# Patient Record
Sex: FEMALE | Race: WHITE | HISPANIC OR LATINO | Employment: FULL TIME | URBAN - METROPOLITAN AREA
[De-identification: names, ages, dates, MRNs, and addresses within clinical notes are randomized per-mention and may not be internally consistent; named-entity substitution may affect disease eponyms.]

---

## 2017-01-23 ENCOUNTER — ALLSCRIPTS OFFICE VISIT (OUTPATIENT)
Dept: OTHER | Facility: OTHER | Age: 37
End: 2017-01-23

## 2017-02-13 ENCOUNTER — GENERIC CONVERSION - ENCOUNTER (OUTPATIENT)
Dept: OTHER | Facility: OTHER | Age: 37
End: 2017-02-13

## 2017-02-20 ENCOUNTER — ALLSCRIPTS OFFICE VISIT (OUTPATIENT)
Dept: OTHER | Facility: OTHER | Age: 37
End: 2017-02-20

## 2017-02-20 DIAGNOSIS — H46.9 OPTIC NEURITIS: ICD-10-CM

## 2017-02-20 DIAGNOSIS — G25.81 RESTLESS LEGS SYNDROME: ICD-10-CM

## 2017-02-21 ENCOUNTER — TRANSCRIBE ORDERS (OUTPATIENT)
Dept: MRI IMAGING | Facility: CLINIC | Age: 37
End: 2017-02-21

## 2017-02-21 ENCOUNTER — APPOINTMENT (OUTPATIENT)
Dept: LAB | Facility: CLINIC | Age: 37
End: 2017-02-21
Payer: COMMERCIAL

## 2017-02-21 ENCOUNTER — TRANSCRIBE ORDERS (OUTPATIENT)
Dept: ADMINISTRATIVE | Facility: HOSPITAL | Age: 37
End: 2017-02-21

## 2017-02-21 DIAGNOSIS — H46.9 OPTIC NEURITIS: ICD-10-CM

## 2017-02-21 DIAGNOSIS — G25.81 RESTLESS LEGS SYNDROME: ICD-10-CM

## 2017-02-21 DIAGNOSIS — H46.9 OPTIC NEURITIS, UNSPECIFIED: Primary | ICD-10-CM

## 2017-02-21 LAB
BUN SERPL-MCNC: 15 MG/DL (ref 5–25)
CREAT SERPL-MCNC: 0.64 MG/DL (ref 0.6–1.3)
ERYTHROCYTE [SEDIMENTATION RATE] IN BLOOD: 7 MM/HOUR (ref 0–20)
FERRITIN SERPL-MCNC: 88 NG/ML (ref 8–388)
GFR SERPL CREATININE-BSD FRML MDRD: >60 ML/MIN/1.73SQ M

## 2017-02-21 PROCEDURE — 82565 ASSAY OF CREATININE: CPT

## 2017-02-21 PROCEDURE — 86617 LYME DISEASE ANTIBODY: CPT

## 2017-02-21 PROCEDURE — 84520 ASSAY OF UREA NITROGEN: CPT

## 2017-02-21 PROCEDURE — 36415 COLL VENOUS BLD VENIPUNCTURE: CPT

## 2017-02-21 PROCEDURE — 82728 ASSAY OF FERRITIN: CPT

## 2017-02-21 PROCEDURE — 85652 RBC SED RATE AUTOMATED: CPT

## 2017-02-22 LAB

## 2017-03-13 ENCOUNTER — HOSPITAL ENCOUNTER (OUTPATIENT)
Dept: MRI IMAGING | Facility: CLINIC | Age: 37
Discharge: HOME | End: 2017-03-13
Payer: COMMERCIAL

## 2017-03-13 DIAGNOSIS — H46.9 OPTIC NEURITIS: ICD-10-CM

## 2017-03-13 PROCEDURE — 70543 MRI ORBT/FAC/NCK W/O &W/DYE: CPT

## 2017-03-13 PROCEDURE — A9585 GADOBUTROL INJECTION: HCPCS | Performed by: PSYCHIATRY & NEUROLOGY

## 2017-03-13 PROCEDURE — 70553 MRI BRAIN STEM W/O & W/DYE: CPT

## 2017-03-13 RX ADMIN — GADOBUTROL 6 ML: 604.72 INJECTION INTRAVENOUS at 08:32

## 2017-03-29 ENCOUNTER — TRANSCRIBE ORDERS (OUTPATIENT)
Dept: ADMINISTRATIVE | Facility: HOSPITAL | Age: 37
End: 2017-03-29

## 2017-03-29 DIAGNOSIS — H46.9 OPTIC NEURITIS, UNSPECIFIED: Primary | ICD-10-CM

## 2017-07-27 ENCOUNTER — TRANSCRIBE ORDERS (OUTPATIENT)
Dept: RADIOLOGY | Facility: CLINIC | Age: 37
End: 2017-07-27

## 2017-07-27 ENCOUNTER — APPOINTMENT (OUTPATIENT)
Dept: LAB | Facility: CLINIC | Age: 37
End: 2017-07-27
Payer: COMMERCIAL

## 2017-07-27 DIAGNOSIS — Z00.8 HEALTH EXAMINATION IN POPULATION SURVEY: Primary | ICD-10-CM

## 2017-07-27 DIAGNOSIS — Z00.8 HEALTH EXAMINATION IN POPULATION SURVEY: ICD-10-CM

## 2017-07-27 LAB
CHOLEST SERPL-MCNC: 187 MG/DL (ref 50–200)
EST. AVERAGE GLUCOSE BLD GHB EST-MCNC: 111 MG/DL
HBA1C MFR BLD: 5.5 % (ref 4.2–6.3)
HDLC SERPL-MCNC: 38 MG/DL (ref 40–60)
LDLC SERPL CALC-MCNC: 95 MG/DL (ref 0–100)
TRIGL SERPL-MCNC: 271 MG/DL

## 2017-07-27 PROCEDURE — 83036 HEMOGLOBIN GLYCOSYLATED A1C: CPT

## 2017-07-27 PROCEDURE — 80061 LIPID PANEL: CPT

## 2017-07-27 PROCEDURE — 36415 COLL VENOUS BLD VENIPUNCTURE: CPT

## 2017-11-14 ENCOUNTER — ALLSCRIPTS OFFICE VISIT (OUTPATIENT)
Dept: OTHER | Facility: OTHER | Age: 37
End: 2017-11-14

## 2017-11-14 ENCOUNTER — LAB REQUISITION (OUTPATIENT)
Dept: LAB | Facility: HOSPITAL | Age: 37
End: 2017-11-14
Payer: COMMERCIAL

## 2017-11-14 ENCOUNTER — GENERIC CONVERSION - ENCOUNTER (OUTPATIENT)
Dept: OTHER | Facility: OTHER | Age: 37
End: 2017-11-14

## 2017-11-14 DIAGNOSIS — Z11.51 ENCOUNTER FOR SCREENING FOR HUMAN PAPILLOMAVIRUS (HPV): ICD-10-CM

## 2017-11-14 LAB — S PYO AG THROAT QL: NEGATIVE

## 2017-11-14 PROCEDURE — 87624 HPV HI-RISK TYP POOLED RSLT: CPT | Performed by: PHYSICIAN ASSISTANT

## 2017-11-14 PROCEDURE — G0145 SCR C/V CYTO,THINLAYER,RESCR: HCPCS | Performed by: PHYSICIAN ASSISTANT

## 2017-11-15 NOTE — PROGRESS NOTES
Assessment    1  Pharyngitis (462) (J02 9)  2  Viral infection (079 99) (B34 9)    Discussion/Summary    Viral illness-Rest  Drink plenty of fluids  Tylenol or Motrin or Advil for fever  Avoid dairy for a few days  testing is negative  The patient was counseled regarding  Possible side effects of new medications were reviewed with the patient/guardian today  The treatment plan was reviewed with the patient/guardian  The patient/guardian understands and agrees with the treatment plan     Self Referrals: No      Chief Complaint  Patient is here today with complaints of body aches, fever, and abdominal pains      History of Present Illness  Pt developed sinus congestion last night  She has had sore throat for the past few days  She woke up today with nausea, chills and body achiness  She took tylenol  She currently has a fever  She has a pain across the top of her abdomen  her daughter recent had a stomach virus      Review of Systems   Constitutional: fever,-- feeling poorly-- and-- chills, but-- as noted in HPI  Eyes: No complaints of eye pain, no red eyes, no eyesight problems, no discharge, no dry eyes, no itching of eyes  ENT: sore throat-- and-- sinus drainage and recent sore throat, but-- as noted in HPI  Cardiovascular: No complaints of slow heart rate, no fast heart rate, no chest pain, no palpitations, no leg claudication, no lower extremity edema  Respiratory: No complaints of shortness of breath, no wheezing, no cough, no SOB on exertion, no orthopnea, no PND  Gastrointestinal: nausea-- and-- vomiting, but-- as noted in HPI  Musculoskeletal: myalgias, but-- as noted in HPI  Active Problems  1  Anxiety disorder (300 00) (F41 9)  2  Benign essential hypertension (401 1) (I10)  3  Depressive disorder (311) (F32 9)  4  Encounter for gynecological examination without abnormal finding (V72 31) (Z01 419)  5  Hypertriglyceridemia (272 1) (E78 1)  6  Insomnia (780 52) (G47 00)  7   Ocular migraine (346 80) (G43 109)  8  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)  9  Optic neuritis (377 30) (H46 9)  10  Panic attack (300 01) (F41 0)  11  Positive depression screening (796 4) (Z13 89)  12  Restless leg syndrome (333 94) (G25 81)  13  Retinal hole (362 54) (H33 329)  14  Screening for human papillomavirus (HPV) (V73 81) (Z11 51)  15  Seasonal allergies (477 9) (J30 2)  16  Sinusitis (473 9) (J32 9)  17  Tension headache (307 81) (G44 209)  18  Vitamin D deficiency (268 9) (E55 9)    Past Medical History  1  Acute upper respiratory infection (465 9) (J06 9)    The active problems and past medical history were reviewed and updated today  Surgical History  1  History of Closed Laryngeal Fracture Repair With Manipulative Reduction  2  History of Eye Surgery  3  Denied: History of Recent Surgery    The surgical history was reviewed and updated today  Family History  Mother   1  Family history of hypertension (V17 49) (Z82 49)  2  Family history of thyroid disease (V18 19) (Z83 49)  Father   3  Family history of diabetes mellitus (V18 0) (Z83 3)  4  Family history of hypertension (V17 49) (Z82 49)  Maternal Grandmother   5  Family history of diabetes mellitus (V18 0) (Z83 3)  Family History   6  Family history of diabetes mellitus (V18 0) (Z83 3)  7  Family history of hypertension (V17 49) (Z82 49)    The family history was reviewed and updated today  Social History     · Employed   · No illicit drug use   · Sexually active   · Single   · Social alcohol use (Z78 9)  The social history was reviewed and updated today  The social history was reviewed and is unchanged  Current Meds  1  ALPRAZolam 0 5 MG Oral Tablet; TAKE 1 TABLET DAILY AS NEEDED; Therapy: [id number] to (Evaluate:02Nov2017); Last Rx:[id number] Ordered  2  [facility] Vitamin E CAPS; take 1 capsule daily; Therapy: (Recorded:01Jun2017) to Recorded  3   HydroCHLOROthiazide 25 MG Oral Tablet; TAKE 1 TABLET EVERY MORNING  Requested for: 20XNZ9480; Last Rx: [id number] Ordered  4  Loratadine 10 MG Oral Tablet; TAKE 1 TABLET DAILY; Therapy: (Recorded:01Jun2017) to Recorded  5  Tri-Linyah 0 18/0 215/0 25 MG-35 MCG Oral Tablet; Take 1 tablet daily as directed; Therapy: [id number] to (Evaluate:16Oct2018)  Requested for: 26WNA0371; Last Rx:14Nov2017 Ordered  6  Vitamin D 1000 UNIT CAPS; TAKE 2 CAPSULE Daily; Therapy: (Recorded:01Jun2017) to Recorded  7  Zolpidem Tartrate ER 6 25 MG Oral Tablet Extended Release; TAKE 1 TABLET AT BEDTIME AS NEEDED FOR SLEEP; Therapy: [id number] to (Evaluate:20Apr2017); Last Rx:21Mar2017 Ordered    The medication list was reviewed and updated today  Allergies  1  Amoxicillin CAPS    Vitals  Vital Signs    Recorded: 57QRT8186 02:02PM   Temperature 100 8 F   Heart Rate 98   Systolic 069   Diastolic 84   Height 5 ft    Weight 149 lb 8 0 oz   BMI Calculated 29 2   BSA Calculated 1 64   O2 Saturation 96       Physical Exam   Constitutional  General appearance: Abnormal   uncomfortable-- and-- appears tired  Eyes  Conjunctiva and lids: No swelling, erythema or discharge  Pupils and irises: Equal, round and reactive to light  Ears, Nose, Mouth, and Throat  External inspection of ears and nose: Normal    Otoscopic examination: Tympanic membranes translucent with normal light reflex  Canals patent without erythema  Nasal mucosa, septum, and turbinates: Normal without edema or erythema  Oropharynx: Abnormal  -- left tonsils, + mild exudate  Pulmonary  Respiratory effort: No increased work of breathing or signs of respiratory distress  Auscultation of lungs: Clear to auscultation  Cardiovascular  Auscultation of heart: Normal rate and rhythm, normal S1 and S2, without murmurs  Abdomen  Abdomen: Non-tender, no masses  Lymphatic  Palpation of lymph nodes in neck: No lymphadenopathy  Musculoskeletal  Gait and station: Normal    Skin  Skin and subcutaneous tissue: Normal without rashes or lesions     Psychiatric  Orientation to person, place, and time: Normal    Mood and affect: Normal          Results/Data  PHQ-9 Adult Depression Screening [id number] 02:18PM User, Joes     Test Name Result Flag Reference   PHQ-9 Adult Depression Score 4       Over the last two weeks, how often have you been bothered by any of the following problems? Little interest or pleasure in doing things: Not at all - 0 Feeling down, depressed, or hopeless: Not at all - 0 Trouble falling or staying asleep, or sleeping too much: Several days - 1 Feeling tired or having little energy: Several days - 1 Poor appetite or over eating: Not at all - 0 Feeling bad about yourself - or that you are a failure or have let yourself or your family down: Several days - 1 Trouble concentrating on things, such as reading the newspaper or watching television: Several days - 1 Moving or speaking so slowly that other people could have noticed  Or the opposite -  being so fidgety or restless that you have been moving around a lot more than usual: Not at all - 0 Thoughts that you would be better off dead, or of hurting yourself in some way: Not at all - 0   PHQ-9 Adult Depression Screening Negative     PHQ-9 Difficulty Level Not difficult at all     PHQ-9 Severity Minimal Depression           Message  Kari Ly is under my professional care  She was seen in my office on 11-14-17   She is able to return to work on  11-              1 Amended By: Brooke Mccurdy;  Nov 15 2017 1:48 PM EST    Signatures   Electronically signed by : Arturo Albarado NP; Nov 14 2017  2:28PM EST                       (Author)    Electronically signed by : Guy Rashid MD; Nov 14 2017  2:32PM EST                       (Co-author)    Electronically signed by : Guy Rashid MD; Nov 15 2017  4:12PM EST                       (Co-author)

## 2017-11-16 LAB — HPV RRNA GENITAL QL NAA+PROBE: NORMAL

## 2017-11-20 LAB
LAB AP GYN PRIMARY INTERPRETATION: NORMAL
LAB AP LMP: NORMAL
Lab: NORMAL

## 2018-01-10 NOTE — MISCELLANEOUS
Message  Return to work or school:   Mirian Flor is under my professional care   She was seen in my office on 11-14-17   She is able to return to work on  11-17-17            Signatures  Electronically signed by : Justin Meyers NP; Nov 14 2017  2:19PM EST                       (Author)  Electronically signed by : Justin Meyers NP; Nov 14 2017  2:28PM EST                       (Author)  Electronically signed by : Erik Gruber MD; Nov 14 2017  2:32PM EST

## 2018-01-10 NOTE — RESULT NOTES
Verified Results  (1) COMPREHENSIVE METABOLIC PANEL [id number] 69:25JM [facility] Kidney Disease Education Program recommendations are as follows:  GFR calculation is accurate only with a steady state creatinine  Chronic Kidney disease less than 60 ml/min/1 73 sq  meters  Kidney failure less than 15 ml/min/1 73 sq  meters  Test Name Result Flag Reference   GLUCOSE,RANDM 84 mg/dL     If the patient is fasting, the ADA then defines impaired fasting glucose as > 100 mg/dL and diabetes as > or equal to 123 mg/dL  SODIUM 138 mmol/L  136-145   POTASSIUM 3 7 mmol/L  3 5-5 3   CHLORIDE 103 mmol/L  100-108   CARBON DIOXIDE 26 mmol/L  21-32   ANION GAP (CALC) 9 mmol/L  4-13   BLOOD UREA NITROGEN 17 mg/dL  5-25   CREATININE 0 62 mg/dL  0 60-1 30   Standardized to IDMS reference method   CALCIUM 9 0 mg/dL  8 3-10 1   BILI, TOTAL 0 30 mg/dL  0 20-1 00   ALK PHOSPHATAS 56 U/L     ALT (SGPT) 17 U/L  12-78   AST(SGOT) 14 U/L  5-45   ALBUMIN 3 7 g/dL  3 5-5 0   TOTAL PROTEIN 8 2 g/dL  6 4-8 2   eGFR Non-African American      >60 0 ml/min/1 73sq m     (1) LIPID PANEL FASTING W DIRECT LDL REFLEX [id number] 09:25AM Wendi Mercedes   Triglyceride:         Normal              <150 mg/dl       Borderline High    150-199 mg/dl       High               200-499 mg/dl       Very High          >499 mg/dl  Cholesterol:         Desirable        <200 mg/dl      Borderline High  200-239 mg/dl      High             >239 mg/dl  HDL Cholesterol:        High    >59 mg/dL      Low     <41 mg/dL  LDL Cholesterol:        Optimal          <100 mg/dl         Near Optimal     100-129 mg/dl        Above Optimal          Borderline High   130-159 mg/dl          High              160-189 mg/dl          Very High        >189 mg/dl  LDL CALCULATED:    This screening LDL is a calculated result  It does not have the accuracy of the Direct Measured LDL in the monitoring of patients with hyperlipidemia and/or statin therapy     Direct Measure LDL ([id number]) must be ordered separately in these patients  Test Name Result Flag Reference   CHOLESTEROL 160 mg/dL     LDL CHOLESTEROL CALCULATED 73 mg/dL  0-100   TRIGLYCERIDES 243 mg/dL H <=150   HDL,DIRECT 38 mg/dL L 40-60     (1) TSH WITH FT4 REFLEX [id number] 09:25AM [facility]   Patients undergoing fluorescein dye angiography may retain small amounts of fluorescein in the body for 48-72 hours post procedure  Samples containing fluorescein can produce falsely depressed TSH values  If the patient had this procedure,a specimen should be resubmitted post fluorescein clearance  The recommended reference ranges for TSH during pregnancy are as follows:  First trimester 0 1 to 2 5 uIU/mL  Second trimester  0 2 to 3 0 uIU/mL  Third trimester 0 3 to 3 0 uIU/m     Test Name Result Flag Reference   TSH 1 800 uIU/mL  0 358-3 740     (1) VITAMIN D 25-HYDROXY [id number] 09:25AM [place], [address]     Test Name Result Flag Reference   VIT D 25-HYDROX 17 6 ng/mL L 30 0-100 0     (1) CBC/PLT/DIFF [id number] 09:25AM [facility]     Test Name Result Flag Reference   WBC COUNT 10 89 Thousand/uL H 4 31-10 16   RBC COUNT 4 74 Million/uL  3 81-5 12   HEMOGLOBIN 13 8 g/dL  11 5-15 4   HEMATOCRIT 40 3 %  34 8-46  1   MCV 85 fL  82-98   MCH 29 1 pg  26 8-34 3   MCHC 34 2 g/dL  31 4-37 4   RDW 12 2 %  11 6-15 1   MPV 11 8 fL  8 9-12 7   PLATELET COUNT 604 Thousands/uL  149-390   nRBC AUTOMATED 0 /100 WBCs     NEUTROPHILS RELATIVE PERCENT 71 %  43-75   LYMPHOCYTES RELATIVE PERCENT 22 %  14-44   MONOCYTES RELATIVE PERCENT 5 %  4-12   EOSINOPHILS RELATIVE PERCENT 2 %  0-6   BASOPHILS RELATIVE PERCENT 0 %  0-1   NEUTROPHILS ABSOLUTE COUNT 7 74 Thousands/µL H 1 85-7 62   LYMPHOCYTES ABSOLUTE COUNT 2 37 Thousands/µL  0 60-4 47   MONOCYTES ABSOLUTE COUNT 0 52 Thousand/µL  0 17-1 22   EOSINOPHILS ABSOLUTE COUNT 0 20 Thousand/µL  0 00-0 61   BASOPHILS ABSOLUTE COUNT 0 02 Thousands/µL  0 00-0 10

## 2018-01-12 NOTE — MISCELLANEOUS
Message   Recorded as Task   Date: 03/08/2016 04:01 PM, Created By: Reshma Feliciano   Task Name: Follow Up   Assigned To: Wendi Mercedes   Regarding Patient: Joseph Lofton, Status: Active   [facility] - 08 Mar 2016 4:01 PM     TASK CREATED  Caller: Self; General Medical Question; [phone] (Home)  HCTZ is working well for the BP, however with the warmer temps she is noticing a lot more sweating  Also with any alcohol consumption she gets real flushed  and cheeks are pink pretty much always if called befor 4:30 call [phone] afterwards call [phone]   Spoke to patient she is flushing and feeling " hot flashes"  Discussed not typical side effect from HCTZ  Will lower her dose to 12 5 mg  If symptoms fail to improve consider another agent   monitor BP closely and call in 2 wks      Signatures   Electronically signed by : Jazzy Hassan MD; Mar  8 2016  4:46PM EST                       (Author)

## 2018-01-13 VITALS
WEIGHT: 134 LBS | HEART RATE: 72 BPM | RESPIRATION RATE: 16 BRPM | SYSTOLIC BLOOD PRESSURE: 130 MMHG | BODY MASS INDEX: 26.31 KG/M2 | DIASTOLIC BLOOD PRESSURE: 88 MMHG | HEIGHT: 60 IN

## 2018-01-14 VITALS
OXYGEN SATURATION: 96 % | DIASTOLIC BLOOD PRESSURE: 84 MMHG | HEART RATE: 98 BPM | SYSTOLIC BLOOD PRESSURE: 128 MMHG | HEIGHT: 60 IN | BODY MASS INDEX: 29.35 KG/M2 | TEMPERATURE: 100.8 F | WEIGHT: 149.5 LBS

## 2018-01-14 VITALS
SYSTOLIC BLOOD PRESSURE: 124 MMHG | OXYGEN SATURATION: 98 % | BODY MASS INDEX: 26.55 KG/M2 | DIASTOLIC BLOOD PRESSURE: 80 MMHG | TEMPERATURE: 98.5 F | WEIGHT: 135.25 LBS | HEART RATE: 88 BPM | HEIGHT: 60 IN

## 2018-01-18 NOTE — MISCELLANEOUS
Message  dizzy since last night, feels off balance  slightly nauseated  Trazodone was recently stopped  She has not started temazepam yet b/c it was too expensive  Discussed w/ her it sounds like vertigo  would try OTC meclizine  If symptoms persist would speak w/ Neuro  ER precautions  She would like to try Ambien for sleep  Will RX  Discussed not intended for nightly use, potential for dependence        Signatures   Electronically signed by : Martina Mackenzie MD; Feb 12 2016  8:07AM EST                       (Author)

## 2018-01-22 VITALS
BODY MASS INDEX: 29.06 KG/M2 | HEIGHT: 60 IN | DIASTOLIC BLOOD PRESSURE: 70 MMHG | WEIGHT: 148 LBS | SYSTOLIC BLOOD PRESSURE: 122 MMHG

## 2018-02-28 DIAGNOSIS — F41.9 ANXIETY: Primary | ICD-10-CM

## 2018-02-28 DIAGNOSIS — I10 HYPERTENSION, UNSPECIFIED TYPE: ICD-10-CM

## 2018-02-28 RX ORDER — HYDROCHLOROTHIAZIDE 25 MG/1
1 TABLET ORAL
COMMUNITY
End: 2018-02-28 | Stop reason: SDUPTHER

## 2018-02-28 RX ORDER — HYDROCHLOROTHIAZIDE 25 MG/1
25 TABLET ORAL DAILY
Qty: 90 TABLET | Refills: 2 | Status: SHIPPED | OUTPATIENT
Start: 2018-02-28 | End: 2018-06-04 | Stop reason: SDUPTHER

## 2018-02-28 RX ORDER — ALPRAZOLAM 0.5 MG/1
1 TABLET ORAL DAILY PRN
COMMUNITY
Start: 2016-05-12 | End: 2018-02-28 | Stop reason: SDUPTHER

## 2018-02-28 RX ORDER — ALPRAZOLAM 0.5 MG/1
0.5 TABLET ORAL DAILY PRN
Qty: 90 TABLET | Refills: 0 | Status: SHIPPED | OUTPATIENT
Start: 2018-02-28 | End: 2018-06-04 | Stop reason: SDUPTHER

## 2018-06-04 DIAGNOSIS — F41.9 ANXIETY: ICD-10-CM

## 2018-06-04 DIAGNOSIS — I10 HYPERTENSION, UNSPECIFIED TYPE: ICD-10-CM

## 2018-06-04 RX ORDER — HYDROCHLOROTHIAZIDE 25 MG/1
25 TABLET ORAL DAILY
Qty: 90 TABLET | Refills: 0 | Status: SHIPPED | OUTPATIENT
Start: 2018-06-04 | End: 2018-09-26 | Stop reason: SDUPTHER

## 2018-06-04 RX ORDER — ALPRAZOLAM 0.5 MG/1
0.5 TABLET ORAL DAILY PRN
Qty: 90 TABLET | Refills: 0 | Status: SHIPPED | OUTPATIENT
Start: 2018-06-04 | End: 2018-06-04 | Stop reason: SDUPTHER

## 2018-06-04 RX ORDER — ALPRAZOLAM 0.5 MG/1
0.5 TABLET ORAL DAILY PRN
Qty: 30 TABLET | Refills: 0 | Status: SHIPPED | OUTPATIENT
Start: 2018-06-04 | End: 2019-03-20 | Stop reason: SDUPTHER

## 2018-06-25 ENCOUNTER — OFFICE VISIT (OUTPATIENT)
Dept: PODIATRY | Facility: CLINIC | Age: 38
End: 2018-06-25
Payer: COMMERCIAL

## 2018-06-25 VITALS
SYSTOLIC BLOOD PRESSURE: 127 MMHG | HEART RATE: 80 BPM | HEIGHT: 60 IN | BODY MASS INDEX: 29.45 KG/M2 | WEIGHT: 150 LBS | DIASTOLIC BLOOD PRESSURE: 86 MMHG | RESPIRATION RATE: 16 BRPM

## 2018-06-25 DIAGNOSIS — Q66.52 CONGENITAL PES PLANUS OF LEFT FOOT: ICD-10-CM

## 2018-06-25 DIAGNOSIS — M72.2 PLANTAR FASCIITIS: Primary | ICD-10-CM

## 2018-06-25 DIAGNOSIS — M79.671 PAIN IN BOTH FEET: ICD-10-CM

## 2018-06-25 DIAGNOSIS — M79.672 PAIN IN BOTH FEET: ICD-10-CM

## 2018-06-25 DIAGNOSIS — Q66.51 CONGENITAL PES PLANUS OF RIGHT FOOT: ICD-10-CM

## 2018-06-25 PROCEDURE — 73620 X-RAY EXAM OF FOOT: CPT | Performed by: PODIATRIST

## 2018-06-25 PROCEDURE — L3000 FT INSERT UCB BERKELEY SHELL: HCPCS | Performed by: PODIATRIST

## 2018-06-25 PROCEDURE — 99203 OFFICE O/P NEW LOW 30 MIN: CPT | Performed by: PODIATRIST

## 2018-06-25 RX ORDER — MULTIVIT WITH MINERALS/LUTEIN
1 TABLET ORAL DAILY
COMMUNITY
End: 2020-02-24

## 2018-06-25 RX ORDER — NORGESTIMATE AND ETHINYL ESTRADIOL 7DAYSX3 28
1 KIT ORAL DAILY
COMMUNITY
Start: 2016-11-01 | End: 2018-11-19 | Stop reason: SDUPTHER

## 2018-06-25 RX ORDER — LORATADINE 10 MG/1
1 TABLET ORAL DAILY
COMMUNITY

## 2018-06-25 RX ORDER — BIOTIN 1 MG
1 TABLET ORAL DAILY
COMMUNITY

## 2018-06-25 RX ORDER — ROPINIROLE 1 MG/1
TABLET, FILM COATED ORAL
COMMUNITY
Start: 2015-10-27 | End: 2018-12-10 | Stop reason: SDUPTHER

## 2018-06-25 NOTE — PROGRESS NOTES
Assessment/Plan:  Plantar fasciitis  Pain  Pes planus  Plan  X-rays taken  Patient will consider therapy  Blood work is being ordered to rule out Lyme disease or arthropathy  No problem-specific Assessment & Plan notes found for this encounter  There are no diagnoses linked to this encounter  Subjective:  Patient complains of pain in her feet  She has pain in the left arch  This has been ongoing for months  No history of trauma  No past medical history on file  No past surgical history on file  Allergies   Allergen Reactions    Amoxicillin Edema     Category: Allergy;          Current Outpatient Prescriptions:     norgestimate-ethinyl estradiol (TRI-LINYAH) 0 18/0 215/0 25 MG-35 MCG per tablet, Take 1 tablet by mouth daily, Disp: , Rfl:     rOPINIRole (REQUIP) 1 mg tablet, Take by mouth, Disp: , Rfl:     ALPRAZolam (XANAX) 0 5 mg tablet, Take 1 tablet (0 5 mg total) by mouth daily as needed for anxiety for up to 30 days, Disp: 30 tablet, Rfl: 0    Cholecalciferol (VITAMIN D3) 1000 units CAPS, Take 2 capsules by mouth daily, Disp: , Rfl:     hydrochlorothiazide (HYDRODIURIL) 25 mg tablet, Take 1 tablet (25 mg total) by mouth daily, Disp: 90 tablet, Rfl: 0    loratadine (CLARITIN) 10 mg tablet, Take 1 tablet by mouth daily, Disp: , Rfl:     vitamin E, tocopherol, ([facility] VITAMIN E) 1,000 units capsule, Take 1 capsule by mouth daily, Disp: , Rfl:     There is no problem list on file for this patient  Patient ID: Cleveland March is a 30 y o  female  HPI    The following portions of the patient's history were reviewed and updated as appropriate: allergies, current medications, past family history, past medical history, past social history, past surgical history and problem list     Review of Systems      Objective:      Foot Exam    General  General Appearance: appears stated age and healthy   Orientation: alert and oriented to person, place, and time   Affect: appropriate   Gait: unimpaired       Right Foot/Ankle     Inspection and Palpation  Ecchymosis: none  Tenderness: plantar fascia   Swelling: plantar fascia   Arch: pes planus  Hammertoes: fifth toe  Hallux valgus: no  Hallux limitus: yes    Neurovascular  Dorsalis pedis: 3+  Posterior tibial: 3+  Saphenous nerve sensation: normal  Tibial nerve sensation: normal  Superficial peroneal nerve sensation: normal  Deep peroneal nerve sensation: normal  Sural nerve sensation: normal  Achilles reflex: 2+  Babinski reflex: 2+    Muscle Strength  Ankle dorsiflexion: 4+    Range of Motion    Passive  Ankle dorsiflexion: 5      Tests  PT Tinel's sign: negative    Too many toes: positive       Left Foot/Ankle      Inspection and Palpation  Ecchymosis: none  Tenderness: plantar fascia   Swelling: plantar fascia   Arch: pes planus  Hammertoes: fifth toe  Hallux valgus: no  Hallux limitus: yes  Skin Exam: skin intact; Neurovascular  Dorsalis pedis: 3+  Posterior tibial: 3+  Saphenous nerve sensation: normal  Tibial nerve sensation: normal  Superficial peroneal nerve sensation: normal  Deep peroneal nerve sensation: normal  Sural nerve sensation: normal  Achilles reflex: 2+  Babinski reflex: 2+    Muscle Strength  Ankle dorsiflexion: 4+    Range of Motion    Passive  Ankle dorsiflexion: 5      Tests  PT Tinel's sign: negative  Too many toes: positive         Physical Exam   Constitutional: She appears well-developed and well-nourished  Cardiovascular: Normal rate and regular rhythm  Pulses:       Dorsalis pedis pulses are 3+ on the right side, and 3+ on the left side  Posterior tibial pulses are 3+ on the right side, and 3+ on the left side  Musculoskeletal:   Patient is maximally pronated in stance and gait  Pain with palpation midbody plantar fascia  No evidence of rupture or masses  X-ray  No occult bone pathology noted  No evidence of fracture or bony mass     Neurological:   Reflex Scores:       Achilles reflexes are 2+ on the right side and 2+ on the left side

## 2018-07-19 ENCOUNTER — OFFICE VISIT (OUTPATIENT)
Dept: PODIATRY | Facility: CLINIC | Age: 38
End: 2018-07-19
Payer: COMMERCIAL

## 2018-07-19 VITALS
DIASTOLIC BLOOD PRESSURE: 89 MMHG | HEIGHT: 60 IN | WEIGHT: 150 LBS | HEART RATE: 79 BPM | RESPIRATION RATE: 16 BRPM | SYSTOLIC BLOOD PRESSURE: 141 MMHG | BODY MASS INDEX: 29.45 KG/M2

## 2018-07-19 DIAGNOSIS — M72.2 PLANTAR FASCIITIS: Primary | ICD-10-CM

## 2018-07-19 DIAGNOSIS — Q66.50 CONGENITAL PES PLANUS, UNSPECIFIED LATERALITY: ICD-10-CM

## 2018-07-19 DIAGNOSIS — M79.672 PAIN IN BOTH FEET: ICD-10-CM

## 2018-07-19 DIAGNOSIS — M79.671 PAIN IN BOTH FEET: ICD-10-CM

## 2018-07-19 DIAGNOSIS — M21.961 ACQUIRED DEFORMITY OF RIGHT FOOT: ICD-10-CM

## 2018-07-19 PROCEDURE — 20550 NJX 1 TENDON SHEATH/LIGAMENT: CPT | Performed by: PODIATRIST

## 2018-07-19 PROCEDURE — 99212 OFFICE O/P EST SF 10 MIN: CPT | Performed by: PODIATRIST

## 2018-07-19 NOTE — PROGRESS NOTES
Assessment/Plan:  Pain  Plantar fasciitis right foot  Rule out Lyme disease  Pes planus  Plan  Right foot trigger point injection done  1 25 cc of Kenalog 10 was injected without pain or complication  This was done into the right foot  Patient will use orthotics  Patient will obtain blood work as directed  There are no diagnoses linked to this encounter  Subjective:  Patient continues to have right foot pain  She suffers from post static dyskinesia  She has pain in the right heel  No history of trauma   Patient ID: Aaron Weiss is a 30 y o  female  No past medical history on file  No past surgical history on file  Allergies   Allergen Reactions    Amoxicillin Edema     Category:  Allergy;          Current Outpatient Prescriptions:     ALPRAZolam (XANAX) 0 5 mg tablet, Take 1 tablet (0 5 mg total) by mouth daily as needed for anxiety for up to 30 days, Disp: 30 tablet, Rfl: 0    Cholecalciferol (VITAMIN D3) 1000 units CAPS, Take 2 capsules by mouth daily, Disp: , Rfl:     hydrochlorothiazide (HYDRODIURIL) 25 mg tablet, Take 1 tablet (25 mg total) by mouth daily, Disp: 90 tablet, Rfl: 0    loratadine (CLARITIN) 10 mg tablet, Take 1 tablet by mouth daily, Disp: , Rfl:     norgestimate-ethinyl estradiol (TRI-LINYAH) 0 18/0 215/0 25 MG-35 MCG per tablet, Take 1 tablet by mouth daily, Disp: , Rfl:     rOPINIRole (REQUIP) 1 mg tablet, Take by mouth, Disp: , Rfl:     vitamin E, tocopherol, ([employer]S VITAMIN E) 1,000 units capsule, Take 1 capsule by mouth daily, Disp: , Rfl:     Patient Active Problem List   Diagnosis    Plantar fasciitis    Pain in both feet    Congenital pes planus of right foot    Congenital pes planus of left foot       HPI    The following portions of the patient's history were reviewed and updated as appropriate: allergies, current medications, past family history, past medical history, past social history, past surgical history and problem list     Review of Systems      Historical Information   No past medical history on file  No past surgical history on file  Social History   History   Alcohol use Not on file     History   Drug use: Unknown     Family History: No family history on file  Meds/Allergies   Allergies   Allergen Reactions    Amoxicillin Edema     Category: Allergy;        Current Outpatient Prescriptions on File Prior to Visit   Medication Sig Dispense Refill    ALPRAZolam (XANAX) 0 5 mg tablet Take 1 tablet (0 5 mg total) by mouth daily as needed for anxiety for up to 30 days 30 tablet 0    Cholecalciferol (VITAMIN D3) 1000 units CAPS Take 2 capsules by mouth daily      hydrochlorothiazide (HYDRODIURIL) 25 mg tablet Take 1 tablet (25 mg total) by mouth daily 90 tablet 0    loratadine (CLARITIN) 10 mg tablet Take 1 tablet by mouth daily      norgestimate-ethinyl estradiol (TRI-LINYAH) 0 18/0 215/0 25 MG-35 MCG per tablet Take 1 tablet by mouth daily      rOPINIRole (REQUIP) 1 mg tablet Take by mouth      vitamin E, tocopherol, ([facility] VITAMIN E) 1,000 units capsule Take 1 capsule by mouth daily       No current facility-administered medications on file prior to visit  Objective:  Patient's shoes and socks removed     Foot ExamPhysical Exam       Foot Exam     General  General Appearance: appears stated age and healthy   Orientation: alert and oriented to person, place, and time   Affect: appropriate   Gait: unimpaired         Right Foot/Ankle      Inspection and Palpation  Ecchymosis: none  Tenderness: plantar fascia   Swelling: plantar fascia   Arch: pes planus  Hammertoes: fifth toe  Hallux valgus: no  Hallux limitus: yes     Neurovascular  Dorsalis pedis: 3+  Posterior tibial: 3+  Saphenous nerve sensation: normal  Tibial nerve sensation: normal  Superficial peroneal nerve sensation: normal  Deep peroneal nerve sensation: normal  Sural nerve sensation: normal  Achilles reflex: 2+  Babinski reflex: 2+     Muscle Strength  Ankle dorsiflexion: 4+     Range of Motion     Passive  Ankle dorsiflexion: 5        Tests  PT Tinel's sign: negative    Too many toes: positive         Left Foot/Ankle       Inspection and Palpation  Ecchymosis: none  Tenderness: plantar fascia   Swelling: plantar fascia   Arch: pes planus  Hammertoes: fifth toe  Hallux valgus: no  Hallux limitus: yes  Skin Exam: skin intact;      Neurovascular  Dorsalis pedis: 3+  Posterior tibial: 3+  Saphenous nerve sensation: normal  Tibial nerve sensation: normal  Superficial peroneal nerve sensation: normal  Deep peroneal nerve sensation: normal  Sural nerve sensation: normal  Achilles reflex: 2+  Babinski reflex: 2+     Muscle Strength  Ankle dorsiflexion: 4+     Range of Motion     Passive  Ankle dorsiflexion: 5        Tests  PT Tinel's sign: negative  Too many toes: positive         Physical Exam   Constitutional: She appears well-developed and well-nourished  Cardiovascular: Normal rate and regular rhythm  Pulses:       Dorsalis pedis pulses are 3+ on the right side, and 3+ on the left side  Posterior tibial pulses are 3+ on the right side, and 3+ on the left side  Musculoskeletal:   Patient is maximally pronated in stance and gait  Pain with palpation midbody plantar fascia  No evidence of rupture or masses  X-ray  No occult bone pathology noted  No evidence of fracture or bony mass  Neurological:   Reflex Scores:       Achilles reflexes are 2+ on the right side and 2+ on the left side

## 2018-07-20 ENCOUNTER — TRANSCRIBE ORDERS (OUTPATIENT)
Dept: ADMINISTRATIVE | Facility: HOSPITAL | Age: 38
End: 2018-07-20

## 2018-07-20 ENCOUNTER — APPOINTMENT (OUTPATIENT)
Dept: LAB | Facility: HOSPITAL | Age: 38
End: 2018-07-20
Payer: COMMERCIAL

## 2018-07-20 ENCOUNTER — APPOINTMENT (OUTPATIENT)
Dept: LAB | Facility: HOSPITAL | Age: 38
End: 2018-07-20
Attending: PODIATRIST
Payer: COMMERCIAL

## 2018-07-20 DIAGNOSIS — Z00.8 HEALTH EXAMINATION IN POPULATION SURVEY: Primary | ICD-10-CM

## 2018-07-20 DIAGNOSIS — Z00.8 HEALTH EXAMINATION IN POPULATION SURVEY: ICD-10-CM

## 2018-07-20 DIAGNOSIS — A69.20 LYME DISEASE: ICD-10-CM

## 2018-07-20 DIAGNOSIS — A69.20 LYME DISEASE: Primary | ICD-10-CM

## 2018-07-20 LAB
CHOLEST SERPL-MCNC: 207 MG/DL (ref 50–200)
ERYTHROCYTE [SEDIMENTATION RATE] IN BLOOD: 12 MM/HOUR (ref 2–25)
EST. AVERAGE GLUCOSE BLD GHB EST-MCNC: 100 MG/DL
HBA1C MFR BLD: 5.1 % (ref 4.2–6.3)
HDLC SERPL-MCNC: 34 MG/DL (ref 40–60)
LDLC SERPL CALC-MCNC: 119 MG/DL (ref 0–100)
NONHDLC SERPL-MCNC: 173 MG/DL
TRIGL SERPL-MCNC: 268 MG/DL

## 2018-07-20 PROCEDURE — 36415 COLL VENOUS BLD VENIPUNCTURE: CPT

## 2018-07-20 PROCEDURE — 86038 ANTINUCLEAR ANTIBODIES: CPT

## 2018-07-20 PROCEDURE — 86618 LYME DISEASE ANTIBODY: CPT

## 2018-07-20 PROCEDURE — 83036 HEMOGLOBIN GLYCOSYLATED A1C: CPT

## 2018-07-20 PROCEDURE — 80061 LIPID PANEL: CPT

## 2018-07-20 PROCEDURE — 86430 RHEUMATOID FACTOR TEST QUAL: CPT

## 2018-07-20 PROCEDURE — 85652 RBC SED RATE AUTOMATED: CPT

## 2018-07-22 LAB
B BURGDOR IGG SER IA-ACNC: 0.1
B BURGDOR IGM SER IA-ACNC: 0.24

## 2018-07-23 LAB
RHEUMATOID FACT SER QL LA: NEGATIVE
RYE IGE QN: NEGATIVE

## 2018-07-25 ENCOUNTER — TELEPHONE (OUTPATIENT)
Dept: FAMILY MEDICINE CLINIC | Facility: CLINIC | Age: 38
End: 2018-07-25

## 2018-08-04 ENCOUNTER — OFFICE VISIT (OUTPATIENT)
Dept: URGENT CARE | Facility: MEDICAL CENTER | Age: 38
End: 2018-08-04
Payer: COMMERCIAL

## 2018-08-04 VITALS
RESPIRATION RATE: 12 BRPM | TEMPERATURE: 97.6 F | BODY MASS INDEX: 30.78 KG/M2 | DIASTOLIC BLOOD PRESSURE: 90 MMHG | HEART RATE: 92 BPM | HEIGHT: 60 IN | SYSTOLIC BLOOD PRESSURE: 156 MMHG | WEIGHT: 156.8 LBS | OXYGEN SATURATION: 100 %

## 2018-08-04 DIAGNOSIS — R39.9 UTI SYMPTOMS: Primary | ICD-10-CM

## 2018-08-04 PROCEDURE — 99203 OFFICE O/P NEW LOW 30 MIN: CPT | Performed by: PHYSICIAN ASSISTANT

## 2018-08-04 PROCEDURE — 87086 URINE CULTURE/COLONY COUNT: CPT

## 2018-08-04 PROCEDURE — S9088 SERVICES PROVIDED IN URGENT: HCPCS | Performed by: PHYSICIAN ASSISTANT

## 2018-08-04 RX ORDER — NITROFURANTOIN 25; 75 MG/1; MG/1
100 CAPSULE ORAL 2 TIMES DAILY
Qty: 14 CAPSULE | Refills: 0 | Status: SHIPPED | OUTPATIENT
Start: 2018-08-04 | End: 2018-08-11

## 2018-08-04 NOTE — PROGRESS NOTES
[facility]        NAME: Sukumar Gvoea is a 30 y o  female  : 1980    MRN: [id number]      Assessment and Plan   UTI symptoms [R39 9]  1  UTI symptoms  nitrofurantoin (MACROBID) 100 mg capsule         Patient Instructions     Patient Instructions   Take antibiotic as directed  Eat yogurt to avoid GI upset  Increase fluid intake  Avoid holding bladder for prolonged periods  Urinate after intercourse  Always wipe front to back  Go to the ER for worsening symptoms: flank pain, fever/chills, nausea/vomiting, lower back pain or any other concerning symptoms  Follow up with PCP in 3-5 days  Proceed to  ER if symptoms worsen  Chief Complaint     Chief Complaint   Patient presents with    Possible UTI     x 5 days c/o urgency and pain         History of Present Illness       Urinary Tract Infection    This is a new problem  The current episode started yesterday  The problem occurs every urination  The problem has been unchanged  The quality of the pain is described as aching and burning  The pain is at a severity of 5/10  The pain is mild  There has been no fever  She is sexually active  There is no history of pyelonephritis  Associated symptoms include frequency, hematuria and urgency  Pertinent negatives include no chills, discharge, flank pain, hesitancy, nausea, possible pregnancy, sweats or vomiting  She has tried nothing for the symptoms  Review of Systems   Review of Systems   Constitutional: Negative for chills, fatigue and fever  HENT: Negative for congestion, ear pain, hearing loss, postnasal drip, sinus pain, sinus pressure and sore throat  Eyes: Negative for pain and discharge  Respiratory: Negative for chest tightness and shortness of breath  Cardiovascular: Negative for chest pain  Gastrointestinal: Negative for abdominal pain, constipation, nausea and vomiting  Genitourinary: Positive for frequency, hematuria and urgency   Negative for difficulty urinating, flank pain and hesitancy  Musculoskeletal: Negative for arthralgias and myalgias  Skin: Negative for rash  Neurological: Negative for dizziness and headaches  Psychiatric/Behavioral: Negative for behavioral problems  Current Medications       Current Outpatient Prescriptions:     Cholecalciferol (VITAMIN D3) 1000 units CAPS, Take 2 capsules by mouth daily, Disp: , Rfl:     hydrochlorothiazide (HYDRODIURIL) 25 mg tablet, Take 1 tablet (25 mg total) by mouth daily, Disp: 90 tablet, Rfl: 0    loratadine (CLARITIN) 10 mg tablet, Take 1 tablet by mouth daily, Disp: , Rfl:     norgestimate-ethinyl estradiol (TRI-LINYAH) 0 18/0 215/0 25 MG-35 MCG per tablet, Take 1 tablet by mouth daily, Disp: , Rfl:     rOPINIRole (REQUIP) 1 mg tablet, Take by mouth, Disp: , Rfl:     vitamin E, tocopherol, (CVS VITAMIN E) 1,000 units capsule, Take 1 capsule by mouth daily, Disp: , Rfl:     ALPRAZolam (XANAX) 0 5 mg tablet, Take 1 tablet (0 5 mg total) by mouth daily as needed for anxiety for up to 30 days, Disp: 30 tablet, Rfl: 0    nitrofurantoin (MACROBID) 100 mg capsule, Take 1 capsule (100 mg total) by mouth 2 (two) times a day for 7 days, Disp: 14 capsule, Rfl: 0    Current Allergies     Allergies as of 08/04/2018 - Reviewed 08/04/2018   Allergen Reaction Noted    Amoxicillin Edema 08/13/2015            The following portions of the patient's history were reviewed and updated as appropriate: allergies, current medications, past family history, past medical history, past social history, past surgical history and problem list      Past Medical History:   Diagnosis Date    Hypertension        History reviewed  No pertinent surgical history  Family History   Problem Relation Age of Onset    Hypertension Mother     Diabetes Mother     Thyroid disease Mother     Hypertension Father     Diabetes Father          Medications have been verified          Objective   /90   Pulse 92   Temp 97 6 °F (36 4 °C) (Temporal)   Resp 12   Ht 5' (1 524 m)   Wt 71 1 kg (156 lb 12 8 oz)   LMP 07/15/2018   SpO2 100%   BMI 30 62 kg/m²        Physical Exam     Physical Exam   Constitutional: She is oriented to person, place, and time  She appears well-developed and well-nourished  Cardiovascular: Normal rate, regular rhythm and normal heart sounds  No murmur heard  Pulmonary/Chest: Effort normal and breath sounds normal  No respiratory distress  Abdominal: Soft  Normal appearance and bowel sounds are normal  She exhibits no distension  There is tenderness in the suprapubic area  There is no rigidity, no rebound, no guarding, no CVA tenderness, no tenderness at McBurney's point and negative Saenz's sign  Neurological: She is alert and oriented to person, place, and time  Psychiatric: She has a normal mood and affect  Nursing note and vitals reviewed

## 2018-08-05 LAB — BACTERIA UR CULT: NORMAL

## 2018-09-26 DIAGNOSIS — I10 HYPERTENSION, UNSPECIFIED TYPE: ICD-10-CM

## 2018-09-26 RX ORDER — HYDROCHLOROTHIAZIDE 25 MG/1
25 TABLET ORAL DAILY
Qty: 90 TABLET | Refills: 3 | Status: SHIPPED | OUTPATIENT
Start: 2018-09-26 | End: 2019-09-20 | Stop reason: SDUPTHER

## 2018-10-08 DIAGNOSIS — G43.009 MIGRAINE WITHOUT AURA AND WITHOUT STATUS MIGRAINOSUS, NOT INTRACTABLE: Primary | ICD-10-CM

## 2018-10-08 RX ORDER — RIZATRIPTAN BENZOATE 10 MG/1
10 TABLET ORAL ONCE AS NEEDED
Qty: 9 TABLET | Refills: 1 | Status: SHIPPED | OUTPATIENT
Start: 2018-10-08 | End: 2019-10-13

## 2018-10-24 ENCOUNTER — TELEPHONE (OUTPATIENT)
Dept: FAMILY MEDICINE CLINIC | Facility: CLINIC | Age: 38
End: 2018-10-24

## 2018-10-24 NOTE — TELEPHONE ENCOUNTER
Letter has been printed  Remind patient that she must keep all medications in their original bottles

## 2018-10-24 NOTE — TELEPHONE ENCOUNTER
Lee Ann going on a trip and would like you to write a letter stating the medications she is on for her to take along     requip  Xanax   BP Med     Please fax to [phone]

## 2018-11-09 ENCOUNTER — TELEPHONE (OUTPATIENT)
Dept: OBGYN CLINIC | Facility: CLINIC | Age: 38
End: 2018-11-09

## 2018-11-19 ENCOUNTER — ANNUAL EXAM (OUTPATIENT)
Dept: OBGYN CLINIC | Facility: MEDICAL CENTER | Age: 38
End: 2018-11-19
Payer: COMMERCIAL

## 2018-11-19 VITALS — DIASTOLIC BLOOD PRESSURE: 78 MMHG | BODY MASS INDEX: 29.96 KG/M2 | WEIGHT: 153.4 LBS | SYSTOLIC BLOOD PRESSURE: 122 MMHG

## 2018-11-19 DIAGNOSIS — Z01.419 ENCOUNTER FOR GYNECOLOGICAL EXAMINATION (GENERAL) (ROUTINE) WITHOUT ABNORMAL FINDINGS: Primary | ICD-10-CM

## 2018-11-19 DIAGNOSIS — Z30.41 ORAL CONTRACEPTIVE PILL SURVEILLANCE: ICD-10-CM

## 2018-11-19 PROCEDURE — 99395 PREV VISIT EST AGE 18-39: CPT | Performed by: PHYSICIAN ASSISTANT

## 2018-11-19 RX ORDER — NORGESTIMATE AND ETHINYL ESTRADIOL 7DAYSX3 28
1 KIT ORAL DAILY
Qty: 84 TABLET | Refills: 3 | Status: SHIPPED | OUTPATIENT
Start: 2018-11-19 | End: 2019-06-03 | Stop reason: ALTCHOICE

## 2018-11-19 NOTE — PROGRESS NOTES
Assessment/Plan  Problem List Items Addressed This Visit     Encounter for gynecological examination (general) (routine) without abnormal findings - Primary     Annual exam performed  OCP refill sent via EMR  RTO 1 year           Other Visit Diagnoses     Oral contraceptive pill surveillance        Relevant Medications    norgestimate-ethinyl estradiol (Karishma Bibber) 0 18/0 215/0 25 MG-35 MCG per tablet        Lawanda Leyva is a 41 y o  female who presents for annual GYN exam  She denies any changes in Medical or Surgical histories  Mother had hysterectomy for menorrhagia, pathology showed early cervical or uterine cancer, had radiation  Periods are regular every 28-30 days on trilinyah, gets migraine first day of placebos  Dysmenorrhea:none  She denies intermenstrual bleeding, spotting, or discharge  She reports that she is sexually active with 1 partner and using OCP (estrogen/progesterone) for contraception  Last Pap smear: 2017  Regular self breast exam: yes    Family history of uterine or ovarian cancer: possible mother, pt will find out and update us  Family history of breast cancer: no  Family history of colon cancer: no    Menstrual History:  OB History      Para Term  AB Living    2 1            SAB TAB Ectopic Multiple Live Births                     Obstetric Comments    1          Patient's last menstrual period was 2018 (exact date)  Period Pattern: Regular  Menstrual Flow: Moderate    The following portions of the patient's history were reviewed and updated as appropriate: allergies, current medications, past family history, past medical history, past social history, past surgical history and problem list     Review of Systems  Review of Systems   Constitutional: Negative for chills and fever  Respiratory: Negative for shortness of breath  Cardiovascular: Negative for chest pain  Gastrointestinal: Negative for abdominal pain     Genitourinary: Negative for dysuria, pelvic pain, vaginal bleeding, vaginal discharge and vaginal pain  Negative for breast pain or lumps  Negative for stress urinary incontinence  Neurological: Negative for headaches  Objective   /78 (BP Location: Right arm)   Wt 69 6 kg (153 lb 6 4 oz)   LMP 11/06/2018 (Exact Date)   BMI 29 96 kg/m²     Physical Exam   Constitutional: She is oriented to person, place, and time  She appears well-developed and well-nourished  Neck: No thyromegaly present  Cardiovascular: Normal rate and regular rhythm  Pulmonary/Chest: Effort normal and breath sounds normal  Right breast exhibits no mass, no nipple discharge, no skin change and no tenderness  Left breast exhibits no mass, no nipple discharge, no skin change and no tenderness  Abdominal: Soft  There is no tenderness  There is no rebound and no guarding  Genitourinary: There is no rash or lesion on the right labia  There is no rash or lesion on the left labia  Uterus is not enlarged and not tender  Cervix exhibits no motion tenderness and no discharge  Right adnexum displays no mass and no tenderness  Left adnexum displays no mass and no tenderness  No bleeding in the vagina  No vaginal discharge found  Neurological: She is alert and oriented to person, place, and time  Psychiatric: She has a normal mood and affect  Nursing note and vitals reviewed

## 2018-12-10 DIAGNOSIS — G25.81 RLS (RESTLESS LEGS SYNDROME): Primary | ICD-10-CM

## 2018-12-10 RX ORDER — ROPINIROLE 1 MG/1
1 TABLET, FILM COATED ORAL
Qty: 90 TABLET | Refills: 3 | Status: SHIPPED | OUTPATIENT
Start: 2018-12-10 | End: 2019-09-13 | Stop reason: SDUPTHER

## 2019-03-20 ENCOUNTER — OFFICE VISIT (OUTPATIENT)
Dept: FAMILY MEDICINE CLINIC | Facility: CLINIC | Age: 39
End: 2019-03-20
Payer: COMMERCIAL

## 2019-03-20 VITALS
WEIGHT: 153 LBS | OXYGEN SATURATION: 98 % | BODY MASS INDEX: 30.04 KG/M2 | DIASTOLIC BLOOD PRESSURE: 68 MMHG | HEIGHT: 60 IN | RESPIRATION RATE: 18 BRPM | HEART RATE: 85 BPM | SYSTOLIC BLOOD PRESSURE: 124 MMHG

## 2019-03-20 DIAGNOSIS — I10 ESSENTIAL HYPERTENSION: Primary | ICD-10-CM

## 2019-03-20 DIAGNOSIS — F41.9 ANXIETY: ICD-10-CM

## 2019-03-20 DIAGNOSIS — F41.1 GAD (GENERALIZED ANXIETY DISORDER): ICD-10-CM

## 2019-03-20 DIAGNOSIS — L98.9 SKIN LESION: ICD-10-CM

## 2019-03-20 DIAGNOSIS — E66.9 OBESITY (BMI 30-39.9): ICD-10-CM

## 2019-03-20 DIAGNOSIS — R23.2 HOT FLASHES: ICD-10-CM

## 2019-03-20 DIAGNOSIS — G43.009 MIGRAINE WITHOUT AURA AND WITHOUT STATUS MIGRAINOSUS, NOT INTRACTABLE: ICD-10-CM

## 2019-03-20 PROCEDURE — 3078F DIAST BP <80 MM HG: CPT | Performed by: NURSE PRACTITIONER

## 2019-03-20 PROCEDURE — 3074F SYST BP LT 130 MM HG: CPT | Performed by: NURSE PRACTITIONER

## 2019-03-20 PROCEDURE — 99214 OFFICE O/P EST MOD 30 MIN: CPT | Performed by: NURSE PRACTITIONER

## 2019-03-20 PROCEDURE — 1036F TOBACCO NON-USER: CPT | Performed by: NURSE PRACTITIONER

## 2019-03-20 PROCEDURE — 3008F BODY MASS INDEX DOCD: CPT | Performed by: NURSE PRACTITIONER

## 2019-03-20 RX ORDER — ALPRAZOLAM 0.5 MG/1
0.5 TABLET ORAL DAILY PRN
Qty: 90 TABLET | Refills: 0 | Status: SHIPPED | OUTPATIENT
Start: 2019-03-20 | End: 2019-03-20 | Stop reason: SDUPTHER

## 2019-03-20 RX ORDER — ALPRAZOLAM 0.5 MG/1
0.5 TABLET ORAL DAILY PRN
Qty: 90 TABLET | Refills: 0 | Status: SHIPPED | OUTPATIENT
Start: 2019-03-20 | End: 2019-09-21 | Stop reason: SDUPTHER

## 2019-03-20 NOTE — PATIENT INSTRUCTIONS
Hypertension- stable  Obesity- encourage daily exercise  Encourage diet high in fruits and veggies  Limit calories  Migraine headaches and hot flashes  - Check thyroid labs and hormone levels  Consider changing OCP  Anxiety-anxiety is not present daily  Continue to use xanax as needed  Skin lesion on left leg- pt will return when lesion returns and she doesn't pick it off  Check labs   Our office will call with results and we can discuss next step at that time

## 2019-03-20 NOTE — PROGRESS NOTES
Assessment/Plan:       Diagnoses and all orders for this visit:    Essential hypertension  -     CBC and differential; Future  -     Comprehensive metabolic panel; Future  -     TSH, 3rd generation; Future  -     FSH and LH; Future  -     Progesterone; Future    DEBBIE (generalized anxiety disorder)  -     CBC and differential; Future  -     Comprehensive metabolic panel; Future  -     TSH, 3rd generation; Future  -     FSH and LH; Future  -     Progesterone; Future    Migraine without aura and without status migrainosus, not intractable  -     CBC and differential; Future  -     Comprehensive metabolic panel; Future  -     TSH, 3rd generation; Future  -     FSH and LH; Future  -     Progesterone; Future    Hot flashes  -     CBC and differential; Future  -     Comprehensive metabolic panel; Future  -     TSH, 3rd generation; Future  -     FSH and LH; Future  -     Progesterone; Future    Obesity (BMI 30-39 9)  -     CBC and differential; Future  -     Comprehensive metabolic panel; Future  -     TSH, 3rd generation; Future  -     FSH and LH; Future  -     Progesterone; Future    Skin lesion    Anxiety  -     ALPRAZolam (XANAX) 0 5 mg tablet; Take 1 tablet (0 5 mg total) by mouth daily as needed for anxiety for up to 90 days        No problem-specific Assessment & Plan notes found for this encounter  Subjective:      Patient ID: Erin Pope is a 41 y o  female  Patient is here to discuss a few issues  She has a lesion on the left side of her thigh  The skin dries and then the area bleeds and then the grows back  Anxiety- exacerbated  She slipped on ice in her car and this gave her panic attacks and now her anxiety has not improved  Migraine headaches- seems to correlate with her menstruation cycle and BCP  Headaches also triggered by her anxiety  She tried Maxalt  She has also started having hot flashes     Current BCP- restarted them at the end of 2018      The following portions of the patient's history were reviewed and updated as appropriate:   She has a past medical history of Hypertension  ,  does not have any pertinent problems on file  ,   has a past surgical history that includes Larynx surgery (2002) and Eye surgery  ,  family history includes Diabetes in her father and maternal grandmother; Hypertension in her father and mother; Thyroid disease in her mother  ,   reports that she has never smoked  She has never used smokeless tobacco  She reports that she drinks alcohol  She reports that she does not use drugs  ,  is allergic to amoxicillin     Current Outpatient Medications   Medication Sig Dispense Refill    ALPRAZolam (XANAX) 0 5 mg tablet Take 1 tablet (0 5 mg total) by mouth daily as needed for anxiety for up to 30 days 30 tablet 0    Cholecalciferol (VITAMIN D3) 1000 units CAPS Take 2 capsules by mouth daily      hydrochlorothiazide (HYDRODIURIL) 25 mg tablet Take 1 tablet (25 mg total) by mouth daily 90 tablet 3    loratadine (CLARITIN) 10 mg tablet Take 1 tablet by mouth daily      norgestimate-ethinyl estradiol (TRI-LINYAH) 0 18/0 215/0 25 MG-35 MCG per tablet Take 1 tablet by mouth daily 84 tablet 3    rizatriptan (MAXALT) 10 MG tablet Take 1 tablet (10 mg total) by mouth once as needed for migraine for up to 1 dose May repeat in 2 hours if needed 9 tablet 1    rOPINIRole (REQUIP) 1 mg tablet Take 1 tablet (1 mg total) by mouth daily at bedtime 90 tablet 3    vitamin E, tocopherol, (CVS VITAMIN E) 1,000 units capsule Take 1 capsule by mouth daily       No current facility-administered medications for this visit  Review of Systems   Constitutional: Negative  Negative for fatigue and fever  HENT: Negative  Negative for congestion  Eyes: Negative  Negative for visual disturbance  Respiratory: Negative for cough, chest tightness, shortness of breath and wheezing  Cardiovascular: Negative  Gastrointestinal: Negative    Negative for abdominal pain, blood in stool, diarrhea and nausea  Endocrine: Negative for polydipsia, polyphagia and polyuria  Hot flashes   Genitourinary: Negative for difficulty urinating and flank pain  Musculoskeletal: Negative  Negative for arthralgias, back pain and myalgias  Skin: Negative  Negative for color change, pallor and rash  Allergic/Immunologic: Negative for immunocompromised state  Neurological: Positive for headaches  Negative for dizziness, weakness, light-headedness and numbness  Hematological: Negative for adenopathy  Psychiatric/Behavioral: Negative for confusion, decreased concentration and sleep disturbance  The patient is nervous/anxious  All other systems reviewed and are negative  Objective:  Vitals:    03/20/19 1756   BP: 124/68   BP Location: Left arm   Patient Position: Sitting   Pulse: 85   Resp: 18   SpO2: 98%   Weight: 69 4 kg (153 lb)   Height: 5' (1 524 m)     Body mass index is 29 88 kg/m²  Physical Exam   Constitutional: She is oriented to person, place, and time  She appears well-developed and well-nourished  No distress  HENT:   Head: Normocephalic and atraumatic  Nose: Nose normal    Mouth/Throat: No oropharyngeal exudate  Eyes: Pupils are equal, round, and reactive to light  Conjunctivae are normal  No scleral icterus  Neck: Normal range of motion  Neck supple  No JVD present  Cardiovascular: Normal rate, regular rhythm, normal heart sounds and intact distal pulses  Exam reveals no gallop and no friction rub  No murmur heard  Pulmonary/Chest: Effort normal and breath sounds normal  No respiratory distress  Abdominal: Soft  Musculoskeletal: Normal range of motion  She exhibits no edema  Lymphadenopathy:     She has no cervical adenopathy  Neurological: She is alert and oriented to person, place, and time  Coordination normal    Skin: Skin is warm and dry  No rash noted  She is not diaphoretic  Scabbed lesion of left upper thigh      Psychiatric: She has a normal mood and affect  Her behavior is normal  Judgment and thought content normal    Nursing note and vitals reviewed

## 2019-03-22 ENCOUNTER — TELEPHONE (OUTPATIENT)
Dept: FAMILY MEDICINE CLINIC | Facility: CLINIC | Age: 39
End: 2019-03-22

## 2019-03-22 DIAGNOSIS — J01.00 ACUTE NON-RECURRENT MAXILLARY SINUSITIS: Primary | ICD-10-CM

## 2019-03-22 DIAGNOSIS — J01.00 ACUTE NON-RECURRENT MAXILLARY SINUSITIS: ICD-10-CM

## 2019-03-22 RX ORDER — AZITHROMYCIN 250 MG/1
TABLET, FILM COATED ORAL
Qty: 6 TABLET | Refills: 0 | Status: SHIPPED | OUTPATIENT
Start: 2019-03-22 | End: 2019-03-22 | Stop reason: SDUPTHER

## 2019-03-22 RX ORDER — AZITHROMYCIN 250 MG/1
TABLET, FILM COATED ORAL
Qty: 6 TABLET | Refills: 0 | Status: SHIPPED | OUTPATIENT
Start: 2019-03-22 | End: 2019-03-24 | Stop reason: SDUPTHER

## 2019-03-22 NOTE — TELEPHONE ENCOUNTER
rx sent for the z-pack but is failing to send to pharmacy please find out which pharmacy she prefers

## 2019-03-22 NOTE — TELEPHONE ENCOUNTER
This was sent to katheryn but since she isnt here I sent it to you  Good morning Katheryn,   I was just in on Wednesday night with you and woke Thursday with sinus pressure and ear pain  The sx really hit me last night and with the post nasal drip I did not sleep at all  Wondering if you could call in a Z-Pack to [facility] in [place]  I took the day off due to no sleep  Thank you       Tushar Smiley

## 2019-03-24 DIAGNOSIS — J01.00 ACUTE NON-RECURRENT MAXILLARY SINUSITIS: ICD-10-CM

## 2019-03-24 RX ORDER — AZITHROMYCIN 250 MG/1
TABLET, FILM COATED ORAL
Qty: 6 TABLET | Refills: 0 | Status: SHIPPED | OUTPATIENT
Start: 2019-03-24 | End: 2019-03-24 | Stop reason: SDUPTHER

## 2019-03-24 RX ORDER — AZITHROMYCIN 250 MG/1
TABLET, FILM COATED ORAL
Qty: 6 TABLET | Refills: 0 | Status: SHIPPED | OUTPATIENT
Start: 2019-03-24 | End: 2019-03-28

## 2019-04-23 ENCOUNTER — OFFICE VISIT (OUTPATIENT)
Dept: FAMILY MEDICINE CLINIC | Facility: CLINIC | Age: 39
End: 2019-04-23
Payer: COMMERCIAL

## 2019-04-23 VITALS
HEART RATE: 73 BPM | WEIGHT: 153 LBS | SYSTOLIC BLOOD PRESSURE: 118 MMHG | RESPIRATION RATE: 18 BRPM | DIASTOLIC BLOOD PRESSURE: 72 MMHG | HEIGHT: 60 IN | BODY MASS INDEX: 30.04 KG/M2 | OXYGEN SATURATION: 98 %

## 2019-04-23 DIAGNOSIS — L98.9 SKIN LESION: Primary | ICD-10-CM

## 2019-04-23 PROCEDURE — 99214 OFFICE O/P EST MOD 30 MIN: CPT | Performed by: NURSE PRACTITIONER

## 2019-04-23 PROCEDURE — 1036F TOBACCO NON-USER: CPT | Performed by: NURSE PRACTITIONER

## 2019-04-23 PROCEDURE — 3008F BODY MASS INDEX DOCD: CPT | Performed by: NURSE PRACTITIONER

## 2019-04-24 PROCEDURE — 88305 TISSUE EXAM BY PATHOLOGIST: CPT | Performed by: PATHOLOGY

## 2019-04-27 DIAGNOSIS — L85.9 HYPERKERATOSIS: Primary | ICD-10-CM

## 2019-05-13 ENCOUNTER — APPOINTMENT (OUTPATIENT)
Dept: LAB | Facility: HOSPITAL | Age: 39
End: 2019-05-13
Payer: COMMERCIAL

## 2019-05-13 ENCOUNTER — TRANSCRIBE ORDERS (OUTPATIENT)
Dept: ADMINISTRATIVE | Facility: HOSPITAL | Age: 39
End: 2019-05-13

## 2019-05-13 DIAGNOSIS — F41.1 GAD (GENERALIZED ANXIETY DISORDER): ICD-10-CM

## 2019-05-13 DIAGNOSIS — G43.009 MIGRAINE WITHOUT AURA AND WITHOUT STATUS MIGRAINOSUS, NOT INTRACTABLE: ICD-10-CM

## 2019-05-13 DIAGNOSIS — R23.2 HOT FLASHES: ICD-10-CM

## 2019-05-13 DIAGNOSIS — E66.9 OBESITY (BMI 30-39.9): ICD-10-CM

## 2019-05-13 DIAGNOSIS — I10 ESSENTIAL HYPERTENSION: ICD-10-CM

## 2019-05-13 LAB
BASOPHILS # BLD AUTO: 0.03 THOUSANDS/ΜL (ref 0–0.1)
BASOPHILS NFR BLD AUTO: 0 % (ref 0–1)
EOSINOPHIL # BLD AUTO: 0.12 THOUSAND/ΜL (ref 0–0.61)
EOSINOPHIL NFR BLD AUTO: 1 % (ref 0–6)
ERYTHROCYTE [DISTWIDTH] IN BLOOD BY AUTOMATED COUNT: 11.9 % (ref 11.6–15.1)
FSH SERPL-ACNC: 4.8 MIU/ML
HCT VFR BLD AUTO: 42.5 % (ref 34.8–46.1)
HGB BLD-MCNC: 14.2 G/DL (ref 11.5–15.4)
IMM GRANULOCYTES # BLD AUTO: 0.04 THOUSAND/UL (ref 0–0.2)
IMM GRANULOCYTES NFR BLD AUTO: 1 % (ref 0–2)
LH SERPL-ACNC: 5.3 MIU/ML
LYMPHOCYTES # BLD AUTO: 2.2 THOUSANDS/ΜL (ref 0.6–4.47)
LYMPHOCYTES NFR BLD AUTO: 25 % (ref 14–44)
MCH RBC QN AUTO: 28.7 PG (ref 26.8–34.3)
MCHC RBC AUTO-ENTMCNC: 33.4 G/DL (ref 31.4–37.4)
MCV RBC AUTO: 86 FL (ref 82–98)
MONOCYTES # BLD AUTO: 0.61 THOUSAND/ΜL (ref 0.17–1.22)
MONOCYTES NFR BLD AUTO: 7 % (ref 4–12)
NEUTROPHILS # BLD AUTO: 5.84 THOUSANDS/ΜL (ref 1.85–7.62)
NEUTS SEG NFR BLD AUTO: 66 % (ref 43–75)
NRBC BLD AUTO-RTO: 0 /100 WBCS
PLATELET # BLD AUTO: 279 THOUSANDS/UL (ref 149–390)
PMV BLD AUTO: 11.6 FL (ref 8.9–12.7)
PROGEST SERPL-MCNC: <0.2 NG/ML
RBC # BLD AUTO: 4.95 MILLION/UL (ref 3.81–5.12)
TSH SERPL DL<=0.05 MIU/L-ACNC: 2.34 UIU/ML (ref 0.36–3.74)
WBC # BLD AUTO: 8.84 THOUSAND/UL (ref 4.31–10.16)

## 2019-05-13 PROCEDURE — 36415 COLL VENOUS BLD VENIPUNCTURE: CPT

## 2019-05-13 PROCEDURE — 85025 COMPLETE CBC W/AUTO DIFF WBC: CPT

## 2019-05-13 PROCEDURE — 83002 ASSAY OF GONADOTROPIN (LH): CPT

## 2019-05-13 PROCEDURE — 83001 ASSAY OF GONADOTROPIN (FSH): CPT

## 2019-05-13 PROCEDURE — 84144 ASSAY OF PROGESTERONE: CPT

## 2019-05-13 PROCEDURE — 84443 ASSAY THYROID STIM HORMONE: CPT

## 2019-06-03 DIAGNOSIS — G43.009 MIGRAINE WITHOUT AURA AND WITHOUT STATUS MIGRAINOSUS, NOT INTRACTABLE: ICD-10-CM

## 2019-06-03 DIAGNOSIS — N94.3 PMS (PREMENSTRUAL SYNDROME): Primary | ICD-10-CM

## 2019-06-03 RX ORDER — NORETHINDRONE ACETATE AND ETHINYL ESTRADIOL 1; .02 MG/1; MG/1
1 TABLET ORAL DAILY
Qty: 90 TABLET | Refills: 3 | Status: SHIPPED | OUTPATIENT
Start: 2019-06-03 | End: 2019-06-04 | Stop reason: SDUPTHER

## 2019-06-04 DIAGNOSIS — N94.3 PMS (PREMENSTRUAL SYNDROME): ICD-10-CM

## 2019-06-04 DIAGNOSIS — G43.009 MIGRAINE WITHOUT AURA AND WITHOUT STATUS MIGRAINOSUS, NOT INTRACTABLE: ICD-10-CM

## 2019-06-04 RX ORDER — NORETHINDRONE ACETATE AND ETHINYL ESTRADIOL 1; .02 MG/1; MG/1
1 TABLET ORAL DAILY
Qty: 90 TABLET | Refills: 3 | Status: SHIPPED | OUTPATIENT
Start: 2019-06-04 | End: 2019-10-13

## 2019-09-13 DIAGNOSIS — G25.81 RLS (RESTLESS LEGS SYNDROME): ICD-10-CM

## 2019-09-13 RX ORDER — ROPINIROLE 1 MG/1
1 TABLET, FILM COATED ORAL
Qty: 30 TABLET | Refills: 0 | Status: SHIPPED | OUTPATIENT
Start: 2019-09-13 | End: 2019-10-09 | Stop reason: SDUPTHER

## 2019-09-20 DIAGNOSIS — F41.9 ANXIETY: ICD-10-CM

## 2019-09-20 DIAGNOSIS — I10 HYPERTENSION, UNSPECIFIED TYPE: ICD-10-CM

## 2019-09-20 RX ORDER — ALPRAZOLAM 0.5 MG/1
0.5 TABLET ORAL DAILY PRN
Qty: 90 TABLET | Refills: 0 | Status: CANCELLED | OUTPATIENT
Start: 2019-09-20 | End: 2019-12-19

## 2019-09-20 RX ORDER — HYDROCHLOROTHIAZIDE 25 MG/1
25 TABLET ORAL DAILY
Qty: 90 TABLET | Refills: 0 | Status: SHIPPED | OUTPATIENT
Start: 2019-09-20 | End: 2020-01-06 | Stop reason: SDUPTHER

## 2019-09-21 DIAGNOSIS — F41.9 ANXIETY: ICD-10-CM

## 2019-09-21 RX ORDER — ALPRAZOLAM 0.5 MG/1
0.5 TABLET ORAL
Qty: 30 TABLET | Refills: 0 | Status: SHIPPED | OUTPATIENT
Start: 2019-09-21 | End: 2019-09-21 | Stop reason: SDUPTHER

## 2019-09-21 RX ORDER — ALPRAZOLAM 0.5 MG/1
0.5 TABLET ORAL
Qty: 30 TABLET | Refills: 0 | Status: SHIPPED | OUTPATIENT
Start: 2019-09-21 | End: 2019-10-13

## 2019-09-27 NOTE — TELEPHONE ENCOUNTER
Patient called back an notified of needing an appt for her next refill and to get an agreement signed

## 2019-10-09 DIAGNOSIS — G25.81 RLS (RESTLESS LEGS SYNDROME): ICD-10-CM

## 2019-10-09 RX ORDER — ROPINIROLE 1 MG/1
1 TABLET, FILM COATED ORAL
Qty: 30 TABLET | Refills: 3 | Status: SHIPPED | OUTPATIENT
Start: 2019-10-09 | End: 2019-10-09 | Stop reason: SDUPTHER

## 2019-10-09 RX ORDER — ROPINIROLE 1 MG/1
1 TABLET, FILM COATED ORAL
Qty: 90 TABLET | Refills: 0 | Status: SHIPPED | OUTPATIENT
Start: 2019-10-09 | End: 2020-01-06 | Stop reason: SDUPTHER

## 2019-10-13 ENCOUNTER — HOSPITAL ENCOUNTER (EMERGENCY)
Facility: HOSPITAL | Age: 39
Discharge: HOME | End: 2019-10-13
Attending: FAMILY MEDICINE
Payer: COMMERCIAL

## 2019-10-13 ENCOUNTER — APPOINTMENT (EMERGENCY)
Dept: CT IMAGING | Facility: HOSPITAL | Age: 39
End: 2019-10-13
Payer: COMMERCIAL

## 2019-10-13 VITALS
DIASTOLIC BLOOD PRESSURE: 83 MMHG | OXYGEN SATURATION: 100 % | WEIGHT: 152 LBS | HEART RATE: 80 BPM | SYSTOLIC BLOOD PRESSURE: 135 MMHG | RESPIRATION RATE: 16 BRPM | BODY MASS INDEX: 29.84 KG/M2 | TEMPERATURE: 96.8 F | HEIGHT: 60 IN

## 2019-10-13 DIAGNOSIS — T14.8XXA MUSCLE STRAIN: Primary | ICD-10-CM

## 2019-10-13 DIAGNOSIS — T07.XXXA ABRASIONS OF MULTIPLE SITES: ICD-10-CM

## 2019-10-13 DIAGNOSIS — S09.90XA HEAD INJURY DUE TO TRAUMA: ICD-10-CM

## 2019-10-13 PROCEDURE — 99284 EMERGENCY DEPT VISIT MOD MDM: CPT

## 2019-10-13 PROCEDURE — 72125 CT NECK SPINE W/O DYE: CPT

## 2019-10-13 PROCEDURE — 70450 CT HEAD/BRAIN W/O DYE: CPT

## 2019-10-13 RX ORDER — METHOCARBAMOL 750 MG/1
750 TABLET, FILM COATED ORAL 4 TIMES DAILY
Qty: 20 TABLET | Refills: 0 | Status: SHIPPED | OUTPATIENT
Start: 2019-10-13 | End: 2019-10-18 | Stop reason: ALTCHOICE

## 2019-10-13 RX ORDER — IBUPROFEN 800 MG/1
800 TABLET ORAL 3 TIMES DAILY
Qty: 21 TABLET | Refills: 0 | Status: SHIPPED | OUTPATIENT
Start: 2019-10-13 | End: 2019-10-18 | Stop reason: SDUPTHER

## 2019-10-13 RX ORDER — IBUPROFEN 800 MG/1
800 TABLET ORAL 3 TIMES DAILY
Qty: 21 TABLET | Refills: 0 | Status: SHIPPED | OUTPATIENT
Start: 2019-10-13 | End: 2019-10-13 | Stop reason: SDUPTHER

## 2019-10-13 RX ORDER — METHOCARBAMOL 750 MG/1
750 TABLET, FILM COATED ORAL 4 TIMES DAILY
Qty: 20 TABLET | Refills: 0 | Status: SHIPPED | OUTPATIENT
Start: 2019-10-13 | End: 2019-10-13 | Stop reason: SDUPTHER
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
## 2019-10-17 DIAGNOSIS — M62.838 MUSCLE SPASM: Primary | ICD-10-CM

## 2019-10-17 DIAGNOSIS — M62.838 MUSCLE SPASM: ICD-10-CM

## 2019-10-17 RX ORDER — DIAZEPAM 5 MG/1
5 TABLET ORAL EVERY 12 HOURS PRN
Qty: 3 TABLET | Refills: 0 | Status: SHIPPED | OUTPATIENT
Start: 2019-10-17 | End: 2019-10-18 | Stop reason: ALTCHOICE

## 2019-10-17 RX ORDER — DIAZEPAM 5 MG/1
5 TABLET ORAL EVERY 12 HOURS PRN
Qty: 3 TABLET | Refills: 0 | Status: SHIPPED | OUTPATIENT
Start: 2019-10-17 | End: 2019-10-17 | Stop reason: SDUPTHER

## 2019-10-18 ENCOUNTER — OFFICE VISIT (OUTPATIENT)
Dept: FAMILY MEDICINE CLINIC | Facility: CLINIC | Age: 39
End: 2019-10-18
Payer: COMMERCIAL

## 2019-10-18 VITALS
WEIGHT: 148.6 LBS | DIASTOLIC BLOOD PRESSURE: 84 MMHG | SYSTOLIC BLOOD PRESSURE: 122 MMHG | OXYGEN SATURATION: 96 % | TEMPERATURE: 98.2 F | BODY MASS INDEX: 29.17 KG/M2 | HEART RATE: 91 BPM | HEIGHT: 60 IN
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
## 2019-10-18 DIAGNOSIS — T14.8XXA MUSCLE STRAIN: ICD-10-CM

## 2019-10-18 DIAGNOSIS — M54.2 CERVICAL MUSCLE PAIN: Primary | ICD-10-CM

## 2019-10-18 DIAGNOSIS — S09.90XD TRAUMATIC INJURY OF HEAD, SUBSEQUENT ENCOUNTER: ICD-10-CM

## 2019-10-18 DIAGNOSIS — M25.511 ACUTE PAIN OF RIGHT SHOULDER: ICD-10-CM
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
## 2019-10-18 PROCEDURE — 3008F BODY MASS INDEX DOCD: CPT | Performed by: NURSE PRACTITIONER

## 2019-10-18 PROCEDURE — 99213 OFFICE O/P EST LOW 20 MIN: CPT | Performed by: NURSE PRACTITIONER

## 2019-10-18 RX ORDER — BACLOFEN 10 MG/1
10 TABLET ORAL 3 TIMES DAILY
Qty: 30 TABLET | Refills: 0 | Status: SHIPPED | OUTPATIENT
Start: 2019-10-18 | End: 2020-02-24

## 2019-10-18 RX ORDER — IBUPROFEN 800 MG/1
800 TABLET ORAL 3 TIMES DAILY
Qty: 45 TABLET | Refills: 0 | Status: SHIPPED | OUTPATIENT
Start: 2019-10-18 | End: 2020-02-24
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
## 2019-10-22 ENCOUNTER — EVALUATION (OUTPATIENT)
Dept: PHYSICAL THERAPY | Facility: CLINIC | Age: 39
End: 2019-10-22
Payer: COMMERCIAL

## 2019-10-22 DIAGNOSIS — M25.511 ACUTE PAIN OF RIGHT SHOULDER: ICD-10-CM

## 2019-10-22 DIAGNOSIS — S09.90XD TRAUMATIC INJURY OF HEAD, SUBSEQUENT ENCOUNTER: ICD-10-CM

## 2019-10-22 DIAGNOSIS — M54.2 CERVICAL MUSCLE PAIN: Primary | ICD-10-CM

## 2019-10-22 PROCEDURE — 97140 MANUAL THERAPY 1/> REGIONS: CPT | Performed by: PHYSICAL THERAPIST

## 2019-10-22 PROCEDURE — 97110 THERAPEUTIC EXERCISES: CPT | Performed by: PHYSICAL THERAPIST

## 2019-10-22 PROCEDURE — 97162 PT EVAL MOD COMPLEX 30 MIN: CPT | Performed by: PHYSICAL THERAPIST
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
## 2019-10-25 ENCOUNTER — OFFICE VISIT (OUTPATIENT)
Dept: PHYSICAL THERAPY | Facility: CLINIC | Age: 39
End: 2019-10-25
Payer: COMMERCIAL

## 2019-10-25 DIAGNOSIS — S09.90XD TRAUMATIC INJURY OF HEAD, SUBSEQUENT ENCOUNTER: Primary | ICD-10-CM

## 2019-10-25 DIAGNOSIS — M54.2 CERVICAL MUSCLE PAIN: ICD-10-CM

## 2019-10-25 PROCEDURE — 97110 THERAPEUTIC EXERCISES: CPT | Performed by: PHYSICAL THERAPIST

## 2019-10-25 PROCEDURE — 97140 MANUAL THERAPY 1/> REGIONS: CPT | Performed by: PHYSICAL THERAPIST

## 2019-10-25 NOTE — PROGRESS NOTES
Daily Note     Today's date: 10/25/2019  Patient name: Elvira Stephenson  : 1980  MRN: [id number]  Referring provider: RUMA Combs  Dx:   Encounter Diagnosis     ICD-10-CM    1  Traumatic injury of head, subsequent encounter S09  90XD    2  Cervical muscle pain M54 2                   Subjective: reports being sore the day after and than fine following day  Felt great rest of the evening and slept better  Pain today 4/10 in neck with limited motion       Objective: See treatment diary below    Hot pack 5 minutes:    Manuals: 30minutes   TRP UT, LV, paraspinal and suboccipital region BL with SCM  Muscle energy 1st rib BL 2 times     C spine stretches 15 min  UT: 30 sec hold 3 reps BL   LV: 30 sec hold 3 reps BL  Rotation: 30 sec hold 3 reps BL   standing door way pec stretch 30 sec hold 2 reps   Chin tucks 3 sec hold 10 reps       Assessment: Tolerated treatment well with reduction in pain to 0-1/10 advised to use cold pack for 15 minutes if sore the evening and following day  Advised to increased hydration Patient would benefit from continued PT      Plan: Continue per plan of care  Precautions:  has a past medical history of Hypertension

## 2019-10-28 ENCOUNTER — OFFICE VISIT (OUTPATIENT)
Dept: PHYSICAL THERAPY | Facility: CLINIC | Age: 39
End: 2019-10-28
Payer: COMMERCIAL

## 2019-10-28 DIAGNOSIS — S09.90XD TRAUMATIC INJURY OF HEAD, SUBSEQUENT ENCOUNTER: Primary | ICD-10-CM

## 2019-10-28 DIAGNOSIS — M54.2 CERVICAL MUSCLE PAIN: ICD-10-CM

## 2019-10-28 DIAGNOSIS — M25.511 ACUTE PAIN OF RIGHT SHOULDER: ICD-10-CM

## 2019-10-28 PROCEDURE — 97140 MANUAL THERAPY 1/> REGIONS: CPT

## 2019-10-28 PROCEDURE — 97110 THERAPEUTIC EXERCISES: CPT

## 2019-10-28 PROCEDURE — 97112 NEUROMUSCULAR REEDUCATION: CPT

## 2019-10-28 NOTE — PROGRESS NOTES
Daily Note     Today's date: 10/28/2019  Patient name: Khoi Crocker  : 1980  MRN: [id number]  Referring provider: RUMA Mejia  Dx:   Encounter Diagnosis     ICD-10-CM    1  Traumatic injury of head, subsequent encounter S09  90XD    2  Cervical muscle pain M54 2    3  Acute pain of right shoulder M25 511                   Subjective: Patient reports she has been feeling "okay" and felt "pretty good with the tape on " She notes when she gets the pain in her neck it is a 3-4/10  She reports 0-1/10 cervical pain and 3-4/10 R shoulder pain upon arrival to PT today  Objective: See treatment diary below    Manuals: 45 minutes   TRP UT, LV, paraspinal and suboccipital region BL with SCM  Muscle energy 1st rib BL 2 times   [place] Tape for posture correction    Mobilization with Movement:   R Glenohumeral inferior glide with active abduction    Patient education: 10 minutes      C spine stretches 15 min  UT: 30 sec hold 3 reps BL   LV: 30 sec hold 3 reps BL  Rotation: 30 sec hold 3 reps BL   standing door way pec stretch 30 sec hold 2 reps   Chin tucks 3 sec hold 10 reps       Assessment: Patient was able to tolerate treatment session well with decrease in pain to 0/10  Her shoulder pain decreased to 0/10 throughout movement with R inferior joint mobilization and [place] tape for posture correction  She will continue to benefit from skilled outpatient PT in order to maximize her function and reduce her pain  Plan: Continue per plan of care  Add shoulder stabilization exercises next session with [place] tape for posture correction  Precautions:  has a past medical history of Hypertension

## 2019-10-30 ENCOUNTER — APPOINTMENT (OUTPATIENT)
Dept: PHYSICAL THERAPY | Facility: CLINIC | Age: 39
End: 2019-10-30
Payer: COMMERCIAL

## 2019-11-04 ENCOUNTER — OFFICE VISIT (OUTPATIENT)
Dept: PHYSICAL THERAPY | Facility: CLINIC | Age: 39
End: 2019-11-04
Payer: COMMERCIAL

## 2019-11-04 DIAGNOSIS — M25.511 ACUTE PAIN OF RIGHT SHOULDER: ICD-10-CM

## 2019-11-04 DIAGNOSIS — S09.90XD TRAUMATIC INJURY OF HEAD, SUBSEQUENT ENCOUNTER: Primary | ICD-10-CM

## 2019-11-04 DIAGNOSIS — M54.2 CERVICAL MUSCLE PAIN: ICD-10-CM

## 2019-11-04 PROCEDURE — 97140 MANUAL THERAPY 1/> REGIONS: CPT

## 2019-11-04 PROCEDURE — 97110 THERAPEUTIC EXERCISES: CPT

## 2019-11-04 NOTE — PROGRESS NOTES
Daily Note     Today's date: 2019  Patient name: Aleksey Rocha  : 1980  MRN: [id number]  Referring provider: RUMA Chavez  Dx:   Encounter Diagnosis     ICD-10-CM    1  Traumatic injury of head, subsequent encounter S09  90XD    2  Cervical muscle pain M54 2    3  Acute pain of right shoulder M25 511                   Subjective: Patient reports she felt "really good" over the weekend and notes no cervical pain upon arrival to PT today  Objective: See treatment diary below    Manuals: 20 minutes   TRP UT, LV, paraspinal and suboccipital region BL with SCM    Mobilization with Movement:   R Glenohumeral inferior glide with active abduction      - TB Rows (Green TB) with scap retractions: 15 reps  - Serratus Star (Yellow TB): 5 cycles each  - Bilateral Shoulder ER with towel roll for tactile cue (Yellow TB): 15 reps      C spine stretches 15 min  UT: 30 sec hold 3 reps BL   LV: 30 sec hold 3 reps BL  Rotation: 30 sec hold 3 reps BL   standing door way pec stretch 30 sec hold 2 reps   Chin tucks 3 sec hold 10 reps       Assessment: Patient able to tolerate treatment session well today with decreased hypertonicity in UT, LV, and paraspinals  She demonstrated improved R shoulder AROM and decreased pain following PROM with inferior glide of GH joint  She was able to tolerate initiation of postural exercises with moderate verbal/tactile cueing and fair carryover  She will continue to benefit from skilled outpatient PT in order to maximize her function and decrease her symptoms  Plan: Continue per plan of care  Continue postural stabilization exercises  Precautions:  has a past medical history of Hypertension

## 2019-11-08 ENCOUNTER — OFFICE VISIT (OUTPATIENT)
Dept: PHYSICAL THERAPY | Facility: CLINIC | Age: 39
End: 2019-11-08
Payer: COMMERCIAL

## 2019-11-08 DIAGNOSIS — S09.90XD TRAUMATIC INJURY OF HEAD, SUBSEQUENT ENCOUNTER: Primary | ICD-10-CM

## 2019-11-08 DIAGNOSIS — M54.2 CERVICAL MUSCLE PAIN: ICD-10-CM

## 2019-11-08 DIAGNOSIS — M25.511 ACUTE PAIN OF RIGHT SHOULDER: ICD-10-CM

## 2019-11-08 PROCEDURE — 97140 MANUAL THERAPY 1/> REGIONS: CPT | Performed by: PHYSICAL THERAPIST

## 2019-11-08 PROCEDURE — 97110 THERAPEUTIC EXERCISES: CPT | Performed by: PHYSICAL THERAPIST

## 2019-11-08 PROCEDURE — 97112 NEUROMUSCULAR REEDUCATION: CPT | Performed by: PHYSICAL THERAPIST

## 2019-11-08 NOTE — PROGRESS NOTES
Daily Note     Today's date: 2019  Patient name: Blase Felty  : 1980  MRN: [id number]  Referring provider: RUMA Garcia  Dx:   Encounter Diagnosis     ICD-10-CM    1  Traumatic injury of head, subsequent encounter S09  90XD    2  Cervical muscle pain M54 2    3  Acute pain of right shoulder M25 511                   Subjective: Patient reports she felt "really good" over the weekend and notes no cervical pain  Objective: See treatment diary below    Manuals: 20 minutes   TRP UT, LV, paraspinal and suboccipital region BL with SCM    Mobilization with Movement:   R Glenohumeral inferior glide with active abduction      - TB Rows (Green TB) with scap retractions: 15 reps  - Serratus Star (Yellow TB): 5 cycles each  - Bilateral Shoulder ER with towel roll for tactile cue (Yellow TB): 15 reps      C spine stretches 15 min  UT: 30 sec hold 3 reps BL   LV: 30 sec hold 3 reps BL  Rotation: 30 sec hold 3 reps BL   standing door way pec stretch 30 sec hold 2 reps   Chin tucks 3 sec hold 10 reps       Assessment:   Continued VC and TCs for proper form and technique with exercises this date  No pain with over head shoulder movement    She will continue to benefit from skilled outpatient PT in order to maximize her function and decrease her symptoms  Plan: Continue per plan of care  Continue postural stabilization exercises and transfer to Ortho for shoulder      Precautions:  has a past medical history of Hypertension

## 2019-11-15 ENCOUNTER — OFFICE VISIT (OUTPATIENT)
Dept: PHYSICAL THERAPY | Facility: CLINIC | Age: 39
End: 2019-11-15
Payer: COMMERCIAL

## 2019-11-15 DIAGNOSIS — M25.511 ACUTE PAIN OF RIGHT SHOULDER: ICD-10-CM

## 2019-11-15 DIAGNOSIS — M54.2 CERVICAL MUSCLE PAIN: ICD-10-CM

## 2019-11-15 DIAGNOSIS — S09.90XD TRAUMATIC INJURY OF HEAD, SUBSEQUENT ENCOUNTER: Primary | ICD-10-CM

## 2019-11-15 PROCEDURE — 97110 THERAPEUTIC EXERCISES: CPT

## 2019-11-15 PROCEDURE — 97140 MANUAL THERAPY 1/> REGIONS: CPT

## 2019-11-15 NOTE — PROGRESS NOTES
Daily Note     Today's date: 11/15/2019  Patient name: Jemal Bruno  : 1980  MRN: [id number]  Referring provider: RUMA Vasquez  Dx:   Encounter Diagnosis     ICD-10-CM    1  Traumatic injury of head, subsequent encounter S09  90XD    2  Cervical muscle pain M54 2    3  Acute pain of right shoulder M25 511                   Subjective: Patient reports she felt "really good" over the weekend and notes no cervical pain  Objective: See treatment diary below    Manuals: 20 minutes   TRP UT, LV, paraspinal, suboccipital, SCM, and subscapular region BL    Mobilization with Movement:   R Glenohumeral inferior glide with active abduction      - TB Rows (Green TB) with scap retractions: 20 reps  - Serratus Star (Yellow TB): 5 cycles each  - Bilateral Shoulder ER with towel roll for tactile cue (Green TB): 20 reps  - I's and Y's (Red TB) with towel roll for tactile cue: 20 reps      C spine stretches  UT: 30 sec hold 3 reps BL   LV: 30 sec hold 3 reps BL  Rotation: 30 sec hold 3 reps BL   standing door way pec stretch 30 sec hold 2 reps       Assessment: Patient able to tolerate treatment session well with continued progression of postural exercises  She continues to require verbal and tactile cues for postural awareness and to promote proper mechanics throughout exercises  Patient demonstrated improved AROM with diminished pain during shoulder abduction at the end of the session  She will continue to benefit from skilled outpatient PT to maximize her function and decrease her symptoms  Plan: Continue per plan of care  Continue postural stabilization exercises and transfer to Ortho for shoulder  Precautions:  has a past medical history of Hypertension

## 2019-11-19 ENCOUNTER — OFFICE VISIT (OUTPATIENT)
Dept: PHYSICAL THERAPY | Facility: CLINIC | Age: 39
End: 2019-11-19
Payer: COMMERCIAL

## 2019-11-19 DIAGNOSIS — S09.90XD TRAUMATIC INJURY OF HEAD, SUBSEQUENT ENCOUNTER: Primary | ICD-10-CM

## 2019-11-19 DIAGNOSIS — M25.511 ACUTE PAIN OF RIGHT SHOULDER: ICD-10-CM

## 2019-11-19 DIAGNOSIS — M54.2 CERVICAL MUSCLE PAIN: ICD-10-CM

## 2019-11-19 PROCEDURE — 97112 NEUROMUSCULAR REEDUCATION: CPT

## 2019-11-19 PROCEDURE — 97140 MANUAL THERAPY 1/> REGIONS: CPT

## 2019-11-19 PROCEDURE — 97110 THERAPEUTIC EXERCISES: CPT

## 2019-11-19 NOTE — PROGRESS NOTES
Daily Note     Today's date: 2019  Patient name: Micah Graham  : 1980  MRN: [id number]  Referring provider: RUMA Peterson  Dx:   Encounter Diagnosis     ICD-10-CM    1  Traumatic injury of head, subsequent encounter S09  90XD    2  Cervical muscle pain M54 2    3  Acute pain of right shoulder M25 511                   Subjective: Patient reports she continues to feel "pretty good, but I definitely had some pain in my shoulder when I was raking this weekend "    Objective: See treatment diary below    Manuals:   TRP UT, LV, paraspinal, suboccipital, SCM, and subscapular region BL, Latissimus Dorsi    Mobilization with Movement:   R Glenohumeral inferior glide with active abduction      - TB Rows (Green TB) with scap retractions: 20 reps  - TB Shoulder Extensions with scap retractions (Green TB): 20 reps  - Serratus Star (Yellow TB): 5 cycles each  - Bilateral Shoulder ER with towel roll for tactile cue (Green TB): 20 reps  - I's and Y's (Yellow TB) with towel roll for tactile cue: 20 reps  - D2 Flexion (Yellow TB): 10 reps  - Isometric Internal rotation TB Walkouts (Green TB): 20 reps  - Standing Doorway Pec Stretch: 3 reps, 30 sec    HEP (19)  - TB Rows  - TB Shoulder Extensions  - Serratus Star  - Bilateral Shoulder ER  - Cervical Stretches: UT, LV, Deep Neck Flexor, Doorway Pec Stretch      Assessment: Patient was able to tolerate treatment session well today with improved pain and AROM following manual therapy  She continues to demonstrate appropriate body mechanics, however requires tactile cues with towel roll for posture correction  As she fatigues, she displays compensatory UT utilization with postural exercises  She will continue to benefit from skilled outpatient PT to maximize her function and decrease her symptoms  Plan: Continue per plan of care  Continue postural stabilization exercises and transfer to Ortho for shoulder       Precautions:  has a past medical history of Hypertension

## 2019-11-21 ENCOUNTER — OFFICE VISIT (OUTPATIENT)
Dept: PHYSICAL THERAPY | Facility: CLINIC | Age: 39
End: 2019-11-21
Payer: COMMERCIAL

## 2019-11-21 DIAGNOSIS — S09.90XD TRAUMATIC INJURY OF HEAD, SUBSEQUENT ENCOUNTER: Primary | ICD-10-CM

## 2019-11-21 DIAGNOSIS — M25.511 ACUTE PAIN OF RIGHT SHOULDER: ICD-10-CM

## 2019-11-21 DIAGNOSIS — M54.2 CERVICAL MUSCLE PAIN: ICD-10-CM

## 2019-11-21 PROCEDURE — 97530 THERAPEUTIC ACTIVITIES: CPT

## 2019-11-21 PROCEDURE — 97140 MANUAL THERAPY 1/> REGIONS: CPT

## 2019-11-21 NOTE — PROGRESS NOTES
Daily Note     Today's date: 2019  Patient name: Saray Feliciano  : 1980  MRN: [id number]  Referring provider: RUMA Manuel  Dx:   Encounter Diagnosis     ICD-10-CM    1  Traumatic injury of head, subsequent encounter S09  90XD    2  Cervical muscle pain M54 2    3  Acute pain of right shoulder M25 511                   Subjective: Patient continues to report minimal pain with shoulder abduction, however "there are certain movements when I get a twinge and my arm was really tired after last session and felt heavy to move "    Objective: See treatment diary below    Manuals:   TRP Latissimus Dorsi  Scapular adduction/abduction mobs    Mobilization with Movement:   R Glenohumeral inferior glide with passive abduction    Empty Can Test: (-) Bilateral  Jinx Niecy: (-) on L, (+) on R  (+) for pain with isometric IR on R  Yergason's Test: (-) bilateral  Crank Test: (+) for pain without clunk          Not Performed:   - TB Rows (Green TB) with scap retractions: 20 reps  - TB Shoulder Extensions with scap retractions (Green TB): 20 reps  - Serratus Star (Yellow TB): 5 cycles each  - Bilateral Shoulder ER with towel roll for tactile cue (Green TB): 20 reps  - I's and Y's (Yellow TB) with towel roll for tactile cue: 20 reps  - D2 Flexion (Yellow TB): 10 reps  - Isometric Internal rotation TB Walkouts (Green TB): 20 reps  - Standing Doorway Pec Stretch: 3 reps, 30 sec    HEP Updated (19)  - TB Rows  - TB Shoulder Extensions  - Bilateral Shoulder ER  - Cervical Stretches: UT, LV, Deep Neck Flexor, Doorway Pec Stretch      Assessment: Patient with continued improvements noted with shoulder abduction AROM and decrease in pain following passive mobilizations of R GH joint and scapula  Patient was (+) for pain with multiple rotator cuff special tests and PT discussed getting evaluated with an orthopedic PT and she was in good verbal understanding and agreement   She will benefit from skilled outpatient PT to maximize her function and reduce her symptoms  Plan: Continue per plan of care  Re-Evaluate next session and transfer to ortho  Precautions:  has a past medical history of Hypertension

## 2019-11-26 ENCOUNTER — OFFICE VISIT (OUTPATIENT)
Dept: PHYSICAL THERAPY | Facility: CLINIC | Age: 39
End: 2019-11-26
Payer: COMMERCIAL

## 2019-11-26 DIAGNOSIS — S09.90XD TRAUMATIC INJURY OF HEAD, SUBSEQUENT ENCOUNTER: Primary | ICD-10-CM

## 2019-11-26 DIAGNOSIS — M54.2 CERVICAL MUSCLE PAIN: ICD-10-CM

## 2019-11-26 DIAGNOSIS — M25.511 ACUTE PAIN OF RIGHT SHOULDER: ICD-10-CM

## 2019-11-26 PROCEDURE — 97164 PT RE-EVAL EST PLAN CARE: CPT

## 2019-11-26 PROCEDURE — 97110 THERAPEUTIC EXERCISES: CPT

## 2019-11-26 PROCEDURE — 97140 MANUAL THERAPY 1/> REGIONS: CPT
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
## 2019-12-02 ENCOUNTER — APPOINTMENT (OUTPATIENT)
Dept: PHYSICAL THERAPY | Facility: CLINIC | Age: 39
End: 2019-12-02
Payer: COMMERCIAL

## 2019-12-04 ENCOUNTER — OFFICE VISIT (OUTPATIENT)
Dept: PHYSICAL THERAPY | Facility: CLINIC | Age: 39
End: 2019-12-04
Payer: COMMERCIAL

## 2019-12-04 DIAGNOSIS — S09.90XD TRAUMATIC INJURY OF HEAD, SUBSEQUENT ENCOUNTER: Primary | ICD-10-CM

## 2019-12-04 DIAGNOSIS — M54.2 CERVICAL MUSCLE PAIN: ICD-10-CM

## 2019-12-04 DIAGNOSIS — M25.511 ACUTE PAIN OF RIGHT SHOULDER: ICD-10-CM

## 2019-12-04 PROCEDURE — 97530 THERAPEUTIC ACTIVITIES: CPT

## 2019-12-04 PROCEDURE — 97110 THERAPEUTIC EXERCISES: CPT

## 2019-12-04 NOTE — PROGRESS NOTES
Daily Note     Today's date: 2019  Patient name: Hilario Laureano  : 1980  MRN: [id number]  Referring provider: RUMA Huang  Dx:   Encounter Diagnosis     ICD-10-CM    1  Traumatic injury of head, subsequent encounter S09  90XD    2  Cervical muscle pain M54 2    3  Acute pain of right shoulder M25 511                   Subjective: Patient reports no pain in her neck or shoulder since the beginning of last week  She notes she went skiing "for the first time in years" over the weekend and had no shoulder pain  Objective: See treatment diary below    Active Shoulder ROM:  - Flexion: WFL Bilaterally  - Abduction: WFL Bilaterally  - Extension: WFL Bilaterally  - IR: WFL Bilaterally  - ER: Moderately limited on R as compared to L    Mobilization with Movement:   R Glenohumeral inferior glide with passive abduction      - TB Rows (Blue TB) with scap retractions: 20 reps  - TB Shoulder Extensions with scap retractions (Blue TB): 20 reps  - Serratus Star (Yellow TB): 5 cycles each  - Bilateral Shoulder ER (Blue TB): 20 reps  - I's and Y's (Green TB): 20 reps      HEP Updated (19)  - TB Rows  - TB Shoulder Extensions  - Bilateral Shoulder ER  - Cervical Stretches: UT, LV, Deep Neck Flexor, Doorway Pec Stretch      Assessment: Patient able to tolerate treatment session well today with no pain during any exercise  She demonstrated appropriate body mechanics without UT compensation with ability to self-correct and no longer required PT verbal cues  She displayed improved shoulder and cervical AROM in all directions and is no longer limited by pain  She demonstrated proficiency with HEP and has returned to PLOF  Patient to be discharged today due to reduction in neck pain to 0/10 and improvements noted in shoulder and cervical AROM  Plan: Continue per plan of care  Discharge from skilled outpatient PT  Precautions:  has a past medical history of Hypertension

## 2020-01-06 DIAGNOSIS — G25.81 RLS (RESTLESS LEGS SYNDROME): ICD-10-CM

## 2020-01-06 DIAGNOSIS — I10 HYPERTENSION, UNSPECIFIED TYPE: ICD-10-CM

## 2020-01-06 RX ORDER — ROPINIROLE 1 MG/1
1 TABLET, FILM COATED ORAL
Qty: 90 TABLET | Refills: 0 | Status: SHIPPED | OUTPATIENT
Start: 2020-01-06 | End: 2020-03-12 | Stop reason: SDUPTHER

## 2020-01-06 RX ORDER — HYDROCHLOROTHIAZIDE 25 MG/1
25 TABLET ORAL DAILY
Qty: 90 TABLET | Refills: 3 | Status: SHIPPED | OUTPATIENT
Start: 2020-01-06 | End: 2020-04-07 | Stop reason: SDUPTHER

## 2020-02-24 ENCOUNTER — OFFICE VISIT (OUTPATIENT)
Dept: NEUROLOGY | Facility: CLINIC | Age: 40
End: 2020-02-24
Payer: COMMERCIAL

## 2020-02-24 VITALS
HEART RATE: 73 BPM | SYSTOLIC BLOOD PRESSURE: 115 MMHG | WEIGHT: 143 LBS | BODY MASS INDEX: 28.07 KG/M2 | DIASTOLIC BLOOD PRESSURE: 78 MMHG | HEIGHT: 60 IN

## 2020-02-24 DIAGNOSIS — G25.81 RLS (RESTLESS LEGS SYNDROME): ICD-10-CM

## 2020-02-24 DIAGNOSIS — R90.82 WHITE MATTER ABNORMALITY ON MRI OF BRAIN: ICD-10-CM

## 2020-02-24 DIAGNOSIS — G25.81 RESTLESS LEGS SYNDROME (RLS): Primary | ICD-10-CM

## 2020-02-24 PROCEDURE — 3078F DIAST BP <80 MM HG: CPT | Performed by: PSYCHIATRY & NEUROLOGY

## 2020-02-24 PROCEDURE — 3074F SYST BP LT 130 MM HG: CPT | Performed by: PSYCHIATRY & NEUROLOGY

## 2020-02-24 PROCEDURE — 1036F TOBACCO NON-USER: CPT | Performed by: PSYCHIATRY & NEUROLOGY

## 2020-02-24 PROCEDURE — 99204 OFFICE O/P NEW MOD 45 MIN: CPT | Performed by: PSYCHIATRY & NEUROLOGY

## 2020-02-24 PROCEDURE — 3008F BODY MASS INDEX DOCD: CPT | Performed by: PSYCHIATRY & NEUROLOGY

## 2020-02-24 RX ORDER — VITAMIN E 268 MG
400 CAPSULE ORAL DAILY
COMMUNITY
End: 2021-10-05

## 2020-02-24 NOTE — PROGRESS NOTES
Outpatient Neurology History and Physical  Vinny Snyder  [id number]  42 y o   1980          Consult: Yes    RUMA Mathews      Chief Complaint   Patient presents with    Restless Leg           History Obtained from: patient     HPI:     Ms Adriana Pond is a 43 yo F that presents to establish care for RLS  Her symptoms started about 10 years ago  If she's sitting for prolonged period of time, she feels tingling in lower back  If she gets up, it can stop  She rocks in bed to lessen the symptoms  Her symptoms are more prominent during evening and night hours  They can wake her up  As soon as she stands up , she's fine  Activity can worsen her symptoms  She denies associated back pain  Her most recent cbc was wnl  There is no h/o iron def anemia  She is more active now and has lost 15 lbs in past year  She had CT cervical spine in oct of 2019 and it was normal    She had mri brain in march of 2017 which showed 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  Repeat study is pending  Patient reports decreased strength in both hands over time  She can't open jars  Denies any radiation of pain, paresthesia from neck  Denies associated wrist pain  She has been tested twice for Lyme and it's essentially normal  Her ELLIOT, RF have been normal      She takes vit E for breast tenderness and cramps       Past Medical History:   Diagnosis Date    Hot flashes     Hypertension     Restless leg syndrome                Current Outpatient Medications on File Prior to Visit   Medication Sig Dispense Refill    Cholecalciferol (VITAMIN D3) 1000 units CAPS Take 2 capsules by mouth daily      hydrochlorothiazide (HYDRODIURIL) 25 mg tablet Take 1 tablet (25 mg total) by mouth daily 90 tablet 3    loratadine (CLARITIN) 10 mg tablet Take 1 tablet by mouth daily      rOPINIRole (REQUIP) 1 mg tablet Take 1 tablet (1 mg total) by mouth daily at bedtime 90 tablet 0    vitamin E, tocopherol, 400 units capsule Take 400 Units by mouth daily      baclofen 10 mg tablet Take 1 tablet (10 mg total) by mouth 3 (three) times a day for 10 days 30 tablet 0    ibuprofen (MOTRIN) 800 mg tablet Take 1 tablet (800 mg total) by mouth 3 (three) times a day for 15 days 45 tablet 0    mupirocin (BACTROBAN) 2 % ointment Apply topically 3 (three) times a day (Patient not taking: Reported on 2/24/2020) 22 g 0    [DISCONTINUED] vitamin E, tocopherol, (CVS VITAMIN E) 1,000 units capsule Take 1 capsule by mouth daily       No current facility-administered medications on file prior to visit  Allergies   Allergen Reactions    Amoxicillin Edema     Category: Allergy;           Family History   Problem Relation Age of Onset    Hypertension Mother     Thyroid disease Mother     Uterine cancer Mother     Hypertension Father     Diabetes Maternal Grandmother     Alzheimer's disease Maternal Grandmother     Alzheimer's disease Maternal Grandfather     No Known Problems Daughter                 Past Surgical History:   Procedure Laterality Date    EYE SURGERY      WISDOM TOOTH EXTRACTION Bilateral            Social History     Socioeconomic History    Marital status: Single     Spouse name: Not on file    Number of children: Not on file    Years of education: Not on file    Highest education level: Not on file   Occupational History    Not on file   Social Needs    Financial resource strain: Not on file    Food insecurity:     Worry: Not on file     Inability: Not on file    Transportation needs:     Medical: Not on file     Non-medical: Not on file   Tobacco Use    Smoking status: Never Smoker    Smokeless tobacco: Never Used   Substance and Sexual Activity    Alcohol use: Yes     Comment: social    Drug use: No    Sexual activity: Yes     Partners: Female   Lifestyle    Physical activity:     Days per week: Not on file     Minutes per session: Not on file    Stress: Not on file   Relationships    Social connections:     Talks on phone: Not on file     Gets together: Not on file     Attends Islam service: Not on file     Active member of club or organization: Not on file     Attends meetings of clubs or organizations: Not on file     Relationship status: Not on file    Intimate partner violence:     Fear of current or ex partner: Not on file     Emotionally abused: Not on file     Physically abused: Not on file     Forced sexual activity: Not on file   Other Topics Concern    Not on file   Social History Narrative    Not on file       Review of Systems  Refer to positive review of systems in HPI  Constitutional- No fever  Eyes- No visual change  ENT- Hearing normal  CV- No chest pain  Resp- No Shortness of breath  GI- No diarrhea  - Bladder normal  MS- No Arthritis   Skin- No rash  Psych- No depression  Endo- No DM  Heme- No nodes    PHYSICAL EXAM:    Vitals:    02/24/20 1525   BP: 115/78   BP Location: Left arm   Patient Position: Sitting   Cuff Size: Standard   Pulse: 73   Weight: 64 9 kg (143 lb)   Height: 5' (1 524 m)         Appearance: No Acute Distress  Ophthalmoscopic: Disc Flat, Normal fundus  Carotid/Heart/Peripheral Vascular: No Bruits, RRR  Orientation: Awake, Alert, and Oriented x 3  Mental status:  Memory: Registation 3/3 Recall 3/3  Attention: Normal  Knowledge: Appropriate  Language: No aphasia  Speech: No dysarthria  Cranial Nerves:  2 No Visual Defect on Confrontation; Pupils round, equal, reactive to light  3,4,6 Extraocular Movements Intact; no nystagmus  5 Facial Sensation Intact  7 No facial asymmetry  8 Intact hearing  9,10 Palate symmetric, normal gag  11 Good shoulder shrug  12 Tongue Midline  Gait: Stable, No ataxia, can perform tandem walking  Coordination: No ataxia with finger to nose testing and heel to shin testing  Sensory: Intact, Symmetric to Pinprick, Light Touch, Vibration, and Joint Position  Muscle Tone: Normal  Muscle exam  Arm Right Left Leg Right Left   Deltoid 5/5 5/5 Iliopsoas 5/5 5/5   Biceps 5/5 5/5 Quads 5/5 5/5   Triceps 5/5 5/5 Hamstrings 5/5 5/5   Wrist Extension 5/5 5/5 Ankle Dorsi Flexion 5/5 5/5   Wrist Flexion 5/5 5/5 Ankle Plantar Flexion 5/5 5/5   Interossei 5/5 5/5 Ankle Eversion 5/5 5/5   APB 5/5 5/5 Ankle Inversion 5/5 5/5       Reflexes   RJ BJ TJ KJ AJ Plantars Starks's   Right 2+ 2+ 2+ 2+ 2+ Downgoing Not present   Left 2+ 2+ 2+ 2+ 2+ Downgoing Not present         Personal review of  Ct cervical spine: wnl            Assessment/Plan:     1  Restless legs syndrome (RLS)     2  White matter abnormality on MRI of brain  MRI brain MS wo and w contrast   3  RLS (restless legs syndrome)         To better manage RLS, will increase requip to 1mg in evening and 1mg at 9pm   Will repeat MRI brain as f/u from 2017 to make sure there is no new concern for MS  Her migraines are only related sleep deprivation so doesn't need therapy at present  Counseling Documentation:  The patient and/or patient's family were  counseled regarding diagnostic results  Instructions for management,risk factor reductions,prognosis of disease were discussed  Patient and family were educated regarding impressions,risks and benefits of treatment options,importance of compliance with treatment  Total time of encounter: 45 min  More than 50% of time was spent in counseling and coordination of care of patient  EMELIA Otto [facility]  [address]  [address]

## 2020-02-26 ENCOUNTER — HOSPITAL ENCOUNTER (EMERGENCY)
Facility: HOSPITAL | Age: 40
Discharge: HOME | End: 2020-02-26
Attending: EMERGENCY MEDICINE | Admitting: EMERGENCY MEDICINE
Payer: OTHER MISCELLANEOUS

## 2020-02-26 VITALS
OXYGEN SATURATION: 98 % | SYSTOLIC BLOOD PRESSURE: 128 MMHG | HEART RATE: 76 BPM | DIASTOLIC BLOOD PRESSURE: 89 MMHG | RESPIRATION RATE: 16 BRPM | BODY MASS INDEX: 27.73 KG/M2 | TEMPERATURE: 97.7 F | WEIGHT: 142 LBS

## 2020-02-26 DIAGNOSIS — S39.012A STRAIN OF LUMBAR REGION, INITIAL ENCOUNTER: Primary | ICD-10-CM

## 2020-02-26 DIAGNOSIS — M62.830 MUSCLE SPASM OF BACK: ICD-10-CM

## 2020-02-26 PROCEDURE — 96372 THER/PROPH/DIAG INJ SC/IM: CPT

## 2020-02-26 PROCEDURE — 99283 EMERGENCY DEPT VISIT LOW MDM: CPT

## 2020-02-26 PROCEDURE — 99284 EMERGENCY DEPT VISIT MOD MDM: CPT | Performed by: PHYSICIAN ASSISTANT

## 2020-02-26 RX ORDER — METHOCARBAMOL 500 MG/1
500 TABLET, FILM COATED ORAL 2 TIMES DAILY
Qty: 10 TABLET | Refills: 0 | Status: SHIPPED | OUTPATIENT
Start: 2020-02-26 | End: 2020-07-15 | Stop reason: ALTCHOICE

## 2020-02-26 RX ORDER — NAPROXEN 500 MG/1
500 TABLET ORAL 2 TIMES DAILY WITH MEALS
Qty: 14 TABLET | Refills: 0 | Status: SHIPPED | OUTPATIENT
Start: 2020-02-26 | End: 2020-07-15 | Stop reason: ALTCHOICE

## 2020-02-26 RX ORDER — KETOROLAC TROMETHAMINE 30 MG/ML
15 INJECTION, SOLUTION INTRAMUSCULAR; INTRAVENOUS ONCE
Status: COMPLETED | OUTPATIENT
Start: 2020-02-26 | End: 2020-02-26

## 2020-02-26 RX ADMIN — KETOROLAC TROMETHAMINE 15 MG: 30 INJECTION, SOLUTION INTRAMUSCULAR at 14:21

## 2020-02-26 NOTE — ED PROVIDER NOTES
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 Taking?    Cholecalciferol (VITAMIN D3) 1000 units CAPS  Self Yes No   Sig: Take 2 capsules by mouth daily   hydrochlorothiazide (HYDRODIURIL) 25 mg tablet   No No   Sig: Take 1 tablet (25 mg total) by mouth daily   loratadine (CLARITIN) 10 mg tablet  Self Yes No   Sig: Take 1 tablet by mouth daily   mupirocin (BACTROBAN) 2 % ointment   No No   Sig: Apply topically 3 (three) times a day   Patient not taking: Reported on 2/24/2020   rOPINIRole (REQUIP) 1 mg tablet   No No   Sig: Take 1 tablet (1 mg total) by mouth daily at bedtime   Patient taking differently: Take 1 mg by mouth 2 (two) times a day    vitamin E, tocopherol, 400 units capsule  Self Yes No   Sig: Take 400 Units by mouth daily      Facility-Administered Medications: None       Past Medical History:   Diagnosis Date    Hot flashes     Hypertension     Restless leg syndrome        Past Surgical History:   Procedure Laterality Date    EYE SURGERY      WISDOM TOOTH EXTRACTION Bilateral        Family History   Problem Relation Age of Onset    Hypertension Mother     Thyroid disease Mother     Uterine cancer Mother     Hypertension Father     Diabetes Maternal Grandmother     Alzheimer's disease Maternal Grandmother     Alzheimer's disease Maternal Grandfather     No Known Problems Daughter      I have reviewed and agree with the history as documented  E-Cigarette/Vaping    E-Cigarette Use Never User      E-Cigarette/Vaping Substances     Social History     Tobacco Use    Smoking status: Never Smoker    Smokeless tobacco: Never Used   Substance Use Topics    Alcohol use: Yes     Comment: social    Drug use: No       Review of Systems   Constitutional: Negative  Negative for chills, fever and unexpected weight change  Denies IV drug use     HENT: Negative  Eyes: Negative  Respiratory: Negative  Negative for cough, chest tightness, shortness of breath and wheezing  Cardiovascular: Negative  Negative for chest pain and palpitations  Gastrointestinal: Negative  Negative for abdominal pain, constipation, diarrhea, nausea and vomiting  Genitourinary: Negative  Negative for difficulty urinating, dysuria, flank pain, frequency, hematuria and urgency  Denies numbness, tingling in the groin  Musculoskeletal: Positive for back pain  Negative for arthralgias, gait problem, joint swelling, myalgias, neck pain and neck stiffness  Skin: Negative  Negative for color change  Neurological: Negative  Negative for dizziness, tremors, weakness, light-headedness, numbness and headaches  All other systems reviewed and are negative  Physical Exam  Physical Exam   Constitutional: She is oriented to person, place, and time  She appears well-developed and well-nourished  HENT:   Head: Normocephalic and atraumatic  Nose: Nose normal    Eyes: Conjunctivae are normal    Cardiovascular: Normal rate, regular rhythm, normal heart sounds and intact distal pulses  Exam reveals no gallop and no friction rub  No murmur heard  Pulmonary/Chest: Effort normal and breath sounds normal  No stridor  No respiratory distress  She has no wheezes  She has no rales  She exhibits no tenderness     S PO2 is 98% indicating adequate oxygenation   Abdominal: Soft  Bowel sounds are normal  She exhibits no distension and no mass  There is no tenderness  There is no rebound and no guarding  No hernia  Musculoskeletal:        Arms:  Neurological: She is alert and oriented to person, place, and time  Skin: Skin is warm and dry  Capillary refill takes less than 2 seconds  Nursing note and vitals reviewed  Vital Signs  ED Triage Vitals [02/26/20 1348]   Temperature Pulse Respirations Blood Pressure SpO2   97 7 °F (36 5 °C) 76 16 128/89 98 %      Temp Source Heart Rate Source Patient Position - Orthostatic VS BP Location FiO2 (%)   Tympanic Monitor Sitting Left arm --      Pain Score       5           Vitals:    02/26/20 1348   BP: 128/89   Pulse: 76   Patient Position - Orthostatic VS: Sitting         Visual Acuity      ED Medications  Medications   ketorolac (TORADOL) injection 15 mg (has no administration in time range)       Diagnostic Studies  Results Reviewed     None                 No orders to display              Procedures  Procedures         ED Course                               MDM  Number of Diagnoses or Management Options  Diagnosis management comments: Will treat for muscle strain and muscle spasm  Informed not to drive or operate heavy machinery while taking muscle relaxants  Patient would like to go back to work today  Patient was given thorough education regarding lifting precautions  Patient is informed to return to the emergency department for worsening of symptoms and was given proper education regarding their diagnosis and symptoms  Otherwise the patient is informed to follow up with their primary care doctor for re-evaluation  The patient verbalizes understanding and agrees with above assessment and plan  All questions were answered  Please Note: Fluency Direct voice recognition software may have been used in the creation of this document   Wrong words or sound a like substitutions may have occurred due to the inherent limitations of the voice software  Amount and/or Complexity of Data Reviewed  Review and summarize past medical records: yes  Independent visualization of images, tracings, or specimens: yes          Disposition  Final diagnoses:   Strain of lumbar region, initial encounter   Muscle spasm of back     Time reflects when diagnosis was documented in both MDM as applicable and the Disposition within this note     Time User Action Codes Description Comment    2/26/2020  2:12 PM Norberto, [facility] [facility] Strain of lumbar region, initial encounter     2/26/2020  2:12 PM Rolan Palacio Add [[id number]] Muscle spasm of back       ED Disposition     ED Disposition Condition Date/Time Comment    Discharge Stable Wed Feb 26, 2020  2:12 PM Harry Lagos discharge to home/self care  Follow-up Information     Follow up With Specialties Details Why Contact Info Additional Information    [address] [facility] Emergency Department Emergency Medicine Go to  If symptoms worsen such as severe pain, bladder or bowel retention or incontinence, numbness or tingling in the groin    [address] [facility], [place], [place], [address] [place] [address], [address] [place] [address] Nurse Practitioner Schedule an appointment as soon as possible for a visit  As needed, if symptoms persist  [address] [facility]  [address]  [address]  [phone]             Patient's Medications   Discharge Prescriptions    METHOCARBAMOL (ROBAXIN) 500 MG TABLET    Take 1 tablet (500 mg total) by mouth 2 (two) times a day for 5 days       Start Date: 2/26/2020 End Date: 3/2/2020       Order Dose: 500 mg       Quantity: 10 tablet    Refills: 0    NAPROXEN (NAPROSYN) 500 MG TABLET    Take 1 tablet (500 mg total) by mouth 2 (two) times a day with meals for 7 days       Start Date: 2/26/2020 End Date: 3/4/2020       Order Dose: 500 mg       Quantity: 14 tablet    Refills: 0     No discharge procedures on file      PDMP Review       Value Time User    PDMP Reviewed  Yes 10/18/2019  7:37 AM Ree Notice, [address]          ED Provider  Electronically Signed by           Jodee Beebe PA-C  02/26/20 8173

## 2020-03-09 ENCOUNTER — HOSPITAL ENCOUNTER (OUTPATIENT)
Dept: RADIOLOGY | Age: 40
Discharge: HOME | End: 2020-03-09
Payer: COMMERCIAL

## 2020-03-09 DIAGNOSIS — R90.82 WHITE MATTER ABNORMALITY ON MRI OF BRAIN: ICD-10-CM

## 2020-03-09 PROCEDURE — 70553 MRI BRAIN STEM W/O & W/DYE: CPT

## 2020-03-09 PROCEDURE — A9585 GADOBUTROL INJECTION: HCPCS | Performed by: PSYCHIATRY & NEUROLOGY

## 2020-03-09 RX ADMIN — GADOBUTROL 6 ML: 604.72 INJECTION INTRAVENOUS at 15:03

## 2020-03-12 DIAGNOSIS — G25.81 RLS (RESTLESS LEGS SYNDROME): ICD-10-CM

## 2020-03-12 RX ORDER — ROPINIROLE 1 MG/1
1 TABLET, FILM COATED ORAL 2 TIMES DAILY
Qty: 180 TABLET | Refills: 0 | Status: SHIPPED | OUTPATIENT
Start: 2020-03-12 | End: 2020-06-12 | Stop reason: SDUPTHER

## 2020-04-07 DIAGNOSIS — I10 HYPERTENSION, UNSPECIFIED TYPE: ICD-10-CM

## 2020-04-07 RX ORDER — HYDROCHLOROTHIAZIDE 25 MG/1
25 TABLET ORAL DAILY
Qty: 90 TABLET | Refills: 0 | Status: SHIPPED | OUTPATIENT
Start: 2020-04-07 | End: 2020-07-22 | Stop reason: SDUPTHER

## 2020-04-14 ENCOUNTER — TELEPHONE (OUTPATIENT)
Dept: FAMILY MEDICINE CLINIC | Facility: CLINIC | Age: 40
End: 2020-04-14

## 2020-04-15 DIAGNOSIS — Z91.030 BEE STING ALLERGY: Primary | ICD-10-CM

## 2020-04-15 RX ORDER — EPINEPHRINE 0.3 MG/.3ML
0.3 INJECTION SUBCUTANEOUS ONCE
Qty: 0.6 ML | Refills: 0 | Status: SHIPPED | OUTPATIENT
Start: 2020-04-15 | End: 2021-06-03 | Stop reason: SDUPTHER

## 2020-06-12 DIAGNOSIS — G25.81 RLS (RESTLESS LEGS SYNDROME): ICD-10-CM

## 2020-06-15 RX ORDER — ROPINIROLE 1 MG/1
1 TABLET, FILM COATED ORAL 2 TIMES DAILY
Qty: 180 TABLET | Refills: 0 | Status: SHIPPED | OUTPATIENT
Start: 2020-06-15 | End: 2020-06-16 | Stop reason: SDUPTHER

## 2020-06-16 DIAGNOSIS — G25.81 RLS (RESTLESS LEGS SYNDROME): ICD-10-CM

## 2020-06-16 RX ORDER — ROPINIROLE 1 MG/1
1 TABLET, FILM COATED ORAL 2 TIMES DAILY
Qty: 14 TABLET | Refills: 0 | Status: SHIPPED | OUTPATIENT
Start: 2020-06-16 | End: 2020-09-03 | Stop reason: SDUPTHER

## 2020-07-15 ENCOUNTER — OFFICE VISIT (OUTPATIENT)
Dept: NEUROLOGY | Facility: CLINIC | Age: 40
End: 2020-07-15
Payer: COMMERCIAL

## 2020-07-15 VITALS
TEMPERATURE: 97.7 F | BODY MASS INDEX: 26.19 KG/M2 | HEIGHT: 60 IN | HEART RATE: 74 BPM | DIASTOLIC BLOOD PRESSURE: 85 MMHG | SYSTOLIC BLOOD PRESSURE: 122 MMHG | WEIGHT: 133.4 LBS

## 2020-07-15 DIAGNOSIS — G25.81 RESTLESS LEGS SYNDROME (RLS): Primary | ICD-10-CM

## 2020-07-15 DIAGNOSIS — G89.29 CHRONIC BILATERAL LOW BACK PAIN WITH SCIATICA, SCIATICA LATERALITY UNSPECIFIED: ICD-10-CM

## 2020-07-15 DIAGNOSIS — Z29.9 PREVENTIVE MEASURE: ICD-10-CM

## 2020-07-15 DIAGNOSIS — R29.2: ICD-10-CM

## 2020-07-15 DIAGNOSIS — M54.40 CHRONIC BILATERAL LOW BACK PAIN WITH SCIATICA, SCIATICA LATERALITY UNSPECIFIED: ICD-10-CM

## 2020-07-15 PROCEDURE — 3008F BODY MASS INDEX DOCD: CPT | Performed by: PSYCHIATRY & NEUROLOGY

## 2020-07-15 PROCEDURE — 99215 OFFICE O/P EST HI 40 MIN: CPT | Performed by: PSYCHIATRY & NEUROLOGY

## 2020-07-15 PROCEDURE — 3079F DIAST BP 80-89 MM HG: CPT | Performed by: PSYCHIATRY & NEUROLOGY

## 2020-07-15 PROCEDURE — 1036F TOBACCO NON-USER: CPT | Performed by: PSYCHIATRY & NEUROLOGY

## 2020-07-15 PROCEDURE — 3074F SYST BP LT 130 MM HG: CPT | Performed by: PSYCHIATRY & NEUROLOGY
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
## 2020-07-22 DIAGNOSIS — I10 HYPERTENSION, UNSPECIFIED TYPE: ICD-10-CM

## 2020-07-23 RX ORDER — HYDROCHLOROTHIAZIDE 25 MG/1
25 TABLET ORAL DAILY
Qty: 90 TABLET | Refills: 0 | Status: SHIPPED | OUTPATIENT
Start: 2020-07-23 | End: 2020-11-06 | Stop reason: SDUPTHER

## 2020-08-12 ENCOUNTER — HOSPITAL ENCOUNTER (OUTPATIENT)
Dept: RADIOLOGY | Facility: HOSPITAL | Age: 40
Discharge: HOME | End: 2020-08-12
Attending: PSYCHIATRY & NEUROLOGY
Payer: COMMERCIAL

## 2020-08-12 DIAGNOSIS — M54.40 CHRONIC BILATERAL LOW BACK PAIN WITH SCIATICA, SCIATICA LATERALITY UNSPECIFIED: ICD-10-CM

## 2020-08-12 DIAGNOSIS — G89.29 CHRONIC BILATERAL LOW BACK PAIN WITH SCIATICA, SCIATICA LATERALITY UNSPECIFIED: ICD-10-CM

## 2020-08-12 PROCEDURE — 72148 MRI LUMBAR SPINE W/O DYE: CPT

## 2020-09-03 DIAGNOSIS — G25.81 RLS (RESTLESS LEGS SYNDROME): ICD-10-CM

## 2020-09-03 RX ORDER — ROPINIROLE 1 MG/1
TABLET, FILM COATED ORAL
Qty: 180 TABLET | Refills: 1 | Status: SHIPPED | OUTPATIENT
Start: 2020-09-03 | End: 2020-12-03 | Stop reason: SDUPTHER

## 2020-09-21 ENCOUNTER — OFFICE VISIT (OUTPATIENT)
Dept: FAMILY MEDICINE CLINIC | Facility: CLINIC | Age: 40
End: 2020-09-21
Payer: COMMERCIAL

## 2020-09-21 VITALS
HEART RATE: 77 BPM | WEIGHT: 132 LBS | DIASTOLIC BLOOD PRESSURE: 78 MMHG | SYSTOLIC BLOOD PRESSURE: 122 MMHG | BODY MASS INDEX: 25.91 KG/M2 | TEMPERATURE: 97.3 F | HEIGHT: 60 IN | OXYGEN SATURATION: 97 %

## 2020-09-21 DIAGNOSIS — Z01.818 PRE-OP EXAMINATION: ICD-10-CM

## 2020-09-21 DIAGNOSIS — H26.9 CATARACT OF LEFT EYE, UNSPECIFIED CATARACT TYPE: Primary | ICD-10-CM

## 2020-09-21 PROBLEM — M79.672 PAIN IN BOTH FEET: Status: RESOLVED | Noted: 2018-06-25 | Resolved: 2020-09-21

## 2020-09-21 PROBLEM — M25.511 ACUTE PAIN OF RIGHT SHOULDER: Status: RESOLVED | Noted: 2019-10-18 | Resolved: 2020-09-21

## 2020-09-21 PROBLEM — E66.9 OBESITY (BMI 30-39.9): Status: RESOLVED | Noted: 2019-03-20 | Resolved: 2020-09-21
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
## 2020-09-21 PROBLEM — R23.2 HOT FLASHES: Status: RESOLVED | Noted: 2019-03-20 | Resolved: 2020-09-21

## 2020-09-21 PROBLEM — M72.2 PLANTAR FASCIITIS: Status: RESOLVED | Noted: 2018-06-25 | Resolved: 2020-09-21

## 2020-09-21 PROBLEM — L98.9 SKIN LESION: Status: RESOLVED | Noted: 2019-03-20 | Resolved: 2020-09-21

## 2020-09-21 PROBLEM — T07.XXXA ABRASIONS OF MULTIPLE SITES: Status: RESOLVED | Noted: 2019-10-13 | Resolved: 2020-09-21

## 2020-09-21 PROBLEM — S09.90XA HEAD INJURY DUE TO TRAUMA: Status: RESOLVED | Noted: 2019-10-13 | Resolved: 2020-09-21

## 2020-09-21 PROBLEM — M79.671 PAIN IN BOTH FEET: Status: RESOLVED | Noted: 2018-06-25 | Resolved: 2020-09-21

## 2020-09-21 PROBLEM — M54.2 CERVICAL MUSCLE PAIN: Status: RESOLVED | Noted: 2019-10-18 | Resolved: 2020-09-21

## 2020-09-21 PROBLEM — T14.8XXA MUSCLE STRAIN: Status: RESOLVED | Noted: 2019-10-13 | Resolved: 2020-09-21

## 2020-09-21 PROBLEM — F41.9 ANXIETY: Status: RESOLVED | Noted: 2019-03-20 | Resolved: 2020-09-21

## 2020-09-21 PROBLEM — F41.1 GAD (GENERALIZED ANXIETY DISORDER): Status: RESOLVED | Noted: 2019-03-20 | Resolved: 2020-09-21

## 2020-09-21 PROBLEM — Z01.419 ENCOUNTER FOR GYNECOLOGICAL EXAMINATION (GENERAL) (ROUTINE) WITHOUT ABNORMAL FINDINGS: Status: RESOLVED | Noted: 2018-11-19 | Resolved: 2020-09-21

## 2020-09-21 PROCEDURE — 99214 OFFICE O/P EST MOD 30 MIN: CPT | Performed by: FAMILY MEDICINE

## 2020-09-21 RX ORDER — PREDNISOLONE ACETATE 10 MG/ML
SUSPENSION/ DROPS OPHTHALMIC
COMMUNITY
Start: 2020-09-21 | End: 2021-03-17 | Stop reason: ALTCHOICE

## 2020-09-21 RX ORDER — KETOROLAC TROMETHAMINE 5 MG/ML
SOLUTION OPHTHALMIC
COMMUNITY
Start: 2020-09-21 | End: 2021-03-17 | Stop reason: ALTCHOICE

## 2020-09-21 NOTE — PROGRESS NOTES
Merritt Hull 1980 female MRN: [id number]        ASSESSMENT/PLAN  Problem List Items Addressed This Visit        Other    Cataract of left eye - Primary    Relevant Medications    ketorolac (ACULAR) 0 5 % ophthalmic solution    prednisoLONE acetate (PRED FORTE) 1 % ophthalmic suspension      Other Visit Diagnoses     Pre-op examination              High Risk Surgery: no  CAD: no  CHF: no  CVD: no  DM2 on insulin: no  Cr>2: no        Merritt Hull is undergoing a Minimal risk surgery  She is at [address] for major adverse cardiac event (MACE)  She may proceed with surgery as planned without further workup  SUBJECTIVE  CC: Pre-op Exam (Cataract Surgery Left Eye [facility] with Dedrick Hendrix 9/24/2020 )      HPI:  Merritt Hull is a 47 y o  female with cataract who is planning to undergo L cataract surgery under anesthesia by Dr Dedrick Hendrix on 9/24/2020  Patient has not had complications with anesthesia in the past   Functional status: good    Review of Systems   Constitutional: Negative for activity change, appetite change, chills, fatigue, fever and unexpected weight change  HENT: Negative for congestion, ear discharge, ear pain, postnasal drip, sinus pressure and sore throat  Eyes: Positive for visual disturbance  Negative for discharge  Respiratory: Negative for cough, shortness of breath and wheezing  Cardiovascular: Negative for chest pain, palpitations and leg swelling  Gastrointestinal: Negative for abdominal pain, constipation, diarrhea, nausea and vomiting  Endocrine: Negative for cold intolerance, heat intolerance, polydipsia and polyuria  Genitourinary: Negative for difficulty urinating and frequency  Musculoskeletal: Negative for arthralgias, back pain, joint swelling and myalgias  Skin: Negative for rash  Neurological: Negative for dizziness, weakness, light-headedness, numbness and headaches  Hematological: Negative for adenopathy  Psychiatric/Behavioral: Negative for behavioral problems, confusion, dysphoric mood, sleep disturbance and suicidal ideas  The patient is not nervous/anxious  Historical Information   The patient history was reviewed as follows:    Past Medical History:   Diagnosis Date    Cataract     left eye    Hot flashes     Hypertension     Migraine     menstural migraines    Restless leg syndrome      Past Surgical History:   Procedure Laterality Date    EYE SURGERY      WISDOM TOOTH EXTRACTION Bilateral      Family History   Problem Relation Age of Onset    Hypertension Mother     Thyroid disease Mother     Uterine cancer Mother     Hypertension Father     Diabetes Maternal Grandmother     Alzheimer's disease Maternal Grandmother     Alzheimer's disease Maternal Grandfather     No Known Problems Daughter       Social History       Medications:     Current Outpatient Medications:     Cholecalciferol (VITAMIN D3) 1000 units CAPS, Take 1 capsule by mouth daily , Disp: , Rfl:     EPINEPHrine (EPIPEN) 0 3 mg/0 3 mL SOAJ, Inject 0 3 mL (0 3 mg total) into a muscle once for 1 dose, Disp: 0 6 mL, Rfl: 0    hydrochlorothiazide (HYDRODIURIL) 25 mg tablet, Take 1 tablet (25 mg total) by mouth daily, Disp: 90 tablet, Rfl: 0    loratadine (CLARITIN) 10 mg tablet, Take 1 tablet by mouth daily, Disp: , Rfl:     rOPINIRole (REQUIP) 1 mg tablet, Take 2 tabs at 7pm, Disp: 180 tablet, Rfl: 1    vitamin E, tocopherol, 400 units capsule, Take 400 Units by mouth daily, Disp: , Rfl:     ketorolac (ACULAR) 0 5 % ophthalmic solution, , Disp: , Rfl:     prednisoLONE acetate (PRED FORTE) 1 % ophthalmic suspension, , Disp: , Rfl:   Allergies   Allergen Reactions    Amoxicillin Edema     Category:  Allergy;        OBJECTIVE    Vitals:   Vitals:    09/21/20 1525   BP: 122/78   Pulse: 77   Temp: (!) 97 3 °F (36 3 °C)   SpO2: 97%   Weight: 59 9 kg (132 lb)   Height: 5' (1 524 m)           Physical Exam  Vitals signs and nursing note reviewed  Constitutional:       General: She is not in acute distress  Appearance: Normal appearance  She is not ill-appearing, toxic-appearing or diaphoretic  HENT:      Head: Normocephalic and atraumatic  Right Ear: External ear normal       Left Ear: External ear normal    Cardiovascular:      Rate and Rhythm: Normal rate and regular rhythm  Heart sounds: No murmur  No friction rub  Pulmonary:      Effort: Pulmonary effort is normal  No respiratory distress  Breath sounds: Normal breath sounds  No stridor  No wheezing, rhonchi or rales  Musculoskeletal:         General: No swelling  Neurological:      General: No focal deficit present  Mental Status: She is alert  Mental status is at baseline  Psychiatric:         Attention and Perception: Attention normal          Mood and Affect: Mood normal          Speech: Speech normal          Behavior: Behavior normal          Thought Content: Thought content normal          Judgment: Judgment normal           Jaxon David MD  [address] [facility]   9/21/2020  3:52 PM    BMI Counseling: Body mass index is 25 78 kg/m²  The BMI is above normal  Nutrition recommendations include reducing portion sizes and 3-5 servings of fruits/vegetables daily

## 2020-10-30 ENCOUNTER — APPOINTMENT (OUTPATIENT)
Dept: PHYSICAL THERAPY | Facility: CLINIC | Age: 40
End: 2020-10-30
Payer: COMMERCIAL

## 2020-11-06 DIAGNOSIS — I10 HYPERTENSION, UNSPECIFIED TYPE: ICD-10-CM

## 2020-11-06 DIAGNOSIS — G43.009 MIGRAINE WITHOUT AURA AND WITHOUT STATUS MIGRAINOSUS, NOT INTRACTABLE: Primary | ICD-10-CM

## 2020-11-06 RX ORDER — RIZATRIPTAN BENZOATE 10 MG/1
10 TABLET ORAL ONCE AS NEEDED
Qty: 9 TABLET | Refills: 0 | Status: SHIPPED | OUTPATIENT
Start: 2020-11-06 | End: 2022-04-14 | Stop reason: ALTCHOICE

## 2020-11-06 RX ORDER — HYDROCHLOROTHIAZIDE 25 MG/1
25 TABLET ORAL DAILY
Qty: 90 TABLET | Refills: 0 | Status: SHIPPED | OUTPATIENT
Start: 2020-11-06 | End: 2021-02-22 | Stop reason: SDUPTHER

## 2020-11-13 ENCOUNTER — EVALUATION (OUTPATIENT)
Dept: PHYSICAL THERAPY | Facility: CLINIC | Age: 40
End: 2020-11-13
Payer: COMMERCIAL

## 2020-11-13 DIAGNOSIS — M54.50 CHRONIC BILATERAL LOW BACK PAIN, UNSPECIFIED WHETHER SCIATICA PRESENT: Primary | ICD-10-CM

## 2020-11-13 DIAGNOSIS — G89.29 CHRONIC BILATERAL LOW BACK PAIN, UNSPECIFIED WHETHER SCIATICA PRESENT: Primary | ICD-10-CM

## 2020-11-13 DIAGNOSIS — M62.838 MUSCLE SPASM: ICD-10-CM

## 2020-11-13 PROCEDURE — 97161 PT EVAL LOW COMPLEX 20 MIN: CPT

## 2020-11-20 ENCOUNTER — OFFICE VISIT (OUTPATIENT)
Dept: PHYSICAL THERAPY | Facility: CLINIC | Age: 40
End: 2020-11-20
Payer: COMMERCIAL

## 2020-11-20 DIAGNOSIS — M62.838 MUSCLE SPASM: ICD-10-CM

## 2020-11-20 DIAGNOSIS — M54.50 CHRONIC BILATERAL LOW BACK PAIN, UNSPECIFIED WHETHER SCIATICA PRESENT: Primary | ICD-10-CM

## 2020-11-20 DIAGNOSIS — G89.29 CHRONIC BILATERAL LOW BACK PAIN, UNSPECIFIED WHETHER SCIATICA PRESENT: Primary | ICD-10-CM

## 2020-11-20 PROCEDURE — 97530 THERAPEUTIC ACTIVITIES: CPT

## 2020-11-20 PROCEDURE — 97110 THERAPEUTIC EXERCISES: CPT

## 2020-11-27 ENCOUNTER — OFFICE VISIT (OUTPATIENT)
Dept: PHYSICAL THERAPY | Facility: CLINIC | Age: 40
End: 2020-11-27
Payer: COMMERCIAL

## 2020-11-27 DIAGNOSIS — M62.838 MUSCLE SPASM: ICD-10-CM

## 2020-11-27 DIAGNOSIS — M54.50 CHRONIC BILATERAL LOW BACK PAIN, UNSPECIFIED WHETHER SCIATICA PRESENT: Primary | ICD-10-CM

## 2020-11-27 DIAGNOSIS — G89.29 CHRONIC BILATERAL LOW BACK PAIN, UNSPECIFIED WHETHER SCIATICA PRESENT: Primary | ICD-10-CM

## 2020-11-27 PROCEDURE — 97140 MANUAL THERAPY 1/> REGIONS: CPT

## 2020-11-27 PROCEDURE — 97530 THERAPEUTIC ACTIVITIES: CPT

## 2020-11-27 PROCEDURE — 97110 THERAPEUTIC EXERCISES: CPT

## 2020-12-03 ENCOUNTER — OFFICE VISIT (OUTPATIENT)
Dept: FAMILY MEDICINE CLINIC | Facility: CLINIC | Age: 40
End: 2020-12-03
Payer: COMMERCIAL

## 2020-12-03 VITALS
OXYGEN SATURATION: 98 % | DIASTOLIC BLOOD PRESSURE: 82 MMHG | TEMPERATURE: 97.6 F | BODY MASS INDEX: 25.8 KG/M2 | WEIGHT: 131.4 LBS | HEIGHT: 60 IN | SYSTOLIC BLOOD PRESSURE: 120 MMHG | HEART RATE: 67 BPM

## 2020-12-03 DIAGNOSIS — Z01.818 PRE-OP EXAMINATION: ICD-10-CM

## 2020-12-03 DIAGNOSIS — H26.9 CATARACT OF LEFT EYE, UNSPECIFIED CATARACT TYPE: Primary | ICD-10-CM

## 2020-12-03 DIAGNOSIS — G25.81 RLS (RESTLESS LEGS SYNDROME): ICD-10-CM

## 2020-12-03 PROCEDURE — 99214 OFFICE O/P EST MOD 30 MIN: CPT | Performed by: FAMILY MEDICINE

## 2020-12-03 RX ORDER — ROPINIROLE 1 MG/1
TABLET, FILM COATED ORAL
Qty: 180 TABLET | Refills: 0 | Status: SHIPPED | OUTPATIENT
Start: 2020-12-03 | End: 2020-12-14 | Stop reason: SDUPTHER

## 2020-12-04 ENCOUNTER — OFFICE VISIT (OUTPATIENT)
Dept: PHYSICAL THERAPY | Facility: CLINIC | Age: 40
End: 2020-12-04
Payer: COMMERCIAL

## 2020-12-04 DIAGNOSIS — G89.29 CHRONIC BILATERAL LOW BACK PAIN, UNSPECIFIED WHETHER SCIATICA PRESENT: Primary | ICD-10-CM

## 2020-12-04 DIAGNOSIS — M54.50 CHRONIC BILATERAL LOW BACK PAIN, UNSPECIFIED WHETHER SCIATICA PRESENT: Primary | ICD-10-CM

## 2020-12-04 DIAGNOSIS — M62.838 MUSCLE SPASM: ICD-10-CM

## 2020-12-04 PROCEDURE — 97530 THERAPEUTIC ACTIVITIES: CPT

## 2020-12-11 ENCOUNTER — OFFICE VISIT (OUTPATIENT)
Dept: PHYSICAL THERAPY | Facility: CLINIC | Age: 40
End: 2020-12-11
Payer: COMMERCIAL

## 2020-12-11 DIAGNOSIS — G89.29 CHRONIC BILATERAL LOW BACK PAIN, UNSPECIFIED WHETHER SCIATICA PRESENT: Primary | ICD-10-CM

## 2020-12-11 DIAGNOSIS — M54.50 CHRONIC BILATERAL LOW BACK PAIN, UNSPECIFIED WHETHER SCIATICA PRESENT: Primary | ICD-10-CM

## 2020-12-11 DIAGNOSIS — M62.838 MUSCLE SPASM: ICD-10-CM

## 2020-12-11 PROCEDURE — 97530 THERAPEUTIC ACTIVITIES: CPT

## 2020-12-11 PROCEDURE — 97140 MANUAL THERAPY 1/> REGIONS: CPT

## 2020-12-14 DIAGNOSIS — G25.81 RLS (RESTLESS LEGS SYNDROME): ICD-10-CM

## 2020-12-14 RX ORDER — ROPINIROLE 1 MG/1
TABLET, FILM COATED ORAL
Qty: 14 TABLET | Refills: 0 | Status: SHIPPED | OUTPATIENT
Start: 2020-12-14 | End: 2021-03-11 | Stop reason: SDUPTHER

## 2020-12-23 ENCOUNTER — IMMUNIZATIONS (OUTPATIENT)
Dept: FAMILY MEDICINE CLINIC | Facility: HOSPITAL | Age: 40
End: 2020-12-23
Payer: COMMERCIAL

## 2020-12-23 DIAGNOSIS — Z23 ENCOUNTER FOR IMMUNIZATION: ICD-10-CM

## 2020-12-23 PROCEDURE — 0001A SARS-COV-2 / COVID-19 MRNA VACCINE (PFIZER-BIONTECH) 30 MCG: CPT

## 2020-12-23 PROCEDURE — 91300 SARS-COV-2 / COVID-19 MRNA VACCINE (PFIZER-BIONTECH) 30 MCG: CPT

## 2021-01-14 ENCOUNTER — IMMUNIZATIONS (OUTPATIENT)
Dept: FAMILY MEDICINE CLINIC | Facility: HOSPITAL | Age: 41
End: 2021-01-14

## 2021-01-14 DIAGNOSIS — Z23 ENCOUNTER FOR IMMUNIZATION: Primary | ICD-10-CM

## 2021-01-14 PROCEDURE — 91300 SARS-COV-2 / COVID-19 MRNA VACCINE (PFIZER-BIONTECH) 30 MCG: CPT

## 2021-01-14 PROCEDURE — 0002A SARS-COV-2 / COVID-19 MRNA VACCINE (PFIZER-BIONTECH) 30 MCG: CPT

## 2021-02-22 ENCOUNTER — OFFICE VISIT (OUTPATIENT)
Dept: URGENT CARE | Facility: CLINIC | Age: 41
End: 2021-02-22
Payer: COMMERCIAL

## 2021-02-22 ENCOUNTER — TELEPHONE (OUTPATIENT)
Dept: FAMILY MEDICINE CLINIC | Facility: CLINIC | Age: 41
End: 2021-02-22

## 2021-02-22 VITALS
BODY MASS INDEX: 25.52 KG/M2 | OXYGEN SATURATION: 99 % | SYSTOLIC BLOOD PRESSURE: 135 MMHG | RESPIRATION RATE: 18 BRPM | TEMPERATURE: 98.1 F | DIASTOLIC BLOOD PRESSURE: 82 MMHG | HEIGHT: 60 IN | HEART RATE: 69 BPM | WEIGHT: 130 LBS

## 2021-02-22 DIAGNOSIS — N20.0 KIDNEY STONE: ICD-10-CM

## 2021-02-22 DIAGNOSIS — R10.9 FLANK PAIN: Primary | ICD-10-CM

## 2021-02-22 DIAGNOSIS — R31.9 HEMATURIA, UNSPECIFIED TYPE: ICD-10-CM

## 2021-02-22 DIAGNOSIS — I10 HYPERTENSION, UNSPECIFIED TYPE: ICD-10-CM

## 2021-02-22 LAB
SL AMB  POCT GLUCOSE, UA: NEGATIVE
SL AMB LEUKOCYTE ESTERASE,UA: NEGATIVE
SL AMB POCT BILIRUBIN,UA: NEGATIVE
SL AMB POCT BLOOD,UA: ABNORMAL
SL AMB POCT CLARITY,UA: CLEAR
SL AMB POCT COLOR,UA: ABNORMAL
SL AMB POCT KETONES,UA: ABNORMAL
SL AMB POCT NITRITE,UA: NEGATIVE
SL AMB POCT PH,UA: 6.5
SL AMB POCT SPECIFIC GRAVITY,UA: 1.02
SL AMB POCT URINE PROTEIN: ABNORMAL
SL AMB POCT UROBILINOGEN: 0.2

## 2021-02-22 PROCEDURE — G0382 LEV 3 HOSP TYPE B ED VISIT: HCPCS | Performed by: PHYSICIAN ASSISTANT

## 2021-02-22 PROCEDURE — 81002 URINALYSIS NONAUTO W/O SCOPE: CPT | Performed by: PHYSICIAN ASSISTANT

## 2021-02-22 RX ORDER — KETOROLAC TROMETHAMINE 30 MG/ML
30 INJECTION, SOLUTION INTRAMUSCULAR; INTRAVENOUS ONCE
Status: DISCONTINUED | OUTPATIENT
Start: 2021-02-22 | End: 2021-02-22

## 2021-02-22 RX ORDER — KETOROLAC TROMETHAMINE 30 MG/ML
30 INJECTION, SOLUTION INTRAMUSCULAR; INTRAVENOUS ONCE
Status: COMPLETED | OUTPATIENT
Start: 2021-02-22 | End: 2021-02-22

## 2021-02-22 RX ORDER — HYDROCHLOROTHIAZIDE 25 MG/1
25 TABLET ORAL DAILY
Qty: 90 TABLET | Refills: 0 | Status: SHIPPED | OUTPATIENT
Start: 2021-02-22 | End: 2021-06-14 | Stop reason: SDUPTHER

## 2021-02-22 RX ORDER — NAPROXEN 500 MG/1
500 TABLET ORAL 2 TIMES DAILY WITH MEALS
Qty: 28 TABLET | Refills: 0 | Status: SHIPPED | OUTPATIENT
Start: 2021-02-22 | End: 2021-05-17

## 2021-02-22 RX ADMIN — KETOROLAC TROMETHAMINE 30 MG: 30 INJECTION, SOLUTION INTRAMUSCULAR; INTRAVENOUS at 11:45

## 2021-02-22 NOTE — PROGRESS NOTES
[facility]        NAME: Vin Palacios is a 47 y o  female  : 1980    MRN: [id number]  DATE: 2021  TIME: 12:46 PM    Assessment and Plan   Flank pain [R10 9]  1  Flank pain  POCT urine dip    ketorolac (TORADOL) injection 30 mg   2  Kidney stone  naproxen (EC NAPROSYN) 500 MG EC tablet    DISCONTINUED: ketorolac (TORADOL) injection 30 mg     Gave Toradol here   Reccommended pt call PCP for outpt CT scan for possible kidney stone   Recommended ER if worsening symptoms       Patient Instructions   Patient Instructions     Strain your urine for 24-48 hours  If you see a stone bring it into your PCP  Any increase in pain, fever, nausea, vomiting go to ER  Call your PCP to try and schedule an out pt CT        Kidney Stones   WHAT YOU NEED TO KNOW:   Kidney stones form in the urinary system when the water and waste in your urine are out of balance  When this happens, certain types of waste crystals separate from the urine  The crystals build up and form kidney stones  You may have more than one kidney stone  DISCHARGE INSTRUCTIONS:   Return to the emergency department if:   · You have vomiting that is not relieved by medicine  Contact your healthcare provider if:   · You have a fever  · You have trouble passing urine  · You see blood in your urine  · You have severe pain  · You have any questions or concerns about your condition or care  Medicines:   · NSAIDs , such as ibuprofen, help decrease swelling, pain, and fever  This medicine is available with or without a doctor's order  NSAIDs can cause stomach bleeding or kidney problems in certain people  If you take blood thinner medicine, always ask your healthcare provider if NSAIDs are safe for you  Always read the medicine label and follow directions  · Prescription pain medicine  may be given  Ask your healthcare provider how to take this medicine safely   Some prescription pain medicines contain acetaminophen  Do not take other medicines that contain acetaminophen without talking to your healthcare provider  Too much acetaminophen may cause liver damage  Prescription pain medicine may cause constipation  Ask your healthcare provider how to prevent or treat constipation  · Medicines  to balance your electrolytes may be needed  · Take your medicine as directed  Contact your healthcare provider if you think your medicine is not helping or if you have side effects  Tell him or her if you are allergic to any medicine  Keep a list of the medicines, vitamins, and herbs you take  Include the amounts, and when and why you take them  Bring the list or the pill bottles to follow-up visits  Carry your medicine list with you in case of an emergency  Follow up with your healthcare provider as directed: You may need to return for more tests  Write down your questions so you remember to ask them during your visits  What you can do to manage kidney stones:   · Drink more liquids  Your healthcare provider may tell you to drink at least 8 to 12 (eight-ounce) cups of liquids each day  This helps flush out the kidney stones when you urinate  Water is the best liquid to drink  · Strain your urine every time you go to the bathroom  Urinate through a strainer or a piece of thin cloth to catch the stones  Take the stones to your healthcare provider so they can be sent to the lab for tests  This will help your healthcare providers plan the best treatment for you  · Eat a variety of healthy foods  Healthy foods include fruits, vegetables, whole-grain breads, low-fat dairy products, beans, and fish  You may need to limit how much sodium (salt) or protein you eat  Ask for information about the best foods for you  · Stay active  Your stones may pass more easily if you stay active  Exercise can also help you manage your weight  Ask about the best activities for you      After you pass the kidney stones: Your healthcare provider may  order a 24-hour urine test  Results from a 24-hour urine test will help your healthcare provider plan ways to prevent more stones from forming  Your healthcare provider will give you more instructions  © Copyright [address] Information is for End User's use only and may not be sold, redistributed or otherwise used for commercial purposes  All illustrations and images included in CareNotes® are the copyrighted property of A D A M , Inc  or [facility] Yosef Walden   The above information is an  only  It is not intended as medical advice for individual conditions or treatments  Talk to your doctor, nurse or pharmacist before following any medical regimen to see if it is safe and effective for you  Follow up with PCP in 3-5 days  Proceed to  ER if symptoms worsen  Chief Complaint     Chief Complaint   Patient presents with    Pelvic Pain     past 4-5 days, pt describes it as a throb    Flank Pain         History of Present Illness       The pt is a 77-year-old female presenting with 5 days of a throbbing pelvic pain/ low back pain in the left ureteral distribution  It begins in her bladder and radiates to the left lower back  8/10 this morning and now a 3/10  She was sent home from work  Denies fever, nausea, vomiting  Admits to a prior episode many years ago  Review of Systems   Review of Systems   Constitutional: Negative for activity change, appetite change, chills, diaphoresis, fatigue and fever  Respiratory: Negative for chest tightness and shortness of breath  Cardiovascular: Negative for chest pain and palpitations  Gastrointestinal: Positive for abdominal pain  Negative for abdominal distention, constipation, diarrhea, nausea, rectal pain and vomiting  Genitourinary: Positive for flank pain   Negative for decreased urine volume, difficulty urinating, dysuria, frequency, hematuria, pelvic pain, urgency, vaginal bleeding and vaginal pain          Current Medications       Current Outpatient Medications:     Cholecalciferol (VITAMIN D3) 1000 units CAPS, Take 1 capsule by mouth daily , Disp: , Rfl:     EPINEPHrine (EPIPEN) 0 3 mg/0 3 mL SOAJ, Inject 0 3 mL (0 3 mg total) into a muscle once for 1 dose, Disp: 0 6 mL, Rfl: 0    hydrochlorothiazide (HYDRODIURIL) 25 mg tablet, Take 1 tablet (25 mg total) by mouth daily, Disp: 90 tablet, Rfl: 0    loratadine (CLARITIN) 10 mg tablet, Take 1 tablet by mouth daily, Disp: , Rfl:     rizatriptan (MAXALT) 10 MG tablet, Take 1 tablet (10 mg total) by mouth once as needed for migraine for up to 1 dose May repeat in 2 hours if needed, Disp: 9 tablet, Rfl: 0    rOPINIRole (REQUIP) 1 mg tablet, Take 2 tabs at 7pm, Disp: 14 tablet, Rfl: 0    vitamin E, tocopherol, 400 units capsule, Take 400 Units by mouth daily, Disp: , Rfl:     ketorolac (ACULAR) 0 5 % ophthalmic solution, , Disp: , Rfl:     naproxen (EC NAPROSYN) 500 MG EC tablet, Take 1 tablet (500 mg total) by mouth 2 (two) times a day with meals for 14 days, Disp: 28 tablet, Rfl: 0    prednisoLONE acetate (PRED FORTE) 1 % ophthalmic suspension, , Disp: , Rfl:   No current facility-administered medications for this visit       Current Allergies     Allergies as of 02/22/2021 - Reviewed 02/22/2021   Allergen Reaction Noted    Amoxicillin Edema 08/13/2015            The following portions of the patient's history were reviewed and updated as appropriate: allergies, current medications, past family history, past medical history, past social history, past surgical history and problem list      Past Medical History:   Diagnosis Date    Cataract     left eye    Hot flashes     Hypertension     Migraine     menstural migraines    Restless leg syndrome        Past Surgical History:   Procedure Laterality Date    EYE SURGERY      WISDOM TOOTH EXTRACTION Bilateral        Family History   Problem Relation Age of Onset    Hypertension Mother    [address] [facility] Thyroid disease Mother     Uterine cancer Mother     Hypertension Father     Diabetes Maternal Grandmother     Alzheimer's disease Maternal Grandmother     Alzheimer's disease Maternal Grandfather     No Known Problems Daughter          Medications have been verified  Objective   /82   Pulse 69   Temp 98 1 °F (36 7 °C) (Temporal)   Resp 18   Ht 5' (1 524 m)   Wt 59 kg (130 lb)   LMP 02/08/2021 (Exact Date)   SpO2 99%   BMI 25 39 kg/m²        Physical Exam     Physical Exam  Vitals signs reviewed  Constitutional:       General: She is not in acute distress  Appearance: Normal appearance  She is normal weight  She is not ill-appearing, toxic-appearing or diaphoretic  HENT:      Head: Normocephalic and atraumatic  Cardiovascular:      Rate and Rhythm: Normal rate and regular rhythm  Heart sounds: Normal heart sounds  No murmur  No friction rub  No gallop  Pulmonary:      Effort: Pulmonary effort is normal  No respiratory distress  Breath sounds: Normal breath sounds  No stridor  No wheezing, rhonchi or rales  Chest:      Chest wall: No tenderness  Abdominal:      General: Abdomen is flat  Bowel sounds are normal  There is no distension  Palpations: There is no mass  Tenderness: There is abdominal tenderness (LLQ)  There is no right CVA tenderness, left CVA tenderness, guarding or rebound  Hernia: No hernia is present  Skin:     General: Skin is warm and dry  Capillary Refill: Capillary refill takes less than 2 seconds  Neurological:      Mental Status: She is alert

## 2021-02-22 NOTE — LETTER
February 22, 2021     Patient: Manan Castañeda   YOB: 1980   Date of Visit: 2/22/2021       To Whom it May Concern:    Salinas Aguirre is under my professional care  She was seen in my office on 2/22/2021  She may return to work on 2/23/2021  If you have any questions or concerns, please don't hesitate to call           Sincerely,          Keny Dumont PA-C        CC: No Recipients

## 2021-02-22 NOTE — TELEPHONE ENCOUNTER
w they think she has a kidney stoone  They asked her to call her pcp for a ct scan order   Can you order this and call constantino when done so she can set up appt

## 2021-02-22 NOTE — TELEPHONE ENCOUNTER
Oh manny patrick I typed that in - she was seen over at [facility] this morning  On her visit summary it stated about calling pcp to have  A ct scan ordered for they kidney stone

## 2021-02-22 NOTE — PATIENT INSTRUCTIONS
Strain your urine for 24-48 hours  If you see a stone bring it into your PCP  Any increase in pain, fever, nausea, vomiting go to ER  Call your PCP to try and schedule an out pt CT        Kidney Stones   WHAT YOU NEED TO KNOW:   Kidney stones form in the urinary system when the water and waste in your urine are out of balance  When this happens, certain types of waste crystals separate from the urine  The crystals build up and form kidney stones  You may have more than one kidney stone  DISCHARGE INSTRUCTIONS:   Return to the emergency department if:   · You have vomiting that is not relieved by medicine  Contact your healthcare provider if:   · You have a fever  · You have trouble passing urine  · You see blood in your urine  · You have severe pain  · You have any questions or concerns about your condition or care  Medicines:   · NSAIDs , such as ibuprofen, help decrease swelling, pain, and fever  This medicine is available with or without a doctor's order  NSAIDs can cause stomach bleeding or kidney problems in certain people  If you take blood thinner medicine, always ask your healthcare provider if NSAIDs are safe for you  Always read the medicine label and follow directions  · Prescription pain medicine  may be given  Ask your healthcare provider how to take this medicine safely  Some prescription pain medicines contain acetaminophen  Do not take other medicines that contain acetaminophen without talking to your healthcare provider  Too much acetaminophen may cause liver damage  Prescription pain medicine may cause constipation  Ask your healthcare provider how to prevent or treat constipation  · Medicines  to balance your electrolytes may be needed  · Take your medicine as directed  Contact your healthcare provider if you think your medicine is not helping or if you have side effects  Tell him or her if you are allergic to any medicine   Keep a list of the medicines, vitamins, and herbs you take  Include the amounts, and when and why you take them  Bring the list or the pill bottles to follow-up visits  Carry your medicine list with you in case of an emergency  Follow up with your healthcare provider as directed: You may need to return for more tests  Write down your questions so you remember to ask them during your visits  What you can do to manage kidney stones:   · Drink more liquids  Your healthcare provider may tell you to drink at least 8 to 12 (eight-ounce) cups of liquids each day  This helps flush out the kidney stones when you urinate  Water is the best liquid to drink  · Strain your urine every time you go to the bathroom  Urinate through a strainer or a piece of thin cloth to catch the stones  Take the stones to your healthcare provider so they can be sent to the lab for tests  This will help your healthcare providers plan the best treatment for you  · Eat a variety of healthy foods  Healthy foods include fruits, vegetables, whole-grain breads, low-fat dairy products, beans, and fish  You may need to limit how much sodium (salt) or protein you eat  Ask for information about the best foods for you  · Stay active  Your stones may pass more easily if you stay active  Exercise can also help you manage your weight  Ask about the best activities for you  After you pass the kidney stones: Your healthcare provider may  order a 24-hour urine test  Results from a 24-hour urine test will help your healthcare provider plan ways to prevent more stones from forming  Your healthcare provider will give you more instructions  © Copyright [address] Information is for End User's use only and may not be sold, redistributed or otherwise used for commercial purposes  All illustrations and images included in CareNotes® are the copyrighted property of [employer]  or Ethan Walden   The above information is an  only   It is not intended as medical advice for individual conditions or treatments  Talk to your doctor, nurse or pharmacist before following any medical regimen to see if it is safe and effective for you

## 2021-02-24 ENCOUNTER — HOSPITAL ENCOUNTER (OUTPATIENT)
Dept: CT IMAGING | Facility: CLINIC | Age: 41
Discharge: HOME | End: 2021-02-24
Payer: COMMERCIAL

## 2021-02-24 DIAGNOSIS — R10.9 FLANK PAIN: ICD-10-CM

## 2021-02-24 DIAGNOSIS — R31.9 HEMATURIA, UNSPECIFIED TYPE: ICD-10-CM

## 2021-02-24 PROCEDURE — G1004 CDSM NDSC: HCPCS

## 2021-02-24 PROCEDURE — 74176 CT ABD & PELVIS W/O CONTRAST: CPT

## 2021-02-25 ENCOUNTER — TELEPHONE (OUTPATIENT)
Dept: UROLOGY | Facility: MEDICAL CENTER | Age: 41
End: 2021-02-25

## 2021-02-25 DIAGNOSIS — N20.0 NEPHROLITHIASIS: Primary | ICD-10-CM

## 2021-02-25 NOTE — TELEPHONE ENCOUNTER
Patient is new looking to set up appointment for stone issue         Insurance: [employer] [id number]    History of Cancer: no    Previous urologist: no     Outside testing/where: [facility]     If yes,what kind: ct sca    Records requested/where:    Preferred location: [place]/ [place] normal rate and rhythm, no chest pain and no edema.

## 2021-03-01 ENCOUNTER — OFFICE VISIT (OUTPATIENT)
Dept: UROLOGY | Facility: AMBULATORY SURGERY CENTER | Age: 41
End: 2021-03-01
Payer: COMMERCIAL

## 2021-03-01 VITALS
BODY MASS INDEX: 27.52 KG/M2 | DIASTOLIC BLOOD PRESSURE: 76 MMHG | HEIGHT: 60 IN | WEIGHT: 140.2 LBS | SYSTOLIC BLOOD PRESSURE: 110 MMHG | HEART RATE: 76 BPM

## 2021-03-01 DIAGNOSIS — N20.0 NEPHROLITHIASIS: Primary | ICD-10-CM

## 2021-03-01 LAB
BACTERIA UR QL AUTO: ABNORMAL /HPF
BILIRUB UR QL STRIP: NEGATIVE
CLARITY UR: CLEAR
COLOR UR: YELLOW
GLUCOSE UR STRIP-MCNC: NEGATIVE MG/DL
HGB UR QL STRIP.AUTO: ABNORMAL
HYALINE CASTS #/AREA URNS LPF: ABNORMAL /LPF
KETONES UR STRIP-MCNC: NEGATIVE MG/DL
LEUKOCYTE ESTERASE UR QL STRIP: NEGATIVE
NITRITE UR QL STRIP: NEGATIVE
NON-SQ EPI CELLS URNS QL MICRO: ABNORMAL /HPF
PH UR STRIP.AUTO: 7.5 [PH]
PROT UR STRIP-MCNC: NEGATIVE MG/DL
RBC #/AREA URNS AUTO: ABNORMAL /HPF
SL AMB  POCT GLUCOSE, UA: NORMAL
SL AMB LEUKOCYTE ESTERASE,UA: NORMAL
SL AMB POCT BILIRUBIN,UA: NORMAL
SL AMB POCT BLOOD,UA: NORMAL
SL AMB POCT CLARITY,UA: CLEAR
SL AMB POCT COLOR,UA: YELLOW
SL AMB POCT KETONES,UA: NORMAL
SL AMB POCT NITRITE,UA: NORMAL
SL AMB POCT PH,UA: 7.5
SL AMB POCT SPECIFIC GRAVITY,UA: 1
SL AMB POCT URINE PROTEIN: NORMAL
SL AMB POCT UROBILINOGEN: NORMAL
SP GR UR STRIP.AUTO: 1.01 (ref 1–1.03)
UROBILINOGEN UR QL STRIP.AUTO: 0.2 E.U./DL
WBC #/AREA URNS AUTO: ABNORMAL /HPF

## 2021-03-01 PROCEDURE — 87086 URINE CULTURE/COLONY COUNT: CPT | Performed by: NURSE PRACTITIONER

## 2021-03-01 PROCEDURE — 81001 URINALYSIS AUTO W/SCOPE: CPT | Performed by: NURSE PRACTITIONER

## 2021-03-01 PROCEDURE — 81002 URINALYSIS NONAUTO W/O SCOPE: CPT | Performed by: NURSE PRACTITIONER

## 2021-03-01 PROCEDURE — 99244 OFF/OP CNSLTJ NEW/EST MOD 40: CPT | Performed by: NURSE PRACTITIONER

## 2021-03-01 RX ORDER — TRAMADOL HYDROCHLORIDE 50 MG/1
50 TABLET ORAL EVERY 6 HOURS PRN
Qty: 10 TABLET | Refills: 0 | Status: SHIPPED | OUTPATIENT
Start: 2021-03-01 | End: 2021-03-17 | Stop reason: ALTCHOICE

## 2021-03-01 RX ORDER — TAMSULOSIN HYDROCHLORIDE 0.4 MG/1
0.4 CAPSULE ORAL
Qty: 30 CAPSULE | Refills: 0 | Status: SHIPPED | OUTPATIENT
Start: 2021-03-01 | End: 2021-05-17

## 2021-03-01 NOTE — PROGRESS NOTES
3/1/2021      Chief Complaint   Patient presents with    Nephrolithiasis         Assessment and Plan      1  Nephrolithiasis  · We reviewed the results of her recent CT stone study which identified a 5 mm right UVJ calculus with mild hydronephrosis  Patient is comfortable at this time  Based on size and location of stone, we discussed medical expulsive therapy  Prescription for tamsulosin 0 4 mg HS was electronically sent to her pharmacy  Reviewed mechanism of action as well as potential side effects  Patient also encouraged to increase her water intake and strain all urine  · Take ibuprofen or acetaminophen PRN pain  · Schedule repeat imaging of KUB and U/S in 10-14 days to reevaluate  We will call with her results  Reviewed ER precautions  History of Present Illness  Isaiah Rubio is a 47 y o  female here for evaluation of 5 mm left stone just proximal of the UVJ present on imaging from 2/24/21 ordered by her PCP  Patient reports symptoms of left flank and groin pain began back on 2/18/21  At that time, she was initially evaluated at urgent care center  She explains that the pain has overall passed but she had multiple episodes of pain that caused her to double over a few times last week at work and at night  She states that she doesn't feel the pain enough to give it a number on a scale from 1-10, but notes she feels like something is wrong  She denies any hematuria or vomiting, but has had increased nausea and bloating in the past few days  Patient has had no lower urinary tract symptoms, fever, or chills  She reports a previous history of kidney stones over 12 years ago when she was pregnant  Patient denies any family history of kidney stones  Review of Systems   Constitutional: Negative  HENT: Negative  Respiratory: Negative  Cardiovascular: Negative  Gastrointestinal: Negative  Genitourinary: Positive for flank pain   Negative for decreased urine volume, difficulty urinating, dysuria, frequency, hematuria and urgency  Skin: Negative  Neurological: Negative  Psychiatric/Behavioral: Negative  Vitals  Vitals:    03/01/21 1443   BP: 110/76   Pulse: 76   Weight: 63 6 kg (140 lb 3 2 oz)   Height: 5' (1 524 m)       Physical Exam  Constitutional:       Appearance: Normal appearance  She is well-developed  HENT:      Head: Normocephalic  Eyes:      Pupils: Pupils are equal, round, and reactive to light  Neck:      Musculoskeletal: Normal range of motion  Pulmonary:      Effort: Pulmonary effort is normal    Abdominal:      Palpations: Abdomen is soft  Genitourinary:     Comments: Negative CVA tenderness  Musculoskeletal: Normal range of motion  Skin:     General: Skin is warm and dry  Neurological:      General: No focal deficit present  Mental Status: She is alert and oriented to person, place, and time  Psychiatric:         Mood and Affect: Mood normal          Behavior: Behavior normal          Thought Content:  Thought content normal          Judgment: Judgment normal            Past History  Past Medical History:   Diagnosis Date    Cataract     left eye    Hot flashes     Hypertension     Migraine     menstural migraines    Restless leg syndrome      Social History     Socioeconomic History    Marital status: Single     Spouse name: None    Number of children: None    Years of education: None    Highest education level: None   Occupational History    None   Social Needs    Financial resource strain: None    Food insecurity     Worry: None     Inability: None    Transportation needs     Medical: None     Non-medical: None   Tobacco Use    Smoking status: Never Smoker    Smokeless tobacco: Never Used   Substance and Sexual Activity    Alcohol use: Yes     Frequency: 2-4 times a month     Comment: social    Drug use: No    Sexual activity: Yes     Partners: Female   Lifestyle    Physical activity     Days per week: None     Minutes per session: None    Stress: None   Relationships    Social connections     Talks on phone: None     Gets together: None     Attends Gnosticism service: None     Active member of club or organization: None     Attends meetings of clubs or organizations: None     Relationship status: None    Intimate partner violence     Fear of current or ex partner: None     Emotionally abused: None     Physically abused: None     Forced sexual activity: None   Other Topics Concern    None   Social History Narrative    None     Social History     Tobacco Use   Smoking Status Never Smoker   Smokeless Tobacco Never Used     Family History   Problem Relation Age of Onset    Hypertension Mother     Thyroid disease Mother     Uterine cancer Mother     Hypertension Father     Diabetes Maternal Grandmother     Alzheimer's disease Maternal Grandmother     Alzheimer's disease Maternal Grandfather     No Known Problems Daughter        The following portions of the patient's history were reviewed and updated as appropriate: allergies, current medications, past medical history, past social history, past surgical history and problem list     Results  Recent Results (from the past 1 hour(s))   POCT urine dip    Collection Time: 03/01/21  2:42 PM   Result Value Ref Range    LEUKOCYTE ESTERASE,UA -     NITRITE,UA -     SL AMB POCT UROBILINOGEN -     POCT URINE PROTEIN -      PH,UA 7 5     BLOOD,UA +     SPECIFIC GRAVITY,UA 1 005     KETONES,UA -     BILIRUBIN,UA -     GLUCOSE, UA -      COLOR,UA yellow     CLARITY,UA clear    ]  No results found for: PSA  Lab Results   Component Value Date    CALCIUM 9 0 02/10/2016    K 3 7 02/10/2016    CO2 26 02/10/2016     02/10/2016    BUN 15 02/21/2017    CREATININE 0 64 02/21/2017     Lab Results   Component Value Date    WBC 8 84 05/13/2019    HGB 14 2 05/13/2019    HCT 42 5 05/13/2019    MCV 86 05/13/2019     05/13/2019

## 2021-03-01 NOTE — TELEPHONE ENCOUNTER
Patient called in to inform she had a OP CT showed a 5mm, 1mm and 2mm stones to pass  Patient stated she does have more discomfort in the evening  Patient stated she has feeling of being bloated and nausea   Patient can be reached at [phone]

## 2021-03-01 NOTE — TELEPHONE ENCOUNTER
Left message for patient to return call to discuss symptoms and triage  Holding 3/3/21 at 3:45 with Dr Albino Emmanuel in the [place] office  When patient returns call please direct back to myself

## 2021-03-01 NOTE — TELEPHONE ENCOUNTER
Spoke with patient  Patient scheduled for 2:30 this afternoon with RUMA Hart in the [place] office

## 2021-03-02 LAB — BACTERIA UR CULT: NORMAL

## 2021-03-03 ENCOUNTER — APPOINTMENT (EMERGENCY)
Dept: CT IMAGING | Facility: HOSPITAL | Age: 41
End: 2021-03-03
Payer: COMMERCIAL

## 2021-03-03 ENCOUNTER — HOSPITAL ENCOUNTER (EMERGENCY)
Facility: HOSPITAL | Age: 41
Discharge: HOME | End: 2021-03-03
Attending: INTERNAL MEDICINE | Admitting: EMERGENCY MEDICINE
Payer: COMMERCIAL

## 2021-03-03 VITALS
WEIGHT: 140 LBS | RESPIRATION RATE: 18 BRPM | OXYGEN SATURATION: 98 % | SYSTOLIC BLOOD PRESSURE: 143 MMHG | DIASTOLIC BLOOD PRESSURE: 65 MMHG | HEART RATE: 66 BPM | BODY MASS INDEX: 27.34 KG/M2 | TEMPERATURE: 97.6 F

## 2021-03-03 DIAGNOSIS — N20.0 KIDNEY STONE: Primary | ICD-10-CM

## 2021-03-03 LAB
ANION GAP SERPL CALCULATED.3IONS-SCNC: 11 MMOL/L (ref 4–13)
BACTERIA UR QL AUTO: NORMAL /HPF
BASOPHILS # BLD AUTO: 0.1 THOUSANDS/ΜL (ref 0–0.1)
BASOPHILS NFR BLD AUTO: 1 % (ref 0–2)
BILIRUB UR QL STRIP: NEGATIVE
BUN SERPL-MCNC: 11 MG/DL (ref 7–25)
CALCIUM SERPL-MCNC: 10.2 MG/DL (ref 8.6–10.5)
CHLORIDE SERPL-SCNC: 100 MMOL/L (ref 98–107)
CLARITY UR: CLEAR
CO2 SERPL-SCNC: 29 MMOL/L (ref 21–31)
COLOR UR: YELLOW
CREAT SERPL-MCNC: 0.74 MG/DL (ref 0.6–1.2)
EOSINOPHIL # BLD AUTO: 0.2 THOUSAND/ΜL (ref 0–0.61)
EOSINOPHIL NFR BLD AUTO: 2 % (ref 0–5)
ERYTHROCYTE [DISTWIDTH] IN BLOOD BY AUTOMATED COUNT: 12.6 % (ref 11.5–14.5)
EXT PREG TEST URINE: NEGATIVE
EXT. CONTROL ED NAV: NORMAL
GFR SERPL CREATININE-BSD FRML MDRD: 103 ML/MIN/1.73SQ M
GLUCOSE SERPL-MCNC: 111 MG/DL (ref 65–99)
GLUCOSE UR STRIP-MCNC: NEGATIVE MG/DL
HCT VFR BLD AUTO: 42.7 % (ref 42–47)
HGB BLD-MCNC: 14.4 G/DL (ref 12–16)
HGB UR QL STRIP.AUTO: ABNORMAL
KETONES UR STRIP-MCNC: NEGATIVE MG/DL
LEUKOCYTE ESTERASE UR QL STRIP: NEGATIVE
LYMPHOCYTES # BLD AUTO: 3 THOUSANDS/ΜL (ref 0.6–4.47)
LYMPHOCYTES NFR BLD AUTO: 32 % (ref 21–51)
MCH RBC QN AUTO: 28.9 PG (ref 26–34)
MCHC RBC AUTO-ENTMCNC: 33.8 G/DL (ref 31–37)
MCV RBC AUTO: 86 FL (ref 81–99)
MONOCYTES # BLD AUTO: 0.7 THOUSAND/ΜL (ref 0.17–1.22)
MONOCYTES NFR BLD AUTO: 7 % (ref 2–12)
NEUTROPHILS # BLD AUTO: 5.6 THOUSANDS/ΜL (ref 1.4–6.5)
NEUTS SEG NFR BLD AUTO: 58 % (ref 42–75)
NITRITE UR QL STRIP: NEGATIVE
NON-SQ EPI CELLS URNS QL MICRO: NORMAL /HPF
PH UR STRIP.AUTO: 7 [PH]
PLATELET # BLD AUTO: 275 THOUSANDS/UL (ref 149–390)
PMV BLD AUTO: 10.1 FL (ref 8.6–11.7)
POTASSIUM SERPL-SCNC: 3.4 MMOL/L (ref 3.5–5.5)
PROT UR STRIP-MCNC: NEGATIVE MG/DL
RBC # BLD AUTO: 4.99 MILLION/UL (ref 3.9–5.2)
RBC #/AREA URNS AUTO: NORMAL /HPF
SODIUM SERPL-SCNC: 140 MMOL/L (ref 134–143)
SP GR UR STRIP.AUTO: <=1.005 (ref 1–1.03)
UROBILINOGEN UR QL STRIP.AUTO: 0.2 E.U./DL
WBC # BLD AUTO: 9.5 THOUSAND/UL (ref 4.8–10.8)
WBC #/AREA URNS AUTO: NORMAL /HPF

## 2021-03-03 PROCEDURE — 81025 URINE PREGNANCY TEST: CPT | Performed by: PHYSICIAN ASSISTANT

## 2021-03-03 PROCEDURE — 80048 BASIC METABOLIC PNL TOTAL CA: CPT | Performed by: PHYSICIAN ASSISTANT

## 2021-03-03 PROCEDURE — 74176 CT ABD & PELVIS W/O CONTRAST: CPT

## 2021-03-03 PROCEDURE — 81001 URINALYSIS AUTO W/SCOPE: CPT | Performed by: PHYSICIAN ASSISTANT

## 2021-03-03 PROCEDURE — 96361 HYDRATE IV INFUSION ADD-ON: CPT

## 2021-03-03 PROCEDURE — 81003 URINALYSIS AUTO W/O SCOPE: CPT | Performed by: PHYSICIAN ASSISTANT

## 2021-03-03 PROCEDURE — G1004 CDSM NDSC: HCPCS

## 2021-03-03 PROCEDURE — 96375 TX/PRO/DX INJ NEW DRUG ADDON: CPT

## 2021-03-03 PROCEDURE — 96374 THER/PROPH/DIAG INJ IV PUSH: CPT

## 2021-03-03 PROCEDURE — 99284 EMERGENCY DEPT VISIT MOD MDM: CPT

## 2021-03-03 PROCEDURE — 36415 COLL VENOUS BLD VENIPUNCTURE: CPT | Performed by: PHYSICIAN ASSISTANT

## 2021-03-03 PROCEDURE — 99285 EMERGENCY DEPT VISIT HI MDM: CPT | Performed by: PHYSICIAN ASSISTANT

## 2021-03-03 PROCEDURE — 96376 TX/PRO/DX INJ SAME DRUG ADON: CPT

## 2021-03-03 PROCEDURE — 85025 COMPLETE CBC W/AUTO DIFF WBC: CPT | Performed by: PHYSICIAN ASSISTANT

## 2021-03-03 RX ORDER — HYDROMORPHONE HCL/PF 1 MG/ML
0.5 SYRINGE (ML) INJECTION ONCE
Status: COMPLETED | OUTPATIENT
Start: 2021-03-03 | End: 2021-03-03

## 2021-03-03 RX ORDER — KETOROLAC TROMETHAMINE 30 MG/ML
15 INJECTION, SOLUTION INTRAMUSCULAR; INTRAVENOUS ONCE
Status: COMPLETED | OUTPATIENT
Start: 2021-03-03 | End: 2021-03-03

## 2021-03-03 RX ORDER — HYDROCODONE BITARTRATE AND ACETAMINOPHEN 5; 325 MG/1; MG/1
1 TABLET ORAL EVERY 6 HOURS PRN
Qty: 10 TABLET | Refills: 0 | Status: SHIPPED | OUTPATIENT
Start: 2021-03-03 | End: 2021-03-17 | Stop reason: ALTCHOICE

## 2021-03-03 RX ORDER — ONDANSETRON 2 MG/ML
4 INJECTION INTRAMUSCULAR; INTRAVENOUS ONCE
Status: COMPLETED | OUTPATIENT
Start: 2021-03-03 | End: 2021-03-03

## 2021-03-03 RX ORDER — POTASSIUM CHLORIDE 20 MEQ/1
20 TABLET, EXTENDED RELEASE ORAL ONCE
Status: COMPLETED | OUTPATIENT
Start: 2021-03-03 | End: 2021-03-03

## 2021-03-03 RX ORDER — METOPROLOL SUCCINATE 50 MG/1
50 TABLET, EXTENDED RELEASE ORAL DAILY
Status: DISCONTINUED | OUTPATIENT
Start: 2021-03-04 | End: 2021-03-03

## 2021-03-03 RX ORDER — HYDROCODONE BITARTRATE AND ACETAMINOPHEN 5; 325 MG/1; MG/1
1 TABLET ORAL EVERY 6 HOURS PRN
Qty: 10 TABLET | Refills: 0 | Status: SHIPPED | OUTPATIENT
Start: 2021-03-03 | End: 2021-03-03 | Stop reason: ALTCHOICE

## 2021-03-03 RX ADMIN — HYDROMORPHONE HYDROCHLORIDE 0.5 MG: 1 INJECTION, SOLUTION INTRAMUSCULAR; INTRAVENOUS; SUBCUTANEOUS at 19:43

## 2021-03-03 RX ADMIN — HYDROMORPHONE HYDROCHLORIDE 0.5 MG: 1 INJECTION, SOLUTION INTRAMUSCULAR; INTRAVENOUS; SUBCUTANEOUS at 20:57

## 2021-03-03 RX ADMIN — SODIUM CHLORIDE 1000 ML: 0.9 INJECTION, SOLUTION INTRAVENOUS at 19:43

## 2021-03-03 RX ADMIN — ONDANSETRON 4 MG: 2 INJECTION INTRAMUSCULAR; INTRAVENOUS at 19:50

## 2021-03-03 RX ADMIN — POTASSIUM CHLORIDE 20 MEQ: 1500 TABLET, EXTENDED RELEASE ORAL at 21:27

## 2021-03-03 RX ADMIN — KETOROLAC TROMETHAMINE 15 MG: 30 INJECTION, SOLUTION INTRAMUSCULAR at 19:46

## 2021-03-03 NOTE — Clinical Note
Magen Molina was seen and treated in our emergency department on 3/3/2021  Diagnosis: Kidney stone    Shelley    She may return on this date: 03/06/2021         If you have any questions or concerns, please don't hesitate to call        Heather Parmar MD    ______________________________           _______________          _______________  Hospital Representative                              Date                                Time

## 2021-03-03 NOTE — Clinical Note
Aliya Evans was seen and treated in our emergency department on 3/3/2021  Diagnosis: Kidney stone    Shelley    She may return on this date: 03/06/2021         If you have any questions or concerns, please don't hesitate to call        Yasmin Dawson MD    ______________________________           _______________          _______________  Hospital Representative                              Date                                Time

## 2021-03-03 NOTE — Clinical Note
Henna Marcelo was seen and treated in our emergency department on 3/3/2021  Diagnosis: Kidney stone    Shelley    She may return on this date: 03/06/2021         If you have any questions or concerns, please don't hesitate to call        Cony Baird MD    ______________________________           _______________          _______________  Hospital Representative                              Date                                Time

## 2021-03-04 NOTE — ED CARE HANDOFF
Emergency Department Sign Out Note        Sign out and transfer of care from [address] [place] [address]  See Separate Emergency Department note  The patient, Camden Otoole, was evaluated by the previous provider for Renal Colic, known Right sided kidney stone  Workup Completed:  Labs, CT    ED Course / Workup Pending (followup):    2100  Received in sign out  Will f/u pt after 2nd dose of Dilaudid     2144  Pt seen and evaluated  Pt feels much much better and is ready to manage from home  She understands return precautions  She will f/u w/ Dr Valdemar Gillis                                     Procedures  MDM    Disposition  Final diagnoses:   Kidney stone     Time reflects when diagnosis was documented in both MDM as applicable and the Disposition within this note     Time User Action Codes Description Comment    3/3/2021  9:04 PM Dearl Decent [N20 0] Kidney stone     3/3/2021  9:05 PM George Hue Add [N20 0] Renal stone     3/3/2021  9:05 PM George Hue Remove [N20 0] Renal stone       ED Disposition     ED Disposition Condition Date/Time Comment    Discharge Stable Wed Mar 3, 2021  9:05 PM [employer] discharge to home/self care  Follow-up Information     Follow up With Specialties Details Why Gael Tobar MD Urology Schedule an appointment as soon as possible for a visit   [address] [place] [address]  [phone]          Patient's Medications   Discharge Prescriptions    No medications on file     No discharge procedures on file         ED Provider  Electronically Signed by     Dipti Caraballo MD  03/03/21 0408

## 2021-03-04 NOTE — ED PROVIDER NOTES
History  Chief Complaint   Patient presents with    Back Pain     pt presents with back pain  pt was recently diagnosed with kidney stones     72-year-old female history of hypertension presents complaining of flank pain  Patient had a CT renal stone study within the past week that demonstrated a 5 mm renal stone with some hydronephrosis  Patient reports that her pain is been worsened over the past 2 days and got considerably worse to developing 10/10 pain this afternoon  Patient was prescribed 10 tablets of tramadol however she presents with a bottle showing that she has only taken 1 pill thus far due to limited relief with her 1st dose  She denies any fevers, chills, urinary burning, pelvic pain or vaginal discharge  She does report nausea but denies vomiting  Prior to Admission Medications   Prescriptions Last Dose Informant Patient Reported? Taking?    Cholecalciferol (VITAMIN D3) 1000 units CAPS  Self Yes No   Sig: Take 1 capsule by mouth daily    EPINEPHrine (EPIPEN) 0 3 mg/0 3 mL SOAJ   No No   Sig: Inject 0 3 mL (0 3 mg total) into a muscle once for 1 dose   hydrochlorothiazide (HYDRODIURIL) 25 mg tablet  Self No No   Sig: Take 1 tablet (25 mg total) by mouth daily   ketorolac (ACULAR) 0 5 % ophthalmic solution  Self Yes No   loratadine (CLARITIN) 10 mg tablet  Self Yes No   Sig: Take 1 tablet by mouth daily   naproxen (EC NAPROSYN) 500 MG EC tablet  Self No No   Sig: Take 1 tablet (500 mg total) by mouth 2 (two) times a day with meals for 14 days   Patient not taking: Reported on 3/1/2021   prednisoLONE acetate (PRED FORTE) 1 % ophthalmic suspension  Self Yes No   rOPINIRole (REQUIP) 1 mg tablet  Self No No   Sig: Take 2 tabs at 7pm   rizatriptan (MAXALT) 10 MG tablet  Self No No   Sig: Take 1 tablet (10 mg total) by mouth once as needed for migraine for up to 1 dose May repeat in 2 hours if needed   tamsulosin (FLOMAX) 0 4 mg   No No   Sig: Take 1 capsule (0 4 mg total) by mouth daily with dinner   traMADol (ULTRAM) 50 mg tablet   No No   Sig: Take 1 tablet (50 mg total) by mouth every 6 (six) hours as needed for moderate pain   vitamin E, tocopherol, 400 units capsule  Self Yes No   Sig: Take 400 Units by mouth daily      Facility-Administered Medications: None       Past Medical History:   Diagnosis Date    Cataract     left eye    Hot flashes     Hypertension     Migraine     menstural migraines    Restless leg syndrome        Past Surgical History:   Procedure Laterality Date    EYE SURGERY      WISDOM TOOTH EXTRACTION Bilateral        Family History   Problem Relation Age of Onset    Hypertension Mother     Thyroid disease Mother     Uterine cancer Mother     Hypertension Father     Diabetes Maternal Grandmother     Alzheimer's disease Maternal Grandmother     Alzheimer's disease Maternal Grandfather     No Known Problems Daughter      I have reviewed and agree with the history as documented  E-Cigarette/Vaping    E-Cigarette Use Never User      E-Cigarette/Vaping Substances     Social History     Tobacco Use    Smoking status: Never Smoker    Smokeless tobacco: Never Used   Substance Use Topics    Alcohol use: Yes     Frequency: 2-4 times a month     Comment: social    Drug use: No       Review of Systems   Constitutional: Negative for chills, fatigue and fever  HENT: Negative for ear pain and sore throat  Eyes: Negative for pain  Respiratory: Negative for cough, shortness of breath and wheezing  Cardiovascular: Negative for chest pain, palpitations and leg swelling  Gastrointestinal: Negative for abdominal pain, constipation, diarrhea, nausea and vomiting  Endocrine: Negative for polyuria  Genitourinary: Positive for flank pain  Negative for dysuria and pelvic pain  Musculoskeletal: Negative for arthralgias, myalgias, neck pain and neck stiffness  Skin: Negative for rash  Neurological: Negative for dizziness, syncope, light-headedness and headaches  All other systems reviewed and are negative  Physical Exam  Physical Exam  Constitutional:       Appearance: She is well-developed  HENT:      Head: Normocephalic and atraumatic  Mouth/Throat:      Pharynx: No oropharyngeal exudate  Neck:      Musculoskeletal: Normal range of motion  Cardiovascular:      Rate and Rhythm: Normal rate and regular rhythm  Heart sounds: Normal heart sounds  Pulmonary:      Effort: Pulmonary effort is normal       Breath sounds: Normal breath sounds  Abdominal:      General: Bowel sounds are normal       Palpations: Abdomen is soft  Tenderness: There is no abdominal tenderness  There is right CVA tenderness and left CVA tenderness  Musculoskeletal: Normal range of motion  Skin:     General: Skin is warm  Capillary Refill: Capillary refill takes less than 2 seconds  Neurological:      General: No focal deficit present  Mental Status: She is alert and oriented to person, place, and time           Vital Signs  ED Triage Vitals [03/03/21 1914]   Temperature Pulse Respirations Blood Pressure SpO2   97 6 °F (36 4 °C) 88 22 (!) 182/78 98 %      Temp Source Heart Rate Source Patient Position - Orthostatic VS BP Location FiO2 (%)   Temporal Monitor -- Left arm --      Pain Score       Worst Possible Pain           Vitals:    03/03/21 1914   BP: (!) 182/78   Pulse: 88         Visual Acuity      ED Medications  Medications   potassium chloride (K-DUR,KLOR-CON) CR tablet 20 mEq (has no administration in time range)   sodium chloride 0 9 % bolus 1,000 mL (1,000 mL Intravenous New Bag 3/3/21 1943)   HYDROmorphone (DILAUDID) injection 0 5 mg (0 5 mg Intravenous Given 3/3/21 1943)   ketorolac (TORADOL) injection 15 mg (15 mg Intravenous Given 3/3/21 1946)   ondansetron (ZOFRAN) injection 4 mg (4 mg Intravenous Given 3/3/21 1950)   HYDROmorphone (DILAUDID) injection 0 5 mg (0 5 mg Intravenous Given 3/3/21 2057)       Diagnostic Studies  Results Reviewed Procedure Component Value Units Date/Time    Basic metabolic panel [[id number]]  (Abnormal) Collected: 03/03/21 1936    Lab Status: Final result Specimen: Blood from Arm, Right Updated: 03/03/21 2054     Sodium 140 mmol/L      Potassium 3 4 mmol/L      Chloride 100 mmol/L      CO2 29 mmol/L      ANION GAP 11 mmol/L      BUN 11 mg/dL      Creatinine 0 74 mg/dL      Glucose 111 mg/dL      Calcium 10 2 mg/dL      eGFR 103 ml/min/1 73sq m     Narrative:      [place] guidelines for Chronic Kidney Disease (CKD):     Stage 1 with normal or high GFR (GFR > 90 mL/min/1 73 square meters)    Stage 2 Mild CKD (GFR = 60-89 mL/min/1 73 square meters)    Stage 3A Moderate CKD (GFR = 45-59 mL/min/1 73 square meters)    Stage 3B Moderate CKD (GFR = 30-44 mL/min/1 73 square meters)    Stage 4 Severe CKD (GFR = 15-29 mL/min/1 73 square meters)    Stage 5 End Stage CKD (GFR <15 mL/min/1 73 square meters)  Note: GFR calculation is accurate only with a steady state creatinine    Urine Microscopic [[id number]]  (Normal) Collected: 03/03/21 1938    Lab Status: Final result Specimen: Urine, Clean Catch Updated: 03/03/21 1950     RBC, UA 1-2 /hpf      WBC, UA 0-1 /hpf      Epithelial Cells Occasional /hpf      Bacteria, UA None Seen /hpf     UA w Reflex to Microscopic w Reflex to Culture [[id number]]  (Abnormal) Collected: 03/03/21 1938    Lab Status: Final result Specimen: Urine, Clean Catch Updated: 03/03/21 1948     Color, UA Yellow     Clarity, UA Clear     Specific Gravity, UA <=1 005     pH, UA 7 0     Leukocytes, UA Negative     Nitrite, UA Negative     Protein, UA Negative mg/dl      Glucose, UA Negative mg/dl      Ketones, UA Negative mg/dl      Urobilinogen, UA 0 2 E U /dl      Bilirubin, UA Negative     Blood, UA 2+    CBC and differential [[id number]]  (Normal) Collected: 03/03/21 1936    Lab Status: Final result Specimen: Blood from Arm, Right Updated: 03/03/21 1943     WBC 9 50 Thousand/uL      RBC 4 99 Million/uL      Hemoglobin 14 4 g/dL      Hematocrit 42 7 %      MCV 86 fL      MCH 28 9 pg      MCHC 33 8 g/dL      RDW 12 6 %      MPV 10 1 fL      Platelets 096 Thousands/uL      Neutrophils Relative 58 %      Lymphocytes Relative 32 %      Monocytes Relative 7 %      Eosinophils Relative 2 %      Basophils Relative 1 %      Neutrophils Absolute 5 60 Thousands/µL      Lymphocytes Absolute 3 00 Thousands/µL      Monocytes Absolute 0 70 Thousand/µL      Eosinophils Absolute 0 20 Thousand/µL      Basophils Absolute 0 10 Thousands/µL     POCT pregnancy, urine [[id number]]  (Normal) Resulted: 03/03/21 1939    Lab Status: Final result Updated: 03/03/21 1940     EXT PREG TEST UR (Ref: Negative) negative     Control valid                 CT renal stone study abdomen pelvis wo contrast   Final Result by Mary Almaguer MD (03/03 2045)      7 mm left ureterovesical junction obstructing calculus causing mild left hydroureteronephrosis  Scattered other subcentimeter left renal calculi  Workstation performed: [id number]                    Procedures  Procedures         ED Course  ED Course as of Mar 03 2111   Wed Mar 03, 2021   2108   Discussed case with on-call urologist Dr Eli Rivera  He personally viewed the CT images and believes that the patient's pain is coming from the stone traveling to the UPJ region  Mild hydronephrosis but he believes the patient can pass the stone with adequate pain control  Upon re-evaluation of the patient, she is feeling better but still having 7/10 pain  Will give another dose of Dilaudid and sign out patient to Dr Danya Blood at 2100 with likely plan to d/c on pain control with uro follow up  Patient offered admission and transfer(no OR this week) but she declined stating she would prefer to go home                                  SBIRT 20yo+      Most Recent Value   SBIRT (24 yo +)   In order to provide better care to our patients, we are screening all of our patients for alcohol and drug use  Would it be okay to ask you these screening questions? Yes Filed at: 03/03/2021 1948   Initial Alcohol Screen: US AUDIT-C    1  How often do you have a drink containing alcohol? 2 Filed at: 03/03/2021 1948   2  How many drinks containing alcohol do you have on a typical day you are drinking? 1 Filed at: 03/03/2021 1948   3a  Male UNDER 65: How often do you have five or more drinks on one occasion? 0 Filed at: 03/03/2021 1948   3b  FEMALE Any Age, or MALE 65+: How often do you have 4 or more drinks on one occassion? 1 Filed at: 03/03/2021 1948   Audit-C Score  4 Filed at: 03/03/2021 1948   REAGAN: How many times in the past year have you    Used an illegal drug or used a prescription medication for non-medical reasons? Never Filed at: 03/03/2021 1948                    MDM    Disposition  Final diagnoses:   Kidney stone     Time reflects when diagnosis was documented in both MDM as applicable and the Disposition within this note     Time User Action Codes Description Comment    3/3/2021  9:04 PM Jimmy Alamin [N20 0] Kidney stone     3/3/2021  9:05 PM Charismajudith Rubin Add [N20 0] Renal stone     3/3/2021  9:05 PM Charismajudith Rubin Remove [N20 0] Renal stone       ED Disposition     ED Disposition Condition Date/Time Comment    Discharge Stable Wed Mar 3, 2021  9:05 PM Xuan Waller discharge to home/self care  Follow-up Information     Follow up With Specialties Details Why Danae Bhatt MD Urology Schedule an appointment as soon as possible for a visit   [address] [place] [address]  [phone]            Patient's Medications   Discharge Prescriptions    No medications on file     No discharge procedures on file      PDMP Review       Value Time User    PDMP Reviewed  Yes 11/6/2020  2:05 PM Dorothy Forbes          ED Provider  Electronically Signed by           Catalino Newell PA-C  03/03/21 2111

## 2021-03-04 NOTE — DISCHARGE INSTRUCTIONS
Continue to take Flomax as previously prescribed  Continue to strain your urine  Follow-up with urology ASAP  Return to the emergency department IMMEDIATELY if:  You develop any fevers or Flu like symptoms,  Or any new or worsening symptoms or if your pain cannot be well controlled      USE EXTREME CAUTION WHILE TAKING NARCOTICS FOR PAIN  ONLY TAKE FOR ACUTE PAIN  NEVER TAKE WITH OTHER SEDATIVE MEDICATIONS OR SUBSTANCES, SUCH AS SLEEPING MEDICATIONS OR ALCOHOL OR OTHER SUCH SUBSTANCES  YOU MAY NEED TO TAKE LAXATIVES WHILE TAKING THIS MEDICATION  PLEASE DISCUSS THE ABOVE WITH YOUR FAMILY DOCTOR

## 2021-03-05 ENCOUNTER — TELEPHONE (OUTPATIENT)
Dept: UROLOGY | Facility: AMBULATORY SURGERY CENTER | Age: 41
End: 2021-03-05

## 2021-03-05 NOTE — TELEPHONE ENCOUNTER
Patient was seen in emergency room on 3/3/21 and stated she has not passed the stone  She would like to know if she needs surgical intervention  Please advise

## 2021-03-05 NOTE — TELEPHONE ENCOUNTER
Patient seen 3/1/21 by Daniel Miguel for CT scan f/u of 5mm right UVJ stone  Plan was for medical expulsive therapy and repeat imaging in 10-14 days  Patient to ER 3/3/21 for 10/10 flank pain  Dr Karishma Balderas consulted as patient went to [address] [place] [address] ER  She declined admission and transfer and was discharged home  Please review and advise

## 2021-03-08 ENCOUNTER — HOSPITAL ENCOUNTER (EMERGENCY)
Facility: HOSPITAL | Age: 41
Discharge: OTHER INSTITUTION | End: 2021-03-08
Attending: EMERGENCY MEDICINE | Admitting: EMERGENCY MEDICINE
Payer: COMMERCIAL

## 2021-03-08 ENCOUNTER — HOSPITAL ENCOUNTER (INPATIENT)
Facility: HOSPITAL | Age: 41
LOS: 1 days | Discharge: HOME | DRG: 661 | End: 2021-03-09
Attending: FAMILY MEDICINE | Admitting: FAMILY MEDICINE
Payer: COMMERCIAL

## 2021-03-08 VITALS
WEIGHT: 131 LBS | RESPIRATION RATE: 18 BRPM | HEIGHT: 60 IN | DIASTOLIC BLOOD PRESSURE: 70 MMHG | HEART RATE: 62 BPM | SYSTOLIC BLOOD PRESSURE: 132 MMHG | TEMPERATURE: 97.3 F | BODY MASS INDEX: 25.72 KG/M2 | OXYGEN SATURATION: 99 %

## 2021-03-08 DIAGNOSIS — N23 RENAL COLIC: Primary | ICD-10-CM

## 2021-03-08 DIAGNOSIS — E87.6 HYPOKALEMIA: ICD-10-CM

## 2021-03-08 DIAGNOSIS — N20.1 LEFT URETERAL CALCULUS: Primary | ICD-10-CM

## 2021-03-08 DIAGNOSIS — N20.1 URETERAL CALCULI: Primary | ICD-10-CM

## 2021-03-08 LAB
ALBUMIN SERPL BCP-MCNC: 4.8 G/DL (ref 3.5–5.7)
ALP SERPL-CCNC: 49 U/L (ref 40–150)
ALT SERPL W P-5'-P-CCNC: 14 U/L (ref 7–52)
ANION GAP SERPL CALCULATED.3IONS-SCNC: 9 MMOL/L (ref 4–13)
APTT PPP: 29 SECONDS (ref 23–37)
AST SERPL W P-5'-P-CCNC: 15 U/L (ref 13–39)
BASOPHILS # BLD AUTO: 0.1 THOUSANDS/ΜL (ref 0–0.1)
BASOPHILS NFR BLD AUTO: 1 % (ref 0–2)
BILIRUB SERPL-MCNC: 0.4 MG/DL (ref 0.2–1)
BILIRUB UR QL STRIP: NEGATIVE
BUN SERPL-MCNC: 18 MG/DL (ref 7–25)
CALCIUM SERPL-MCNC: 10.3 MG/DL (ref 8.6–10.5)
CHLORIDE SERPL-SCNC: 99 MMOL/L (ref 98–107)
CLARITY UR: CLEAR
CO2 SERPL-SCNC: 31 MMOL/L (ref 21–31)
COLOR UR: ABNORMAL
CREAT SERPL-MCNC: 1.1 MG/DL (ref 0.6–1.2)
EOSINOPHIL # BLD AUTO: 0.2 THOUSAND/ΜL (ref 0–0.61)
EOSINOPHIL NFR BLD AUTO: 2 % (ref 0–5)
ERYTHROCYTE [DISTWIDTH] IN BLOOD BY AUTOMATED COUNT: 12.5 % (ref 11.5–14.5)
EXT PREG TEST URINE: NEGATIVE
EXT. CONTROL ED NAV: NORMAL
GFR SERPL CREATININE-BSD FRML MDRD: 64 ML/MIN/1.73SQ M
GLUCOSE SERPL-MCNC: 92 MG/DL (ref 65–99)
GLUCOSE UR STRIP-MCNC: NEGATIVE MG/DL
HCT VFR BLD AUTO: 43.2 % (ref 42–47)
HGB BLD-MCNC: 14.7 G/DL (ref 12–16)
HGB UR QL STRIP.AUTO: NEGATIVE
INR PPP: 0.99 (ref 0.84–1.19)
KETONES UR STRIP-MCNC: NEGATIVE MG/DL
LEUKOCYTE ESTERASE UR QL STRIP: NEGATIVE
LYMPHOCYTES # BLD AUTO: 2.7 THOUSANDS/ΜL (ref 0.6–4.47)
LYMPHOCYTES NFR BLD AUTO: 29 % (ref 21–51)
MCH RBC QN AUTO: 29.3 PG (ref 26–34)
MCHC RBC AUTO-ENTMCNC: 34.1 G/DL (ref 31–37)
MCV RBC AUTO: 86 FL (ref 81–99)
MONOCYTES # BLD AUTO: 0.6 THOUSAND/ΜL (ref 0.17–1.22)
MONOCYTES NFR BLD AUTO: 6 % (ref 2–12)
NEUTROPHILS # BLD AUTO: 6 THOUSANDS/ΜL (ref 1.4–6.5)
NEUTS SEG NFR BLD AUTO: 63 % (ref 42–75)
NITRITE UR QL STRIP: NEGATIVE
PH UR STRIP.AUTO: 6 [PH]
PLATELET # BLD AUTO: 257 THOUSANDS/UL (ref 149–390)
PMV BLD AUTO: 10 FL (ref 8.6–11.7)
POTASSIUM SERPL-SCNC: 3.3 MMOL/L (ref 3.5–5.5)
PROT SERPL-MCNC: 7.6 G/DL (ref 6.4–8.9)
PROT UR STRIP-MCNC: NEGATIVE MG/DL
PROTHROMBIN TIME: 13 SECONDS (ref 11.6–14.5)
RBC # BLD AUTO: 5.04 MILLION/UL (ref 3.9–5.2)
SODIUM SERPL-SCNC: 139 MMOL/L (ref 134–143)
SP GR UR STRIP.AUTO: <=1.005 (ref 1–1.03)
UROBILINOGEN UR QL STRIP.AUTO: 0.2 E.U./DL
WBC # BLD AUTO: 9.6 THOUSAND/UL (ref 4.8–10.8)

## 2021-03-08 PROCEDURE — 96374 THER/PROPH/DIAG INJ IV PUSH: CPT

## 2021-03-08 PROCEDURE — 81003 URINALYSIS AUTO W/O SCOPE: CPT | Performed by: EMERGENCY MEDICINE

## 2021-03-08 PROCEDURE — G0379 DIRECT REFER HOSPITAL OBSERV: HCPCS

## 2021-03-08 PROCEDURE — 99221 1ST HOSP IP/OBS SF/LOW 40: CPT | Performed by: FAMILY MEDICINE

## 2021-03-08 PROCEDURE — 85610 PROTHROMBIN TIME: CPT | Performed by: EMERGENCY MEDICINE

## 2021-03-08 PROCEDURE — 96375 TX/PRO/DX INJ NEW DRUG ADDON: CPT

## 2021-03-08 PROCEDURE — 81025 URINE PREGNANCY TEST: CPT | Performed by: EMERGENCY MEDICINE

## 2021-03-08 PROCEDURE — 80053 COMPREHEN METABOLIC PANEL: CPT | Performed by: EMERGENCY MEDICINE

## 2021-03-08 PROCEDURE — 85025 COMPLETE CBC W/AUTO DIFF WBC: CPT | Performed by: EMERGENCY MEDICINE

## 2021-03-08 PROCEDURE — 85730 THROMBOPLASTIN TIME PARTIAL: CPT | Performed by: EMERGENCY MEDICINE

## 2021-03-08 PROCEDURE — 99285 EMERGENCY DEPT VISIT HI MDM: CPT | Performed by: EMERGENCY MEDICINE

## 2021-03-08 PROCEDURE — 36415 COLL VENOUS BLD VENIPUNCTURE: CPT | Performed by: EMERGENCY MEDICINE

## 2021-03-08 PROCEDURE — 99285 EMERGENCY DEPT VISIT HI MDM: CPT

## 2021-03-08 RX ORDER — ONDANSETRON 2 MG/ML
4 INJECTION INTRAMUSCULAR; INTRAVENOUS EVERY 6 HOURS PRN
Status: DISCONTINUED | OUTPATIENT
Start: 2021-03-08 | End: 2021-03-09 | Stop reason: HOSPADM

## 2021-03-08 RX ORDER — ROPINIROLE 1 MG/1
2 TABLET, FILM COATED ORAL
Status: DISCONTINUED | OUTPATIENT
Start: 2021-03-08 | End: 2021-03-09 | Stop reason: HOSPADM

## 2021-03-08 RX ORDER — OXYCODONE HYDROCHLORIDE 10 MG/1
10 TABLET ORAL EVERY 4 HOURS PRN
Status: DISCONTINUED | OUTPATIENT
Start: 2021-03-08 | End: 2021-03-09 | Stop reason: HOSPADM

## 2021-03-08 RX ORDER — OXYCODONE HYDROCHLORIDE 5 MG/1
5 TABLET ORAL EVERY 4 HOURS PRN
Status: DISCONTINUED | OUTPATIENT
Start: 2021-03-08 | End: 2021-03-09 | Stop reason: HOSPADM

## 2021-03-08 RX ORDER — POTASSIUM CHLORIDE 20 MEQ/1
40 TABLET, EXTENDED RELEASE ORAL ONCE
Status: COMPLETED | OUTPATIENT
Start: 2021-03-08 | End: 2021-03-08

## 2021-03-08 RX ORDER — ACETAMINOPHEN 325 MG/1
650 TABLET ORAL EVERY 6 HOURS PRN
Status: DISCONTINUED | OUTPATIENT
Start: 2021-03-08 | End: 2021-03-09 | Stop reason: HOSPADM

## 2021-03-08 RX ORDER — SODIUM CHLORIDE 9 MG/ML
50 INJECTION, SOLUTION INTRAVENOUS CONTINUOUS
Status: DISPENSED | OUTPATIENT
Start: 2021-03-09 | End: 2021-03-09

## 2021-03-08 RX ORDER — CIPROFLOXACIN 2 MG/ML
400 INJECTION, SOLUTION INTRAVENOUS ONCE
Status: CANCELLED | OUTPATIENT
Start: 2021-03-08 | End: 2021-03-08

## 2021-03-08 RX ORDER — TAMSULOSIN HYDROCHLORIDE 0.4 MG/1
0.4 CAPSULE ORAL
Status: DISCONTINUED | OUTPATIENT
Start: 2021-03-08 | End: 2021-03-09 | Stop reason: HOSPADM

## 2021-03-08 RX ORDER — HYDROMORPHONE HCL/PF 1 MG/ML
0.5 SYRINGE (ML) INJECTION
Status: DISCONTINUED | OUTPATIENT
Start: 2021-03-08 | End: 2021-03-09 | Stop reason: HOSPADM

## 2021-03-08 RX ORDER — TAMSULOSIN HYDROCHLORIDE 0.4 MG/1
0.4 CAPSULE ORAL
Status: DISCONTINUED | OUTPATIENT
Start: 2021-03-09 | End: 2021-03-08

## 2021-03-08 RX ORDER — ACETAMINOPHEN 325 MG/1
650 TABLET ORAL EVERY 6 HOURS PRN
Status: DISCONTINUED | OUTPATIENT
Start: 2021-03-08 | End: 2021-03-08

## 2021-03-08 RX ORDER — HYDROCHLOROTHIAZIDE 25 MG/1
25 TABLET ORAL DAILY
Status: DISCONTINUED | OUTPATIENT
Start: 2021-03-09 | End: 2021-03-09 | Stop reason: HOSPADM

## 2021-03-08 RX ORDER — KETOROLAC TROMETHAMINE 30 MG/ML
15 INJECTION, SOLUTION INTRAMUSCULAR; INTRAVENOUS ONCE
Status: COMPLETED | OUTPATIENT
Start: 2021-03-08 | End: 2021-03-08

## 2021-03-08 RX ORDER — LORATADINE 10 MG/1
10 TABLET ORAL DAILY
Status: DISCONTINUED | OUTPATIENT
Start: 2021-03-09 | End: 2021-03-09 | Stop reason: HOSPADM

## 2021-03-08 RX ORDER — DIPHENHYDRAMINE HYDROCHLORIDE 50 MG/ML
25 INJECTION INTRAMUSCULAR; INTRAVENOUS EVERY 6 HOURS PRN
Status: DISCONTINUED | OUTPATIENT
Start: 2021-03-08 | End: 2021-03-09 | Stop reason: HOSPADM

## 2021-03-08 RX ADMIN — SODIUM CHLORIDE 50 ML/HR: 0.9 INJECTION, SOLUTION INTRAVENOUS at 23:25

## 2021-03-08 RX ADMIN — ROPINIROLE HYDROCHLORIDE 2 MG: 1 TABLET, FILM COATED ORAL at 22:47

## 2021-03-08 RX ADMIN — OXYCODONE HYDROCHLORIDE 10 MG: 10 TABLET ORAL at 22:33

## 2021-03-08 RX ADMIN — KETOROLAC TROMETHAMINE 15 MG: 30 INJECTION, SOLUTION INTRAMUSCULAR; INTRAVENOUS at 20:35

## 2021-03-08 RX ADMIN — MORPHINE SULFATE 2 MG: 2 INJECTION, SOLUTION INTRAMUSCULAR; INTRAVENOUS at 18:41

## 2021-03-08 RX ADMIN — POTASSIUM CHLORIDE 40 MEQ: 1500 TABLET, EXTENDED RELEASE ORAL at 19:16

## 2021-03-08 RX ADMIN — TAMSULOSIN HYDROCHLORIDE 0.4 MG: 0.4 CAPSULE ORAL at 22:47

## 2021-03-08 NOTE — TELEPHONE ENCOUNTER
Called and left detailed message per communication consent  Advised of provider message: Earline Thomas PA-C    9:48 AM  Note     If symptoms controlled at home would allow 2 more weeks for attempt passage  Will f/u her KUB and US results from later this week to reassess obstruction  If persists, will request OR        Asking for a call back to discuss, number provided  When patient returns call please inquire if her pain is better controlled  If it is will continue with repeat imaging as scheduled  Otherwise if not well controlled providers can request OR case

## 2021-03-08 NOTE — TELEPHONE ENCOUNTER
Patient called in and advised that she is having to much pain still and wanted to know what she is going to get a call to schedule for surgery  I advised the patient that they are working on the process  She advised that she is going to go to the ED if the pain is still bad and see if they can just do emergency surgery there  FYI  Please advise

## 2021-03-08 NOTE — TELEPHONE ENCOUNTER
Called and spoke with patient at this time  Reviewed I spoke with providers and since she has been out of work and in pain, case request will be placed for surgery  Our surgery schedulers will then be in contact to assist scheduling ureteroscopy  She is agreeable to this  She reports she has been having constant pain the last couple days and intermittent nausea  She reports she does not even feel up to driving herself  Advised not unreasonable to also go back to ER if needed for strong pain, N/V, or fever  She verbalized understanding

## 2021-03-08 NOTE — TELEPHONE ENCOUNTER
Patient returned call  Still has a lot of pain  Has been out of work and is quite uncomfortable    Please call at [phone]

## 2021-03-08 NOTE — TELEPHONE ENCOUNTER
If symptoms controlled at home would allow 2 more weeks for attempt passage  Will f/u her KUB and US results from later this week to reassess obstruction   If persists, will request OR

## 2021-03-08 NOTE — ED PROVIDER NOTES
History  Chief Complaint   Patient presents with    Flank Pain     Pt reports left sided flank pain into LLQ  This episode started Friday 25     80-year-old female presents emergency department complaining of left lower quadrant abdominal pain due to a kidney stone  Patient was diagnosed with a large left-sided stone with hydronephrosis  She has seen a urologist in [place] due to a cancellation in the scheduled and was going to be set up for surgery in [facility], however the patient lives close to this hospital can no longer stand the discomfort  She was also supposed to see Dr Arcadio Crenshaw in March  Patient is here requesting surgery to have her stone removed because she is just about out of pain medicine and the medicine isn't helping her  Prior to Admission Medications   Prescriptions Last Dose Informant Patient Reported? Taking?    Cholecalciferol (VITAMIN D3) 1000 units CAPS  Self Yes No   Sig: Take 1 capsule by mouth daily    EPINEPHrine (EPIPEN) 0 3 mg/0 3 mL SOAJ   No No   Sig: Inject 0 3 mL (0 3 mg total) into a muscle once for 1 dose   HYDROcodone-acetaminophen (NORCO) 5-325 mg per tablet   No No   Sig: Take 1 tablet by mouth every 6 (six) hours as needed for pain for up to 10 dosesMax Daily Amount: 4 tablets   hydrochlorothiazide (HYDRODIURIL) 25 mg tablet  Self No No   Sig: Take 1 tablet (25 mg total) by mouth daily   ketorolac (ACULAR) 0 5 % ophthalmic solution  Self Yes No   loratadine (CLARITIN) 10 mg tablet  Self Yes No   Sig: Take 1 tablet by mouth daily   naproxen (EC NAPROSYN) 500 MG EC tablet  Self No No   Sig: Take 1 tablet (500 mg total) by mouth 2 (two) times a day with meals for 14 days   Patient not taking: Reported on 3/1/2021   prednisoLONE acetate (PRED FORTE) 1 % ophthalmic suspension  Self Yes No   rOPINIRole (REQUIP) 1 mg tablet  Self No No   Sig: Take 2 tabs at 7pm   rizatriptan (MAXALT) 10 MG tablet  Self No No   Sig: Take 1 tablet (10 mg total) by mouth once as needed for migraine for up to 1 dose May repeat in 2 hours if needed   tamsulosin (FLOMAX) 0 4 mg   No No   Sig: Take 1 capsule (0 4 mg total) by mouth daily with dinner   traMADol (ULTRAM) 50 mg tablet   No No   Sig: Take 1 tablet (50 mg total) by mouth every 6 (six) hours as needed for moderate pain   vitamin E, tocopherol, 400 units capsule  Self Yes No   Sig: Take 400 Units by mouth daily      Facility-Administered Medications: None       Past Medical History:   Diagnosis Date    Cataract     left eye    Hot flashes     Hypertension     Kidney stone     Migraine     menstural migraines    Restless leg syndrome        Past Surgical History:   Procedure Laterality Date    EYE SURGERY      WISDOM TOOTH EXTRACTION Bilateral        Family History   Problem Relation Age of Onset    Hypertension Mother     Thyroid disease Mother     Uterine cancer Mother     Hypertension Father     Diabetes Maternal Grandmother     Alzheimer's disease Maternal Grandmother     Alzheimer's disease Maternal Grandfather     No Known Problems Daughter      I have reviewed and agree with the history as documented  E-Cigarette/Vaping    E-Cigarette Use Never User      E-Cigarette/Vaping Substances     Social History     Tobacco Use    Smoking status: Never Smoker    Smokeless tobacco: Never Used   Substance Use Topics    Alcohol use: Yes     Frequency: 2-4 times a month     Comment: social    Drug use: No       Review of Systems   Constitutional: Positive for activity change  Respiratory: Negative for chest tightness  Gastrointestinal: Positive for abdominal pain and nausea  Negative for vomiting  Genitourinary: Positive for flank pain, hematuria and pelvic pain  Negative for dysuria, vaginal bleeding and vaginal discharge  Musculoskeletal: Positive for back pain  Neurological: Negative for dizziness  Psychiatric/Behavioral: Negative for behavioral problems         Physical Exam  Physical Exam  Vitals signs and nursing note reviewed  Constitutional:       General: She is in acute distress  Appearance: She is well-developed  She is not ill-appearing  HENT:      Head: Normocephalic and atraumatic  Mouth/Throat:      Mouth: Mucous membranes are moist    Eyes:      Conjunctiva/sclera: Conjunctivae normal    Neck:      Musculoskeletal: Neck supple  Cardiovascular:      Rate and Rhythm: Normal rate and regular rhythm  Pulses: Normal pulses  Heart sounds: No murmur  Pulmonary:      Effort: Pulmonary effort is normal  No respiratory distress  Breath sounds: Normal breath sounds  Abdominal:      General: Bowel sounds are normal       Palpations: Abdomen is soft  Tenderness: There is abdominal tenderness  There is left CVA tenderness  Musculoskeletal: Normal range of motion  Skin:     General: Skin is warm and dry  Capillary Refill: Capillary refill takes less than 2 seconds  Neurological:      General: No focal deficit present  Mental Status: She is alert and oriented to person, place, and time           Vital Signs  ED Triage Vitals   Temperature Pulse Respirations Blood Pressure SpO2   03/08/21 1749 03/08/21 1749 03/08/21 1749 03/08/21 1753 03/08/21 1749   (!) 97 3 °F (36 3 °C) 66 18 141/87 100 %      Temp Source Heart Rate Source Patient Position - Orthostatic VS BP Location FiO2 (%)   03/08/21 1749 03/08/21 2045 -- -- --   Temporal Monitor         Pain Score       03/08/21 1749       Worst Possible Pain           Vitals:    03/08/21 1749 03/08/21 1753 03/08/21 2045   BP:  141/87 132/70   Pulse: 66  62         Visual Acuity      ED Medications  Medications   morphine injection 2 mg (2 mg Intravenous Given 3/8/21 1841)   potassium chloride (K-DUR,KLOR-CON) CR tablet 40 mEq (40 mEq Oral Given 3/8/21 1916)   ketorolac (TORADOL) injection 15 mg (15 mg Intravenous Given 3/8/21 2035)       Diagnostic Studies  Results Reviewed     Procedure Component Value Units Date/Time Comprehensive metabolic panel [[id number]]  (Abnormal) Collected: 03/08/21 1836    Lab Status: Final result Specimen: Blood from Arm, Right Updated: 03/08/21 1859     Sodium 139 mmol/L      Potassium 3 3 mmol/L      Chloride 99 mmol/L      CO2 31 mmol/L      ANION GAP 9 mmol/L      BUN 18 mg/dL      Creatinine 1 10 mg/dL      Glucose 92 mg/dL      Calcium 10 3 mg/dL      AST 15 U/L      ALT 14 U/L      Alkaline Phosphatase 49 U/L      Total Protein 7 6 g/dL      Albumin 4 8 g/dL      Total Bilirubin 0 40 mg/dL      eGFR 64 ml/min/1 73sq m     Narrative:      [place] guidelines for Chronic Kidney Disease (CKD):     Stage 1 with normal or high GFR (GFR > 90 mL/min/1 73 square meters)    Stage 2 Mild CKD (GFR = 60-89 mL/min/1 73 square meters)    Stage 3A Moderate CKD (GFR = 45-59 mL/min/1 73 square meters)    Stage 3B Moderate CKD (GFR = 30-44 mL/min/1 73 square meters)    Stage 4 Severe CKD (GFR = 15-29 mL/min/1 73 square meters)    Stage 5 End Stage CKD (GFR <15 mL/min/1 73 square meters)  Note: GFR calculation is accurate only with a steady state creatinine    UA w Reflex to Microscopic w Reflex to Culture [[id number]]  (Abnormal) Collected: 03/08/21 1845    Lab Status: Final result Specimen: Urine, Clean Catch Updated: 03/08/21 1854     Color, UA Straw     Clarity, UA Clear     Specific Gravity, UA <=1 005     pH, UA 6 0     Leukocytes, UA Negative     Nitrite, UA Negative     Protein, UA Negative mg/dl      Glucose, UA Negative mg/dl      Ketones, UA Negative mg/dl      Urobilinogen, UA 0 2 E U /dl      Bilirubin, UA Negative     Blood, UA Negative    Protime-INR [[id number]]  (Normal) Collected: 03/08/21 1836    Lab Status: Final result Specimen: Blood from Arm, Right Updated: 03/08/21 1853     Protime 13 0 seconds      INR 0 99    APTT [[id number]]  (Normal) Collected: 03/08/21 1836    Lab Status: Final result Specimen: Blood from Arm, Right Updated: 03/08/21 1853     PTT 29 seconds CBC and differential [[id number]]  (Normal) Collected: 03/08/21 1836    Lab Status: Final result Specimen: Blood from Arm, Right Updated: 03/08/21 1843     WBC 9 60 Thousand/uL      RBC 5 04 Million/uL      Hemoglobin 14 7 g/dL      Hematocrit 43 2 %      MCV 86 fL      MCH 29 3 pg      MCHC 34 1 g/dL      RDW 12 5 %      MPV 10 0 fL      Platelets 401 Thousands/uL      Neutrophils Relative 63 %      Lymphocytes Relative 29 %      Monocytes Relative 6 %      Eosinophils Relative 2 %      Basophils Relative 1 %      Neutrophils Absolute 6 00 Thousands/µL      Lymphocytes Absolute 2 70 Thousands/µL      Monocytes Absolute 0 60 Thousand/µL      Eosinophils Absolute 0 20 Thousand/µL      Basophils Absolute 0 10 Thousands/µL     POCT pregnancy, urine [[id number]]  (Normal) Resulted: 03/08/21 1840    Lab Status: Final result Updated: 03/08/21 1840     EXT PREG TEST UR (Ref: Negative) negative     Control valid                 No orders to display              Procedures  Procedures         ED Course  ED Course as of Mar 08 2052   Mon Mar 08, 2021   1942 Discussed case with [facility] in [facility]    They said that Dr Waqar Alberto can do her case in the [address] [place] [address] 15 Patient is however refusing ambulance transfer, so we will look to get an accepting [facility] physician who will accept her as a direct admission      2008 Case discussed with Dr Mira Vasquez who accept the patient to the 8th floor of [address] [place] [address]    Disposition  Final diagnoses:   Renal colic   Hypokalemia     Time reflects when diagnosis was documented in both MDM as applicable and the Disposition within this note     Time User Action Codes Description Comment    3/8/2021  8:52 PM Obed Henriquez Add [D41] Renal colic     0/5/0639  5:25 PM Lei Edwards Add [E87 6] Hypokalemia       ED Disposition     ED Disposition Condition Date/Time Comment    Transfer to Another [employer] Mon Mar 8, 2021  8:04 PM Peyman Ruelas should be transferred out to [facility]        MD Documentation      Most Recent Value   Patient Condition  The patient has been stabilized such that within reasonable medical probability, no material deterioration of the patient condition or the condition of the unborn child(karmen) is likely to result from the transfer, No noted underlying medical condition requiring transfer to another facility   Transfer is per preference and request of patient and/or family   Reason for Transfer  No bed available at level of patient's needs, Level of Care needed not available at this facility   Benefits of Transfer  Continuity of care   Risks of Transfer  Potential for delay in receiving treatment, Potential deterioration of medical condition, Loss of IV, Increased discomfort during transfer, Possible worsening of condition or death during transfer   Accepting Physician  [address] Name, Mari Zurita    (Name & Tel number)  PAC   Transported by (Company and Unit #)  Private vehicle   Sending MD  [employer]   Provider Certification  General risk, such as traffic hazards, adverse weather conditions, rough terrain or turbulence, possible failure of equipment (including vehicle or aircraft), or consequences of actions of persons outside the control of the transport personnel, Risk of worsening condition      RN Documentation      [address] Name, Mari Zurita    (Name & Tel number)  PAC   Transported by [employer] and Unit #)  Private vehicle      Follow-up Information    None         Discharge Medication List as of 3/8/2021  8:50 PM      CONTINUE these medications which have NOT CHANGED    Details   Cholecalciferol (VITAMIN D3) 1000 units CAPS Take 1 capsule by mouth daily , Historical Med      EPINEPHrine (EPIPEN) 0 3 mg/0 3 mL SOAJ Inject 0 3 mL (0 3 mg total) into a muscle once for 1 dose, Starting Wed 4/15/2020, Normal      hydrochlorothiazide (HYDRODIURIL) 25 mg tablet Take 1 tablet (25 mg total) by mouth daily, Starting Mon 2/22/2021, Normal      HYDROcodone-acetaminophen (NORCO) 5-325 mg per tablet Take 1 tablet by mouth every 6 (six) hours as needed for pain for up to 10 dosesMax Daily Amount: 4 tablets, Starting Wed 3/3/2021, Normal      ketorolac (ACULAR) 0 5 % ophthalmic solution Starting Mon 9/21/2020, Historical Med      loratadine (CLARITIN) 10 mg tablet Take 1 tablet by mouth daily, Historical Med      naproxen (EC NAPROSYN) 500 MG EC tablet Take 1 tablet (500 mg total) by mouth 2 (two) times a day with meals for 14 days, Starting Mon 2/22/2021, Until Mon 3/8/2021, Normal      prednisoLONE acetate (PRED FORTE) 1 % ophthalmic suspension Starting Mon 9/21/2020, Historical Med      rizatriptan (MAXALT) 10 MG tablet Take 1 tablet (10 mg total) by mouth once as needed for migraine for up to 1 dose May repeat in 2 hours if needed, Starting Fri 11/6/2020, Normal      rOPINIRole (REQUIP) 1 mg tablet Take 2 tabs at 7pm, Normal      tamsulosin (FLOMAX) 0 4 mg Take 1 capsule (0 4 mg total) by mouth daily with dinner, Starting Mon 3/1/2021, Normal      traMADol (ULTRAM) 50 mg tablet Take 1 tablet (50 mg total) by mouth every 6 (six) hours as needed for moderate pain, Starting Mon 3/1/2021, Normal      vitamin E, tocopherol, 400 units capsule Take 400 Units by mouth daily, Historical Med           No discharge procedures on file      PDMP Review       Value Time User    PDMP Reviewed  Yes 11/6/2020  2:05 PM Gracia Ochoa, [address] [place] [address] Provider  Electronically Signed by           Trevon Logan DO  03/08/21 2052

## 2021-03-09 ENCOUNTER — APPOINTMENT (INPATIENT)
Dept: RADIOLOGY | Facility: HOSPITAL | Age: 41
DRG: 661 | End: 2021-03-09
Payer: COMMERCIAL

## 2021-03-09 ENCOUNTER — TELEPHONE (OUTPATIENT)
Dept: UROLOGY | Facility: MEDICAL CENTER | Age: 41
End: 2021-03-09

## 2021-03-09 ENCOUNTER — ANESTHESIA (INPATIENT)
Dept: PERIOP | Facility: HOSPITAL | Age: 41
DRG: 661 | End: 2021-03-09
Payer: COMMERCIAL

## 2021-03-09 ENCOUNTER — TELEPHONE (OUTPATIENT)
Dept: UROLOGY | Facility: CLINIC | Age: 41
End: 2021-03-09

## 2021-03-09 ENCOUNTER — ANESTHESIA EVENT (INPATIENT)
Dept: PERIOP | Facility: HOSPITAL | Age: 41
DRG: 661 | End: 2021-03-09
Payer: COMMERCIAL

## 2021-03-09 VITALS
HEIGHT: 60 IN | OXYGEN SATURATION: 98 % | HEART RATE: 66 BPM | DIASTOLIC BLOOD PRESSURE: 78 MMHG | WEIGHT: 131 LBS | TEMPERATURE: 98 F | RESPIRATION RATE: 18 BRPM | BODY MASS INDEX: 25.72 KG/M2 | SYSTOLIC BLOOD PRESSURE: 132 MMHG

## 2021-03-09 PROCEDURE — C2617 STENT, NON-COR, TEM W/O DEL: HCPCS | Performed by: UROLOGY

## 2021-03-09 PROCEDURE — 74420 UROGRAPHY RTRGR +-KUB: CPT

## 2021-03-09 PROCEDURE — 82360 CALCULUS ASSAY QUANT: CPT | Performed by: UROLOGY

## 2021-03-09 PROCEDURE — 99238 HOSP IP/OBS DSCHRG MGMT 30/<: CPT | Performed by: INTERNAL MEDICINE

## 2021-03-09 PROCEDURE — 0T778DZ DILATION OF LEFT URETER WITH INTRALUMINAL DEVICE, VIA NATURAL OR ARTIFICIAL OPENING ENDOSCOPIC: ICD-10-PCS | Performed by: UROLOGY

## 2021-03-09 PROCEDURE — C1769 GUIDE WIRE: HCPCS | Performed by: UROLOGY

## 2021-03-09 PROCEDURE — 99254 IP/OBS CNSLTJ NEW/EST MOD 60: CPT | Performed by: UROLOGY

## 2021-03-09 PROCEDURE — 0TC78ZZ EXTIRPATION OF MATTER FROM LEFT URETER, VIA NATURAL OR ARTIFICIAL OPENING ENDOSCOPIC: ICD-10-PCS | Performed by: UROLOGY

## 2021-03-09 PROCEDURE — 52356 CYSTO/URETERO W/LITHOTRIPSY: CPT | Performed by: UROLOGY

## 2021-03-09 DEVICE — STENT URETERAL 6FR 24CML INLAY OPTIMA: Type: IMPLANTABLE DEVICE | Status: FUNCTIONAL

## 2021-03-09 RX ORDER — NAPROXEN 500 MG/1
500 TABLET ORAL 2 TIMES DAILY WITH MEALS
Qty: 10 TABLET | Refills: 0 | Status: SHIPPED | OUTPATIENT
Start: 2021-03-09 | End: 2021-03-17 | Stop reason: ALTCHOICE

## 2021-03-09 RX ORDER — DOCUSATE SODIUM 100 MG/1
100 CAPSULE, LIQUID FILLED ORAL 2 TIMES DAILY
Qty: 30 CAPSULE | Refills: 0 | Status: SHIPPED | OUTPATIENT
Start: 2021-03-09 | End: 2021-03-17 | Stop reason: ALTCHOICE

## 2021-03-09 RX ORDER — SULFAMETHOXAZOLE AND TRIMETHOPRIM 800; 160 MG/1; MG/1
1 TABLET ORAL EVERY 12 HOURS SCHEDULED
Qty: 4 TABLET | Refills: 0 | Status: SHIPPED | OUTPATIENT
Start: 2021-03-09 | End: 2021-03-11

## 2021-03-09 RX ORDER — LIDOCAINE HYDROCHLORIDE 10 MG/ML
INJECTION, SOLUTION EPIDURAL; INFILTRATION; INTRACAUDAL; PERINEURAL AS NEEDED
Status: DISCONTINUED | OUTPATIENT
Start: 2021-03-09 | End: 2021-03-09

## 2021-03-09 RX ORDER — DEXAMETHASONE SODIUM PHOSPHATE 4 MG/ML
INJECTION, SOLUTION INTRA-ARTICULAR; INTRALESIONAL; INTRAMUSCULAR; INTRAVENOUS; SOFT TISSUE AS NEEDED
Status: DISCONTINUED | OUTPATIENT
Start: 2021-03-09 | End: 2021-03-09

## 2021-03-09 RX ORDER — PHENAZOPYRIDINE HYDROCHLORIDE 200 MG/1
200 TABLET, FILM COATED ORAL 3 TIMES DAILY PRN
Qty: 10 TABLET | Refills: 0 | Status: SHIPPED | OUTPATIENT
Start: 2021-03-09 | End: 2021-03-12

## 2021-03-09 RX ORDER — ONDANSETRON 2 MG/ML
INJECTION INTRAMUSCULAR; INTRAVENOUS AS NEEDED
Status: DISCONTINUED | OUTPATIENT
Start: 2021-03-09 | End: 2021-03-09

## 2021-03-09 RX ORDER — OXYBUTYNIN CHLORIDE 5 MG/1
5 TABLET ORAL EVERY 6 HOURS PRN
Qty: 30 TABLET | Refills: 0 | Status: SHIPPED | OUTPATIENT
Start: 2021-03-09 | End: 2021-05-17

## 2021-03-09 RX ORDER — SODIUM CHLORIDE 9 MG/ML
50 INJECTION, SOLUTION INTRAVENOUS CONTINUOUS
Status: DISPENSED | OUTPATIENT
Start: 2021-03-09 | End: 2021-03-09

## 2021-03-09 RX ORDER — OXYCODONE HYDROCHLORIDE 5 MG/1
5 TABLET ORAL EVERY 4 HOURS PRN
Qty: 5 TABLET | Refills: 0 | Status: SHIPPED | OUTPATIENT
Start: 2021-03-09 | End: 2021-03-14

## 2021-03-09 RX ORDER — ONDANSETRON 2 MG/ML
4 INJECTION INTRAMUSCULAR; INTRAVENOUS ONCE AS NEEDED
Status: DISCONTINUED | OUTPATIENT
Start: 2021-03-09 | End: 2021-03-09

## 2021-03-09 RX ORDER — GLYCOPYRROLATE 0.2 MG/ML
INJECTION INTRAMUSCULAR; INTRAVENOUS AS NEEDED
Status: DISCONTINUED | OUTPATIENT
Start: 2021-03-09 | End: 2021-03-09

## 2021-03-09 RX ORDER — ACETAMINOPHEN 325 MG/1
650 TABLET ORAL EVERY 4 HOURS PRN
Qty: 30 TABLET | Refills: 0
Start: 2021-03-09 | End: 2021-05-17

## 2021-03-09 RX ORDER — MIDAZOLAM HYDROCHLORIDE 2 MG/2ML
INJECTION, SOLUTION INTRAMUSCULAR; INTRAVENOUS AS NEEDED
Status: DISCONTINUED | OUTPATIENT
Start: 2021-03-09 | End: 2021-03-09

## 2021-03-09 RX ORDER — CIPROFLOXACIN 2 MG/ML
400 INJECTION, SOLUTION INTRAVENOUS ONCE
Status: COMPLETED | OUTPATIENT
Start: 2021-03-09 | End: 2021-03-09

## 2021-03-09 RX ORDER — MAGNESIUM HYDROXIDE 1200 MG/15ML
LIQUID ORAL AS NEEDED
Status: DISCONTINUED | OUTPATIENT
Start: 2021-03-09 | End: 2021-03-09 | Stop reason: HOSPADM

## 2021-03-09 RX ORDER — FENTANYL CITRATE 50 UG/ML
INJECTION, SOLUTION INTRAMUSCULAR; INTRAVENOUS AS NEEDED
Status: DISCONTINUED | OUTPATIENT
Start: 2021-03-09 | End: 2021-03-09

## 2021-03-09 RX ORDER — PROPOFOL 10 MG/ML
INJECTION, EMULSION INTRAVENOUS AS NEEDED
Status: DISCONTINUED | OUTPATIENT
Start: 2021-03-09 | End: 2021-03-09

## 2021-03-09 RX ORDER — TAMSULOSIN HYDROCHLORIDE 0.4 MG/1
0.4 CAPSULE ORAL
Qty: 15 CAPSULE | Refills: 0 | Status: SHIPPED | OUTPATIENT
Start: 2021-03-09 | End: 2021-03-17 | Stop reason: ALTCHOICE

## 2021-03-09 RX ORDER — FENTANYL CITRATE/PF 50 MCG/ML
25 SYRINGE (ML) INJECTION
Status: DISCONTINUED | OUTPATIENT
Start: 2021-03-09 | End: 2021-03-09

## 2021-03-09 RX ADMIN — ONDANSETRON 4 MG: 2 INJECTION INTRAMUSCULAR; INTRAVENOUS at 14:09

## 2021-03-09 RX ADMIN — CIPROFLOXACIN: 2 INJECTION, SOLUTION INTRAVENOUS at 13:50

## 2021-03-09 RX ADMIN — Medication 25 MCG: at 15:12

## 2021-03-09 RX ADMIN — FENTANYL CITRATE 50 MCG: 50 INJECTION INTRAMUSCULAR; INTRAVENOUS at 14:01

## 2021-03-09 RX ADMIN — LIDOCAINE HYDROCHLORIDE 50 MG: 10 INJECTION, SOLUTION EPIDURAL; INFILTRATION; INTRACAUDAL; PERINEURAL at 14:01

## 2021-03-09 RX ADMIN — PHENYLEPHRINE HYDROCHLORIDE 200 MCG: 10 INJECTION INTRAVENOUS at 14:19

## 2021-03-09 RX ADMIN — PROPOFOL 200 MG: 10 INJECTION, EMULSION INTRAVENOUS at 14:02

## 2021-03-09 RX ADMIN — DEXAMETHASONE SODIUM PHOSPHATE 10 MG: 4 INJECTION INTRA-ARTICULAR; INTRALESIONAL; INTRAMUSCULAR; INTRAVENOUS; SOFT TISSUE at 14:09

## 2021-03-09 RX ADMIN — HYDROMORPHONE HYDROCHLORIDE 0.5 MG: 1 INJECTION, SOLUTION INTRAMUSCULAR; INTRAVENOUS; SUBCUTANEOUS at 17:53

## 2021-03-09 RX ADMIN — FENTANYL CITRATE 50 MCG: 50 INJECTION INTRAMUSCULAR; INTRAVENOUS at 14:08

## 2021-03-09 RX ADMIN — PHENYLEPHRINE HYDROCHLORIDE 200 MCG: 10 INJECTION INTRAVENOUS at 14:12

## 2021-03-09 RX ADMIN — OXYCODONE HYDROCHLORIDE 5 MG: 5 TABLET ORAL at 08:27

## 2021-03-09 RX ADMIN — OXYCODONE HYDROCHLORIDE 10 MG: 10 TABLET ORAL at 15:47

## 2021-03-09 RX ADMIN — HYDROCHLOROTHIAZIDE 25 MG: 25 TABLET ORAL at 08:27

## 2021-03-09 RX ADMIN — TAMSULOSIN HYDROCHLORIDE 0.4 MG: 0.4 CAPSULE ORAL at 16:24

## 2021-03-09 RX ADMIN — GLYCOPYRROLATE 0.2 MG: 0.2 INJECTION, SOLUTION INTRAMUSCULAR; INTRAVENOUS at 14:06

## 2021-03-09 RX ADMIN — Medication 25 MCG: at 15:08

## 2021-03-09 RX ADMIN — LORATADINE 10 MG: 10 TABLET ORAL at 08:27

## 2021-03-09 RX ADMIN — MIDAZOLAM 2 MG: 1 INJECTION INTRAMUSCULAR; INTRAVENOUS at 13:54

## 2021-03-09 RX ADMIN — SODIUM CHLORIDE 50 ML/HR: 0.9 INJECTION, SOLUTION INTRAVENOUS at 10:22

## 2021-03-09 RX ADMIN — SODIUM CHLORIDE: 0.9 INJECTION, SOLUTION INTRAVENOUS at 14:20

## 2021-03-09 NOTE — ANESTHESIA PREPROCEDURE EVALUATION
Procedure:  CYSTOSCOPY URETEROSCOPY WITH LITHOTRIPSY HOLMIUM LASER, RETROGRADE PYELOGRAM AND INSERTION STENT URETERAL (Left Bladder)    Relevant Problems   CARDIO   (+) Essential hypertension        Physical Exam    Airway    Mallampati score: II         Dental   No notable dental hx     Cardiovascular      Pulmonary      Other Findings        Anesthesia Plan  ASA Score- 2     Anesthesia Type- general with ASA Monitors  Additional Monitors:   Airway Plan: LMA  Comment: I, Dr Cuco Donald, the attending physician, have personally seen and evaluated the patient prior to anesthetic care  I have reviewed the pre-anesthetic record, and other medical records if appropriate to the anesthetic care  If a CRNA is involved in the case, I have reviewed the CRNA assessment, if present, and agree  The patient is in a suitable condition to proceed with my formulated anesthetic plan          Plan Factors-    Chart reviewed  Induction- intravenous  Postoperative Plan-     Informed Consent- Anesthetic plan and risks discussed with patient  I personally reviewed this patient with the CRNA  Discussed and agreed on the Anesthesia Plan with the CRNA  Niurka Wade

## 2021-03-09 NOTE — DISCHARGE SUMMARY
Discharge- Soniya Purcell 1980, 47 y o  female MRN: [id number]    Unit/Bed#: [facility] 817-01 Encounter: [id number]    Primary Care Provider: Valerie Rose MD   Date and time admitted to hospital: 3/8/2021  9:52 PM        * Ureteral calculi  Assessment & Plan  - patient was seen in urology office on 3/01 for outpatient management of 5 mm right UVJ calculus with mild hydronephrosis  She was taking tramadol and norco with hydration with no relief of symptoms and presented to ER in [address] [facility]   - CT revealed 7 mm UVJ calculus on right with hydronephrosis, transferred to [facility] for urology consult with plan for OR tentatively today   - NPO for procedure, flomax, strain all urine, avoid nephrotoxins, pain management per orders     Essential hypertension  Assessment & Plan  - blood pressure acceptable, creatinine normal, continue HCTZ 25 mg                 Resolved Problems  Date Reviewed: 3/9/2021    None          Admission Date:   Admission Orders (From admission, onward)     Ordered        03/08/21 2203  INPATIENT ADMISSION  Once                     Admitting Diagnosis: Kidney stone [N20 0]        Procedures Performed: No orders of the defined types were placed in this encounter  Summary of Hospital Course: This is a very pleasant 62-year-old female who presents the hospital with flank pain from [address] [facility] ED  She was seen by Urology on March 1st, 2021 in the office, started on Norco and tramadol for pain  He was given hydration initially in addition to Flomax therapy  CT imaging on March 3rd had revealed a 7 mm UVJ calculus on the right side with associated hydronephrosis  Patient presents the hospital for further workup evaluation and subsequently was transferred to [facility] for urological consultation and tentative cystoscopy  Rom Derrell     He is status post cystoscopy with left retrograde pyelography with fluoroscopic interpretation; left ureteroscopy with laser lithotripsy and basket extraction of stone; left ureteral stent placement    Findings on cystoscopy methadone a left distal ureteral calculus markedly pack  Satira Learn was successfully completely extracted using combination of holmian laser lithotripsy and basket extraction  Stent for 2 to 3 weeks  Condition at Discharge: fair         Discharge instructions/Information to patient and family:   See after visit summary for information provided to patient and family  Provisions for Follow-Up Care:  See after visit summary for information related to follow-up care and any pertinent home health orders  PCP: Yordan Coppola MD    Disposition: Home    Planned Readmission: No    Discharge Statement   I spent 35 minutes discharging the patient  This time was spent on the day of discharge  I had direct contact with the patient on the day of discharge  Additional documentation is required if more than 30 minutes were spent on discharge  Discharge Medications:  See after visit summary for reconciled discharge medications provided to patient and family

## 2021-03-09 NOTE — TELEPHONE ENCOUNTER
Patient called from hospital regarding her pain medication that was prescribed after surgery by Dr Waqar Alberto  Medication needed prior auth but she will be paying out of pocket for this  Patient would like a call tomorrow regarding a return to work letter

## 2021-03-09 NOTE — LETTER
March 10, 2021     Patient: Manan Castañeda   YOB: 1980   Date of Visit: 3/9/2021       To Whom it May Concern:    Salinas Aguirre is under my professional care  She had a procedure on 3/9/2021  She may return to work on 3/11/21  If you have any questions or concerns, please don't hesitate to call           Sincerely,     Atif Machado MD

## 2021-03-09 NOTE — UTILIZATION REVIEW
Initial Clinical Review    Admission: Date/Time/Statement:   Admission Orders (From admission, onward)     Ordered        03/08/21 2203  INPATIENT ADMISSION  Once        TRANSFER FROM ED [facility] TO [place] [address]  LEVEL OF CARE            Orders Placed This Encounter   Procedures    INPATIENT ADMISSION     Standing Status:   Standing     Number of Occurrences:   1     Order Specific Question:   Level of Care     Answer:   Med Surg [16]     Order Specific Question:   Estimated length of stay     Answer:   More than 2 Midnights     Order Specific Question:   Certification     Answer:   I certify that inpatient services are medically necessary for this patient for a duration of greater than two midnights  See H&P and MD Progress Notes for additional information about the patient's course of treatment  Assessment/Plan:   Ms Saba [facility]  is a 46 yo female who presents as a transfer from the ED  [address] [facility] with c/o uncontrolled flank and back pain with nausea and known 7 mm right UVJ calculus with mild hydronephrosis   She was seen on 3/1 by Urology for same and was started on Norco, Tramadol with hydration and Flomax  She has no fever, chills, urinary retention or hematuria  She is admitted to INPATIENT status with Ureteral calculi - OR 3/9, NPO, IV fluids, pain mgmt, strain urine, avoid nephrotoxics        3/9 Urology Consult -  7 mm left UVJ stone causing mild left hydroureteronephrosis - will go to the OR today for planned cysto and ureteroscopy , NPO, PRN analgesia, IV fluids, antiemetics       ++++++++++++++++++++++++++++++  3/9 OPERATIVE NOTE   PRE-OP DIAGNOSIS:   1) Left ureteral calculus     POST-OP DIAGNOSIS:   1) Left ureteral calculus - Impacted     PROCEDURES PERFORMED:  1) Cystoscopy  2) Left retrograde pyelography with fluoroscopic interpretation  3) Left ureteroscopy with laser lithotripsy and basket extraction of stone  4) Left ureteral stent placement      SURGEON:  Timothy Dey MD     ANESTHESIA: General     FINDINGS:  Left distal ureteral calculus was markedly impacted  [place] Border was successfully completely using combination of holmium laser lithotripsy and basket extraction  Significant edema and denuding of the distal transmural ureter is noted  Retrograde pyelogram following removal of the stone showed no extravasation  Postoperative stent was left without a string for anticipated duration of 2-3 weeks time    ++++++++++++++++++++++++++++++  ,     Pain Score       03/08/21 2233       8          Wt Readings from Last 1 Encounters:   03/08/21 59 4 kg (131 lb)     Additional Vital Signs:   03/09/21 08:10:10  98 3 °F (36 8 °C)  68  16  114/67  83  98 %  --   03/08/21 21:58:20  97 9 °F (36 6 °C)  75  16  118/65  83  100 %  --     Pertinent Labs/Diagnostic Test Results:     3/3 CT renal stone study AP - 7 mm left ureterovesical junction obstructing calculus causing mild left hydroureteronephrosis  Scattered other subcentimeter left renal calculi      Results from last 7 days   Lab Units 03/08/21  1836 03/03/21  1936   WBC Thousand/uL 9 60 9 50   HEMOGLOBIN g/dL 14 7 14 4   HEMATOCRIT % 43 2 42 7   PLATELETS Thousands/uL 257 275   NEUTROS ABS Thousands/µL 6 00 5 60         Results from last 7 days   Lab Units 03/08/21  1836 03/03/21  1936   SODIUM mmol/L 139 140   POTASSIUM mmol/L 3 3* 3 4*   CHLORIDE mmol/L 99 100   CO2 mmol/L 31 29   ANION GAP mmol/L 9 11   BUN mg/dL 18 11   CREATININE mg/dL 1 10 0 74   EGFR ml/min/1 73sq m 64 103   CALCIUM mg/dL 10 3 10 2     Results from last 7 days   Lab Units 03/08/21  1836   AST U/L 15   ALT U/L 14   ALK PHOS U/L 49   TOTAL PROTEIN g/dL 7 6   ALBUMIN g/dL 4 8   TOTAL BILIRUBIN mg/dL 0 40         Results from last 7 days   Lab Units 03/08/21  1836 03/03/21  1936   GLUCOSE RANDOM mg/dL 92 111*     Results from last 7 days   Lab Units 03/08/21  1836   PROTIME seconds 13 0   INR  0 99   PTT seconds 29     Results from last 7 days   Lab Units 03/08/21  1845 03/03/21  1938 CLARITY UA  Clear Clear   COLOR UA  Straw Yellow   SPEC GRAV UA  <=1 005* <=1 005*   PH UA  6 0 7 0   GLUCOSE UA mg/dl Negative Negative   KETONES UA mg/dl Negative Negative   BLOOD UA  Negative 2+*   PROTEIN UA mg/dl Negative Negative   NITRITE UA  Negative Negative   BILIRUBIN UA  Negative Negative   UROBILINOGEN UA E U /dl 0 2 0 2   LEUKOCYTES UA  Negative Negative   WBC UA /hpf  --  0-1   RBC UA /hpf  --  1-2   BACTERIA UA /hpf  --  None Seen   EPITHELIAL CELLS WET PREP /hpf  --  Occasional       Past Medical History:   Diagnosis Date    Cataract     left eye    Hot flashes     Hypertension     Kidney stone     Migraine     menstural migraines    Restless leg syndrome      Present on Admission:   Essential hypertension      Admitting Diagnosis: Kidney stone [N20 0]     Age/Sex: 47 y o  female     Admission Orders:    Scheduled Medications:  hydrochlorothiazide, 25 mg, Oral, Daily  loratadine, 10 mg, Oral, Daily  rOPINIRole, 2 mg, Oral, HS  tamsulosin, 0 4 mg, Oral, Daily With Dinner    Continuous IV Infusions:  sodium chloride, 50 mL/hr, Intravenous, Continuous      PRN Meds:  acetaminophen, 650 mg, Oral, Q6H PRN  diphenhydrAMINE, 25 mg, Intravenous, Q6H PRN  HYDROmorphone, 0 5 mg, Intravenous, Q3H PRN  ondansetron, 4 mg, Intravenous, Q6H PRN  oxyCODONE, 10 mg, Oral, Q4H PRN - x 1 3/8  oxyCODONE, 5 mg, Oral, Q4H PRN - x 1 3/9    SCDs  NPO for OR  Strain urine  OOB as angi   IP CONSULT TO UROLOGY    Network Utilization Review Department  ATTENTION: Please call with any questions or concerns to [phone] and carefully listen to the prompts so that you are directed to the right person  All voicemails are confidential   Dion Chaidez all requests for admission clinical reviews, approved or denied determinations and any other requests to dedicated fax number below belonging to the campus where the patient is receiving treatment   List of dedicated fax numbers for the Facilities:  [facility] ADMISSION DENIALS (Administrative/Medical Necessity) [phone]   [address] (Maternity/NICU/Pediatrics) 261 [facility],7Th Floor [facility] 40 125 [facility]  [phone]   Nicholas Anderson 5087 (  Jesús Hancock "Rosalinda" 103) [address] [address] [place] [address] [phone]   [address] 951 [phone]

## 2021-03-09 NOTE — PROGRESS NOTES
Progress Note - Shelley Gates 1980, 47 y o  female MRN: [id number]    Unit/Bed#: [place] 817-01 Encounter: [id number]    Primary Care Provider: Dottie Erwin MD   Date and time admitted to hospital: 3/8/2021  9:52 PM        * Ureteral calculi  Assessment & Plan  - patient was seen in urology office on 3/01 for outpatient management of 5 mm right UVJ calculus with mild hydronephrosis  She was taking tramadol and norco with hydration with no relief of symptoms and presented to ER in [address]   - CT revealed 7 mm UVJ calculus on right with hydronephrosis, transferred to [facility] for urology consult with plan for OR tentatively today   - NPO for procedure, flomax, strain all urine, avoid nephrotoxins, pain management per orders     Essential hypertension  Assessment & Plan  - blood pressure acceptable, creatinine normal, continue HCTZ 25 mg     VTE Phar macologic Prophylaxis:   Pharmacologic: Ambulatory  Mechanical VTE Prophylaxis in Place: No    Patient Centered Rounds: I have performed bedside rounds with nursing staff today  Time Spent for Care: 15 minutes  More than 50% of total time spent on counseling and coordination of care as described above  Current Length of Stay: 1 day(s)    Current Patient Status: Inpatient   Certification Statement: The patient will continue to require additional inpatient hospital stay due to Need to monitor stone  Discharge Plan:  Possible discharge later today after urological intervention    Code Status: Level 1 - Full Code      Subjective:   No acute distress    Objective:     Vitals:   Temp (24hrs), Av 8 °F (36 6 °C), Min:97 3 °F (36 3 °C), Max:98 3 °F (36 8 °C)    Temp:  [97 3 °F (36 3 °C)-98 3 °F (36 8 °C)] 98 3 °F (36 8 °C)  HR:  [62-75] 68  Resp:  [16-18] 16  BP: (114-141)/(65-87) 114/67  SpO2:  [98 %-100 %] 98 %  Body mass index is 25 58 kg/m²       Input and Output Summary (last 24 hours):     No intake or output data in the 24 hours ending 21 9651    Physical Exam:     Physical Exam  HENT:      Head: Normocephalic and atraumatic  Cardiovascular:      Rate and Rhythm: Normal rate and regular rhythm  Pulses: Normal pulses  Heart sounds: Normal heart sounds  Pulmonary:      Effort: Pulmonary effort is normal       Breath sounds: Normal breath sounds  Abdominal:      General: Abdomen is flat  Palpations: Abdomen is soft  Musculoskeletal:         General: No swelling  Skin:     General: Skin is warm and dry  Neurological:      General: No focal deficit present  Mental Status: She is alert and oriented to person, place, and time  Additional Data:     Labs:    Results from last 7 days   Lab Units 03/08/21  1836   WBC Thousand/uL 9 60   HEMOGLOBIN g/dL 14 7   HEMATOCRIT % 43 2   PLATELETS Thousands/uL 257   NEUTROS PCT % 63   LYMPHS PCT % 29   MONOS PCT % 6   EOS PCT % 2     Results from last 7 days   Lab Units 03/08/21  1836   POTASSIUM mmol/L 3 3*   CHLORIDE mmol/L 99   CO2 mmol/L 31   BUN mg/dL 18   CREATININE mg/dL 1 10   CALCIUM mg/dL 10 3   ALK PHOS U/L 49   ALT U/L 14   AST U/L 15     Results from last 7 days   Lab Units 03/08/21  1836   INR  0 99       * I Have Reviewed All Lab Data Listed Above  * Additional Pertinent Lab Tests Reviewed:  All Labs Within Last 24 Hours Reviewed        Recent Cultures (last 7 days):           Last 24 Hours Medication List:   Current Facility-Administered Medications   Medication Dose Route Frequency Provider Last Rate    acetaminophen  650 mg Oral Q6H PRN Eusebia Carcamo, DO      diphenhydrAMINE  25 mg Intravenous Q6H PRN Eusebia Carcamo, DO      hydrochlorothiazide  25 mg Oral Daily Jolene Florian, DO      HYDROmorphone  0 5 mg Intravenous Q3H PRN Eusebia Carcamo, DO      loratadine  10 mg Oral Daily Jolene Florian, DO      ondansetron  4 mg Intravenous Q6H PRN Eusebia Carcamo, DO      oxyCODONE  10 mg Oral Q4H PRN Eusebia Carcamo, DO      oxyCODONE  5 mg Oral Q4H PRN Eusebia Carcamo, DO      rOPINIRole  2 mg Oral HS Isiah Carlson DO      tamsulosin  0 4 mg Oral Daily With 9 [facility],           Today, Patient Was Seen By: Lisa Murphy DO    ** Please Note: Dictation voice to text software may have been used in the creation of this document   **

## 2021-03-09 NOTE — DISCHARGE INSTRUCTIONS
Shelley:    Your surgery went very well  Your ureteral stone was highly impacted (stuck) within the wall of ureter  It was however completely removed using a scope and a laser  Please take your medications as prescribed with caution for comfort  Most importantly please drink 8 glasses of water per day for the next week  I will leave your stent in place for approximately 2 weeks to allow everything to heal properly  I will schedule you for a minor procedure and a cystoscopy to remove the stent in our office around that time  Please call with any questions or concerns in the meantime    Carina Melgar MD,PhD  [facility]  [phone]    WHAT IS A STENT? At the end of the procedure, your doctor may place a stent into your ureter  A stent is a thin, flexible piece of plastic that will hold open your ureter while the remaining small pieces of stone pass  This allows your kidney to drain easily and prevents you from having to pass these small stone pieces on your own, which could be painful  The stent is about 12 inches long and looks and feels like a thin piece of spaghetti  AFTER THE PROCEDURE  After the procedure you may experience the following symptoms  All of these are normal and should resolve within 1 or 2 days after your stent is removed  1) Urinary frequency (urinating more often than usual)  2) Urinary urgency (the sensation that you need to urinate right away)  3) Painful urination (this can be pain in your bladder or in your back when  you urinate)  4) Blood in your urine ( a stent can irritate the lining of your bladder causing it to bleed)  5) Back/Flank pain, especially with urination  You will receive a prescription for narcotic pain medication after the procedure  You will also receive a prescription for tamsulosin which you will take once a day for 2 weeks to help relax your ureter and decrease stent discomfort   You will also need to purchase a stool softener (i e  Colace) or mild laxative (i e  Miralax) as the narcotic pain medication can make you constipated  This is important as constipation can exacerbate stent related symptoms

## 2021-03-09 NOTE — OP NOTE
Operative Note     PATIENT:  Magen Molina (MRN [id number])    DATE OF PROCEDURE:   3/9/2021    PRE-OP DIAGNOSIS:   1) Left ureteral calculus    POST-OP DIAGNOSIS:   1) Left ureteral calculus - Impacted    PROCEDURES PERFORMED:  1) Cystoscopy  2) Left retrograde pyelography with fluoroscopic interpretation  3) Left ureteroscopy with laser lithotripsy and basket extraction of stone  4) Left ureteral stent placement     SURGEON:  Imelda Baig MD    NOTE:  There were no qualified teaching residents to assist with this case    ANESTHESIA: General     COMPLICATIONS:   None    ANTIBIOTICS:  Cipro    INTRAOPERATIVE THROMBOEMBOLISM PROPHYLAXIS:  Pneumatic compression stockings     FINDINGS:  Left distal ureteral calculus was markedly impacted  [place] Border was successfully completely using combination of holmium laser lithotripsy and basket extraction  Significant edema and denuding of the distal transmural ureter is noted  Retrograde pyelogram following removal of the stone showed no extravasation  Postoperative stent was left without a string for anticipated duration of 2-3 weeks time    INDICATIONS FOR PROCEDURE:  Magen Molina is an 47 y o  old female with Left ureteral calculus  Patient has been hospitalized for worsening pain  After discussing the options, the patient elected to undergo ureteroscopy and ureteral stent placement  We discussed the procedure in detail, the alternatives, and the risks, and they signed informed consent to proceed  PROCEDURE IN DETAIL:   The patient was identified and brought to the OR  Antibiotic prophylaxis and DVT prophylaxis were administered  They were placed in the comfortable dorsal lithotomy position with care to pad all pressure points  They were prepped and draped in the usual sterile fashion using hibiclens  A surgical time out was performed with all in the room in agreement with the correct patient, procedure, indications, and laterality    A 21-[place] rigid cystoscope was used to enter the bladder  The bladder was inspected in its entirety and there were no lesions noted  The ureteral orifices were identified in their normal orthotopic positions  The Left ureteral orifice was identified and a 5 Fr open ended catheter was placed into the ureteral orifice    A retrograde pyelogram was performed with the findings as described above  A Sensor wire was advanced up to the kidney under fluoroscopic guidance  Leaving this safety wire in place, the bladder was drained  A   7 5 [place] semi-rigid ureteroscope was advanced up the ureter under vision   The stone was encountered in the distal ureter  The stone was noted to be impacted  A holmium laser fiber was passed through the ureteroscope and laser lithotripsy was commenced at settings of 0 7 J and 7 Hz  The stones were fragmented to very small pieces  Once we were satisfied that the stone was fragmented to dust, a 1 9 [place] zero tip nitinol basket was passed through the ureteroscope  The residual fragments were basketed out and removed  Following verification of complete stone removal, I was able to negotiate the ureteral scope proximal to the stone and evaluate the distal and mid ureter  I confirmed that all stone fragments were completely removed  The ureter was noted to be markedly denuded and edematous secondary to the highly impacted nature of the stone  I did perform a targeted retrograde pyelogram through the ureteral scope in the region of concern  This was negative for any extravasation  A JJ stent was then passed up the wire  under fluoroscopic guidance into the kidney with a good curl noted in the kidney and in the bladder    The bladder was drained  The patient was placed back supine, awakened from general anesthesia and brought to recovery room in stable condition        ESTIMATED BLOOD LOSS:  Minimal      SPECIMENS:   Order Name Source Comment Collection Info Order Time STONE ANALYSIS Kidney, Left  Collected By: Poncho Mike MD 3/9/2021  2:38 PM        IMPLANTS:   Implant Name Type Inv  Item Serial No   Lot No  LRB No  Used Action   STENT URETERAL 6FR 24CML SOFT PU - [id number]  STENT URETERAL 6FR 24CML SOFT PU  [employer]  Left 1 Implanted        COMPLICATIONS: None    DISPOSITION: PACU    PLAN:  Patient is stable for discharge home  I did provide discharge prescriptions including analgesics and discharge instructions  I requested follow-up in our office in 2-3 weeks for cystoscopy and ureteral stent removal   Patient will need follow-up imaging thereafter    She is certainly at risk for ureteral stricture formation and I will recommend q 6 months ultrasounds for another 1-2 years after her postoperative imaging is complete

## 2021-03-09 NOTE — PLAN OF CARE
Problem: PAIN - ADULT  Goal: Verbalizes/displays adequate comfort level or baseline comfort level  Description: Interventions:  - Encourage patient to monitor pain and request assistance  - Assess pain using appropriate pain scale  - Administer analgesics based on type and severity of pain and evaluate response  - Implement non-pharmacological measures as appropriate and evaluate response  - Consider cultural and social influences on pain and pain management  - Notify physician/advanced practitioner if interventions unsuccessful or patient reports new pain  Outcome: Progressing     Problem: INFECTION - ADULT  Goal: Absence or prevention of progression during hospitalization  Description: INTERVENTIONS:  - Assess and monitor for signs and symptoms of infection  - Monitor lab/diagnostic results  - Monitor all insertion sites, i e  indwelling lines, tubes, and drains  - Monitor endotracheal if appropriate and nasal secretions for changes in amount and color  - [place] appropriate cooling/warming therapies per order  - Administer medications as ordered  - Instruct and encourage patient and family to use good hand hygiene technique  - Identify and instruct in appropriate isolation precautions for identified infection/condition  Outcome: Progressing  Goal: Absence of fever/infection during neutropenic period  Description: INTERVENTIONS:  - Monitor WBC    Outcome: Progressing     Problem: SAFETY ADULT  Goal: Patient will remain free of falls  Description: INTERVENTIONS:  - Assess patient frequently for physical needs  -  Identify cognitive and physical deficits and behaviors that affect risk of falls    -  [place] fall precautions as indicated by assessment   - Educate patient/family on patient safety including physical limitations  - Instruct patient to call for assistance with activity based on assessment  - Modify environment to reduce risk of injury  - Consider OT/PT consult to assist with strengthening/mobility  Outcome: Progressing  Goal: Maintain or return to baseline ADL function  Description: INTERVENTIONS:  -  Assess patient's ability to carry out ADLs; assess patient's baseline for ADL function and identify physical deficits which impact ability to perform ADLs (bathing, care of mouth/teeth, toileting, grooming, dressing, etc )  - Assess/evaluate cause of self-care deficits   - Assess range of motion  - Assess patient's mobility; develop plan if impaired  - Assess patient's need for assistive devices and provide as appropriate  - Encourage maximum independence but intervene and supervise when necessary  - Involve family in performance of ADLs  - Assess for home care needs following discharge   - Consider OT consult to assist with ADL evaluation and planning for discharge  - Provide patient education as appropriate  Outcome: Progressing  Goal: Maintain or return mobility status to optimal level  Description: INTERVENTIONS:  - Assess patient's baseline mobility status (ambulation, transfers, stairs, etc )    - Identify cognitive and physical deficits and behaviors that affect mobility  - Identify mobility aids required to assist with transfers and/or ambulation (gait belt, sit-to-stand, lift, walker, cane, etc )  - [place] fall precautions as indicated by assessment  - Record patient progress and toleration of activity level on Mobility SBAR; progress patient to next Phase/Stage  - Instruct patient to call for assistance with activity based on assessment  - Consider rehabilitation consult to assist with strengthening/weightbearing, etc   Outcome: Progressing     Problem: DISCHARGE PLANNING  Goal: Discharge to home or other facility with appropriate resources  Description: INTERVENTIONS:  - Identify barriers to discharge w/patient and caregiver  - Arrange for needed discharge resources and transportation as appropriate  - Identify discharge learning needs (meds, wound care, etc )  - Arrange for interpretive services to assist at discharge as needed  - Refer to Case Management Department for coordinating discharge planning if the patient needs post-hospital services based on physician/advanced practitioner order or complex needs related to functional status, cognitive ability, or social support system  Outcome: Progressing     Problem: Knowledge Deficit  Goal: Patient/family/caregiver demonstrates understanding of disease process, treatment plan, medications, and discharge instructions  Description: Complete learning assessment and assess knowledge base    Interventions:  - Provide teaching at level of understanding  - Provide teaching via preferred learning methods  Outcome: Progressing

## 2021-03-09 NOTE — ASSESSMENT & PLAN NOTE
- patient was seen in urology office on 3/01 for outpatient management of 5 mm right UVJ calculus with mild hydronephrosis   She was taking tramadol and norco with hydration with no relief of symptoms and presented to ER in [facility]   - CT revealed 7 mm UVJ calculus on right with hydronephrosis, transferred to [facility] for urology consult with plan for OR tomorrow  - NPO, flomax, strain all urine, avoid nephrotoxins, pain management per orders

## 2021-03-09 NOTE — ASSESSMENT & PLAN NOTE
- patient was seen in urology office on 3/01 for outpatient management of 5 mm right UVJ calculus with mild hydronephrosis   She was taking tramadol and norco with hydration with no relief of symptoms and presented to ER in [facility]   - CT revealed 7 mm UVJ calculus on right with hydronephrosis, transferred to [facility] for urology consult with plan for OR tentatively today   - NPO for procedure, flomax, strain all urine, avoid nephrotoxins, pain management per orders

## 2021-03-09 NOTE — TELEPHONE ENCOUNTER
Patient scheduled for cysto/stent removal 3/29 at [facility] located within [facility] office  Will confirm with patient upon discharge

## 2021-03-09 NOTE — TELEPHONE ENCOUNTER
Status post ureteroscopy  Complete removal of a highly impacted left distal ureteral calculus  She has a stent without a string  Please schedule cystoscopy with stent removal with advanced practitioner at [place] in approximately 2-3 weeks  Following this she will need a KUB and ultrasound 6 weeks after      Due to the highly impacted nature of her stone, I would like her to be seen q 6 months after that with an ultrasound for the next year to ensure no development of ureteral stricture disease

## 2021-03-09 NOTE — H&P
H&P- Shelley Gates 1980, 47 y o  female MRN: [id number]    Unit/Bed#: [facility] 817-01 Encounter: [id number]    Primary Care Provider: Dottie Erwin MD   Date and time admitted to hospital: 3/8/2021  9:52 PM        * Ureteral calculi  Assessment & Plan  - patient was seen in urology office on 3/01 for outpatient management of 5 mm right UVJ calculus with mild hydronephrosis  She was taking tramadol and norco with hydration with no relief of symptoms and presented to ER in [address] [place] [address]   - CT revealed 7 mm UVJ calculus on right with hydronephrosis, transferred to [facility] AND [facility] for urology consult with plan for OR tomorrow  - NPO, flomax, strain all urine, avoid nephrotoxins, pain management per orders     Essential hypertension  Assessment & Plan  - blood pressure acceptable, creatinine normal, continue HCTZ 25 mg           VTE Prophylaxis:Low risk- ambulate patient   Code Status: Level 1 Full code   iscussion with family: patient declined     Anticipated Length of Stay:  Patient will be admitted on an Inpatient basis with an anticipated length of stay of  > 2 midnights  Justification for Hospital Stay: UVJ calculus with hydronephrosis with urology consult and plan for OR tomorrow     Total Time for Visit, including Counseling / Coordination of Care: 45 minutes  Greater than 50% of this total time spent on direct patient counseling and coordination of care  Chief Complaint:   Flank pain     History of Present Illness:    Shelley Gates is a 47 y o  female who presents with flank pain to [address] [place] [address] ED  She was seen by urology on 3/1, started on norco and tramadol with hydration and flomax, with uncontrolled pain on this regimen  She has associated nausea when pain is severe  Pain is in low back and lower abdomen/pelvic region  She denies hematuria, urinary retention, dysuria, fever and chills  Review of Systems:    Review of Systems   Constitutional: Negative for fatigue and fever  HENT: Negative for congestion  Respiratory: Negative for cough and shortness of breath  Cardiovascular: Negative for chest pain and palpitations  Gastrointestinal: Negative for constipation, diarrhea, nausea and vomiting         + flank pain    Genitourinary: Negative for difficulty urinating, dysuria, flank pain and frequency  Musculoskeletal: Positive for back pain  Neurological: Negative for dizziness and headaches  Past Medical and Surgical History:     Past Medical History:   Diagnosis Date    Cataract     left eye    Hot flashes     Hypertension     Kidney stone     Migraine     menstural migraines    Restless leg syndrome        Past Surgical History:   Procedure Laterality Date    EYE SURGERY      WISDOM TOOTH EXTRACTION Bilateral        Meds/Allergies:    Prior to Admission medications    Medication Sig Start Date End Date Taking?  Authorizing Provider   Cholecalciferol (VITAMIN D3) 1000 units CAPS Take 1 capsule by mouth daily     Historical Provider, MD   EPINEPHrine (EPIPEN) 0 3 mg/0 3 mL SOAJ Inject 0 3 mL (0 3 mg total) into a muscle once for 1 dose 4/15/20 2/22/21  RUMA Lazaro   hydrochlorothiazide (HYDRODIURIL) 25 mg tablet Take 1 tablet (25 mg total) by mouth daily 2/22/21   RUMA Craig   HYDROcodone-acetaminophen (NORCO) 5-325 mg per tablet Take 1 tablet by mouth every 6 (six) hours as needed for pain for up to 10 dosesMax Daily Amount: 4 tablets 3/3/21   Shae Cortez MD   ketorolac (ACULAR) 0 5 % ophthalmic solution  9/21/20   Historical Provider, MD   loratadine (CLARITIN) 10 mg tablet Take 1 tablet by mouth daily    Historical Provider, MD   naproxen (EC NAPROSYN) 500 MG EC tablet Take 1 tablet (500 mg total) by mouth 2 (two) times a day with meals for 14 days  Patient not taking: Reported on 3/1/2021 2/22/21 3/8/21  Didi Lugo PA-C   prednisoLONE acetate (PRED FORTE) 1 % ophthalmic suspension  9/21/20   Historical Provider, MD   rizatriptan (MAXALT) 10 MG tablet Take 1 tablet (10 mg total) by mouth once as needed for migraine for up to 1 dose May repeat in 2 hours if needed 11/6/20   Venessa Burkitt, CRNP   rOPINIRole (REQUIP) 1 mg tablet Take 2 tabs at 7pm 12/14/20   Venessa Burkitt, CRNP   tamsulosin [facility]) 0 4 mg Take 1 capsule (0 4 mg total) by mouth daily with dinner 3/1/21   RUMA Johnson   traMADol (ULTRAM) 50 mg tablet Take 1 tablet (50 mg total) by mouth every 6 (six) hours as needed for moderate pain 3/1/21   RUMA Johnson   vitamin E, tocopherol, 400 units capsule Take 400 Units by mouth daily    Historical Provider, MD   baclofen 10 mg tablet Take 1 tablet (10 mg total) by mouth 3 (three) times a day for 10 days 10/18/19 2/24/20  RUMA Marshall   ibuprofen (MOTRIN) 800 mg tablet Take 1 tablet (800 mg total) by mouth 3 (three) times a day for 15 days 10/18/19 2/24/20  RUMA Marshall     I have reviewed home medications with patient personally  Allergies: Allergies   Allergen Reactions    Amoxicillin Edema     Category: Allergy;        Social History:     Marital Status: Single   Patient Pre-hospital Living Situation: lives at home  Patient Pre-hospital Level of Mobility: ambulates without assistance   Patient Pre-hospital Diet Restrictions: none   Substance Use History:   Social History     Substance and Sexual Activity   Alcohol Use Yes    Frequency: 2-4 times a month    Comment: social     Social History     Tobacco Use   Smoking Status Never Smoker   Smokeless Tobacco Never Used     Social History     Substance and Sexual Activity   Drug Use No       Physical Exam  Constitutional:       Appearance: Normal appearance  She is not ill-appearing  HENT:      Mouth/Throat:      Mouth: Mucous membranes are moist    Eyes:      General:         Right eye: No discharge  Left eye: No discharge  Conjunctiva/sclera: Conjunctivae normal    Neck:      Musculoskeletal: Neck supple     Cardiovascular:      Rate and Rhythm: Normal rate and regular rhythm  Pulses: Normal pulses  Heart sounds: No murmur  Abdominal:      General: Bowel sounds are normal  There is no distension  Palpations: Abdomen is soft  Tenderness: There is no abdominal tenderness  There is no right CVA tenderness or left CVA tenderness  Musculoskeletal:      Right lower leg: No edema  Left lower leg: No edema  Skin:     General: Skin is warm  Findings: No rash  Neurological:      General: No focal deficit present  Mental Status: She is alert and oriented to person, place, and time     Psychiatric:         Mood and Affect: Mood normal          Behavior: Behavior normal

## 2021-03-09 NOTE — TELEPHONE ENCOUNTER
Just FYI In ER at [place] [address] now  Being transferred to [facility] for add-on while inpatient tomorrow   will followup plan postop

## 2021-03-09 NOTE — EMTALA/ACUTE CARE TRANSFER
[facility]  EMERGENCY DEPARTMENT  [address] [facility] [address] [phone]  Dept: [phone]      EMTALA TRANSFER CONSENT    NAME Soniya Purcell                                         1980                              MRN [id number]    I have been informed of my rights regarding examination, treatment, and transfer   by Dr Ashlee Douglass , *D O  Benefits: Continuity of care    Risks: Potential for delay in receiving treatment, Potential deterioration of medical condition, Loss of IV, Increased discomfort during transfer, Possible worsening of condition or death during transfer      Transfer Request   I acknowledge that my medical condition has been evaluated and explained to me by the emergency department physician or other qualified medical person and/or my attending physician who has recommended and offered to me further medical examination and treatment  I understand the Hospital's obligation with respect to the treatment and stabilization of my emergency medical condition  I nevertheless request to be transferred  I release the Hospital, the doctor, and any other persons caring for me from all responsibility or liability for any injury or ill effects that may result from my transfer and agree to accept all responsibility for the consequences of my choice to transfer, rather than receive stabilizing treatment at the Hospital  I understand that because the transfer is my request, my insurance may not provide reimbursement for the services  The Hospital will assist and direct me and my family in how to make arrangements for transfer, but the hospital is not liable for any fees charged by the transport service  In spite of this understanding, I refuse to consent to further medical examination and treatment which has been offered to me, and request transfer to  Shelby Carr Name, [address] : [facility]   I authorize the performance of emergency medical procedures and treatments upon me in both transit and upon arrival at the receiving facility  Additionally, I authorize the release of any and all medical records to the receiving facility and request they be transported with me, if possible  I authorize the performance of emergency medical procedures and treatments upon me in both transit and upon arrival at the receiving facility  Additionally, I authorize the release of any and all medical records to the receiving facility and request they be transported with me, if possible  I understand that the safest mode of transportation during a medical emergency is an ambulance and that the Hospital advocates the use of this mode of transport  Risks of traveling to the receiving facility by car, including absence of medical control, life sustaining equipment, such as oxygen, and medical personnel has been explained to me and I fully understand them  (STEVO CORRECT BOX BELOW)  [  ]  I consent to the stated transfer and to be transported by ambulance/helicopter  [ Erika Calvin consent to the stated transfer, but refuse transportation by ambulance and accept full responsibility for my transportation by car  I understand the risks of non-ambulance transfers and I exonerate the Hospital and its staff from any deterioration in my condition that results from this refusal     X___________________________________________    DATE  21  TIME________  Signature of patient or legally responsible individual signing on patient behalf           RELATIONSHIP TO PATIENT_________________________          Provider Certification    NAME Harry Lagos                                        SHARONA 1980                              MRN [id number]    A medical screening exam was performed on the above named patient  Based on the examination:    Condition Necessitating Transfer There were no encounter diagnoses      Patient Condition: The patient has been stabilized such that within reasonable medical probability, no material deterioration of the patient condition or the condition of the unborn child(karmen) is likely to result from the transfer, No noted underlying medical condition requiring transfer to another facility  Transfer is per preference and request of patient and/or family    Reason for Transfer: No bed available at level of patient's needs, Level of Care needed not available at this facility    Transfer Requirements: [address]   · Space available and qualified personnel available for treatment as acknowledged by [facility]  · Agreed to accept transfer and to provide appropriate medical treatment as acknowledged by       Mandi Mackenzie  · Appropriate medical records of the examination and treatment of the patient are provided at the time of transfer   [address], [address] _______  · Transfer will be performed by qualified personnel from Private vehicle  and appropriate transfer equipment as required, including the use of necessary and appropriate life support measures      Provider Certification: I have examined the patient and explained the following risks and benefits of being transferred/refusing transfer to the patient/family:  General risk, such as traffic hazards, adverse weather conditions, rough terrain or turbulence, possible failure of equipment (including vehicle or aircraft), or consequences of actions of persons outside the control of the transport personnel, Risk of worsening condition      Based on these reasonable risks and benefits to the patient and/or the unborn child(karmen), and based upon the information available at the time of the patients examination, I certify that the medical benefits reasonably to be expected from the provision of appropriate medical treatments at another medical facility outweigh the increasing risks, if any, to the individuals medical condition, and in the case of labor to the unborn child, from effecting the transfer      X____________________________________________ DATE 03/08/21        TIME_______      ORIGINAL - SEND TO MEDICAL RECORDS   COPY - SEND WITH PATIENT DURING TRANSFER

## 2021-03-09 NOTE — PLAN OF CARE
Problem: PAIN - ADULT  Goal: Verbalizes/displays adequate comfort level or baseline comfort level  Description: Interventions:  - Encourage patient to monitor pain and request assistance  - Assess pain using appropriate pain scale  - Administer analgesics based on type and severity of pain and evaluate response  - Implement non-pharmacological measures as appropriate and evaluate response  - Consider cultural and social influences on pain and pain management  - Notify physician/advanced practitioner if interventions unsuccessful or patient reports new pain  Outcome: Progressing     Problem: INFECTION - ADULT  Goal: Absence or prevention of progression during hospitalization  Description: INTERVENTIONS:  - Assess and monitor for signs and symptoms of infection  - Monitor lab/diagnostic results  - Monitor all insertion sites, i e  indwelling lines, tubes, and drains  - Monitor endotracheal if appropriate and nasal secretions for changes in amount and color  - [place] appropriate cooling/warming therapies per order  - Administer medications as ordered  - Instruct and encourage patient and family to use good hand hygiene technique  - Identify and instruct in appropriate isolation precautions for identified infection/condition  Outcome: Progressing  Goal: Absence of fever/infection during neutropenic period  Description: INTERVENTIONS:  - Monitor WBC    Outcome: Progressing     Problem: SAFETY ADULT  Goal: Patient will remain free of falls  Description: INTERVENTIONS:  - Assess patient frequently for physical needs  -  Identify cognitive and physical deficits and behaviors that affect risk of falls    -  [place] fall precautions as indicated by assessment   - Educate patient/family on patient safety including physical limitations  - Instruct patient to call for assistance with activity based on assessment  - Modify environment to reduce risk of injury  - Consider OT/PT consult to assist with strengthening/mobility  Outcome: Progressing  Goal: Maintain or return to baseline ADL function  Description: INTERVENTIONS:  -  Assess patient's ability to carry out ADLs; assess patient's baseline for ADL function and identify physical deficits which impact ability to perform ADLs (bathing, care of mouth/teeth, toileting, grooming, dressing, etc )  - Assess/evaluate cause of self-care deficits   - Assess range of motion  - Assess patient's mobility; develop plan if impaired  - Assess patient's need for assistive devices and provide as appropriate  - Encourage maximum independence but intervene and supervise when necessary  - Involve family in performance of ADLs  - Assess for home care needs following discharge   - Consider OT consult to assist with ADL evaluation and planning for discharge  - Provide patient education as appropriate  Outcome: Progressing  Goal: Maintain or return mobility status to optimal level  Description: INTERVENTIONS:  - Assess patient's baseline mobility status (ambulation, transfers, stairs, etc )    - Identify cognitive and physical deficits and behaviors that affect mobility  - Identify mobility aids required to assist with transfers and/or ambulation (gait belt, sit-to-stand, lift, walker, cane, etc )  - [place] fall precautions as indicated by assessment  - Record patient progress and toleration of activity level on Mobility SBAR; progress patient to next Phase/Stage  - Instruct patient to call for assistance with activity based on assessment  - Consider rehabilitation consult to assist with strengthening/weightbearing, etc   Outcome: Progressing     Problem: DISCHARGE PLANNING  Goal: Discharge to home or other facility with appropriate resources  Description: INTERVENTIONS:  - Identify barriers to discharge w/patient and caregiver  - Arrange for needed discharge resources and transportation as appropriate  - Identify discharge learning needs (meds, wound care, etc )  - Arrange for interpretive services to assist at discharge as needed  - Refer to Case Management Department for coordinating discharge planning if the patient needs post-hospital services based on physician/advanced practitioner order or complex needs related to functional status, cognitive ability, or social support system  Outcome: Progressing     Problem: Knowledge Deficit  Goal: Patient/family/caregiver demonstrates understanding of disease process, treatment plan, medications, and discharge instructions  Description: Complete learning assessment and assess knowledge base    Interventions:  - Provide teaching at level of understanding  - Provide teaching via preferred learning methods  Outcome: Progressing

## 2021-03-09 NOTE — CONSULTS
Consults: UROLOGY  Saray Feliciano 47 y o  female [id number]   Unit/Bed #: [facility] 817-01  Encounter: [id number]        Assessment  & Plan  :  Nephrolithiasis:  -CT scan showed the 7 mm left UVJ stone causing mild left hydroureteronephrosis  -NPO  -cystoscopy and ureteroscopy to be performed today  -IV fluids, analgesics, antiemetics  -UA negative for nitrates, leukocytes, blood  -ANDRE creatinine 1 10, baseline creatinine 0 74, most likely due to obstructive uropathy  Subjective :    Saray Feliciano  is a 47 y o  female who presented to the emergency room after left lower quadrant pain  She was diagnosed with a large left-sided stone with hydronephrosis discharge and set up with outpatient follow-up  Patient has had a cancellation at the office  Patient's pain progressed and began to become worse along with nausea and vomiting  Patient once and is requesting that she have her stone removed  She was prescribed pain medication outpatient and this seems to be no longer for managing her pain  Allergies   Allergen Reactions    Amoxicillin Edema     Category: Allergy;       Current Outpatient Medications   Medication Instructions    Cholecalciferol (VITAMIN D3) 1000 units CAPS 1 capsule, Oral, Daily    EPINEPHrine (EPIPEN) 0 3 mg, Intramuscular, Once    hydrochlorothiazide (HYDRODIURIL) 25 mg, Oral, Daily    HYDROcodone-acetaminophen (NORCO) 5-325 mg per tablet 1 tablet, Oral, Every 6 hours PRN    ketorolac (ACULAR) 0 5 % ophthalmic solution No dose, route, or frequency recorded   loratadine (CLARITIN) 10 mg tablet 1 tablet, Oral, Daily    naproxen (EC NAPROSYN) 500 mg, Oral, 2 times daily with meals    prednisoLONE acetate (PRED FORTE) 1 % ophthalmic suspension No dose, route, or frequency recorded      rizatriptan (MAXALT) 10 mg, Oral, Once as needed, May repeat in 2 hours if needed    rOPINIRole (REQUIP) 1 mg tablet Take 2 tabs at 7pm    tamsulosin (FLOMAX) 0 4 mg, Oral, Daily with dinner  traMADol (ULTRAM) 50 mg, Oral, Every 6 hours PRN    vitamin E (tocopherol) 400 Units, Oral, Daily      Past Medical History:   Diagnosis Date    Cataract     left eye    Hot flashes     Hypertension     Kidney stone     Migraine     menstural migraines    Restless leg syndrome      Past Surgical History:   Procedure Laterality Date    EYE SURGERY      WISDOM TOOTH EXTRACTION Bilateral      Family History   Problem Relation Age of Onset    Hypertension Mother     Thyroid disease Mother     Uterine cancer Mother     Hypertension Father     Diabetes Maternal Grandmother     Alzheimer's disease Maternal Grandmother     Alzheimer's disease Maternal Grandfather     No Known Problems Daughter      Social History     Socioeconomic History    Marital status: Single     Spouse name: Not on file    Number of children: Not on file    Years of education: Not on file    Highest education level: Not on file   Occupational History    Not on file   Social Needs    Financial resource strain: Not on file    Food insecurity     Worry: Not on file     Inability: Not on file    Transportation needs     Medical: Not on file     Non-medical: Not on file   Tobacco Use    Smoking status: Never Smoker    Smokeless tobacco: Never Used   Substance and Sexual Activity    Alcohol use: Yes     Frequency: 2-4 times a month     Comment: social    Drug use: No    Sexual activity: Yes     Partners: Female   Lifestyle    Physical activity     Days per week: Not on file     Minutes per session: Not on file    Stress: Not on file   Relationships    Social connections     Talks on phone: Not on file     Gets together: Not on file     Attends Jew service: Not on file     Active member of club or organization: Not on file     Attends meetings of clubs or organizations: Not on file     Relationship status: Not on file    Intimate partner violence     Fear of current or ex partner: Not on file     Emotionally abused: Not on file     Physically abused: Not on file     Forced sexual activity: Not on file   Other Topics Concern    Not on file   Social History Narrative    Not on file        Review of Systems   Constitutional: Negative  Negative for chills and fever  HENT: Negative  Eyes: Negative  Respiratory: Negative  Cardiovascular: Negative  Gastrointestinal: Positive for abdominal pain, nausea and vomiting  Endocrine: Negative  Genitourinary: Positive for flank pain  Negative for difficulty urinating, dysuria, frequency and urgency  Skin: Negative  Allergic/Immunologic: Negative  Neurological: Negative  Hematological: Negative  Psychiatric/Behavioral: Negative  Objective     Physical Exam  Constitutional:       General: She is not in acute distress  Appearance: Normal appearance  She is normal weight  She is not ill-appearing, toxic-appearing or diaphoretic  HENT:      Head: Normocephalic and atraumatic  Right Ear: External ear normal       Left Ear: External ear normal       Nose: Nose normal    Eyes:      General: No scleral icterus  Conjunctiva/sclera: Conjunctivae normal    Neck:      Musculoskeletal: Normal range of motion  Cardiovascular:      Rate and Rhythm: Normal rate and regular rhythm  Pulses: Normal pulses  Heart sounds: No murmur  No friction rub  No gallop  Pulmonary:      Effort: Pulmonary effort is normal  No respiratory distress  Breath sounds: Normal breath sounds  No wheezing, rhonchi or rales  Abdominal:      General: Abdomen is flat  Bowel sounds are normal  There is no distension  Palpations: Abdomen is soft  Tenderness: There is abdominal tenderness  There is no right CVA tenderness, left CVA tenderness or guarding  Comments: Left lower quadrant tenderness   Neurological:      General: No focal deficit present  Mental Status: She is alert and oriented to person, place, and time     Psychiatric: Mood and Affect: Mood normal          Behavior: Behavior normal          Thought Content: Thought content normal          Judgment: Judgment normal           Imaging:  CT scan renal stone study on 03/03/2021:     IMPRESSION:     7 mm left ureterovesical junction obstructing calculus causing mild left hydroureteronephrosis      Labs:  Lab Results   Component Value Date    SODIUM 139 03/08/2021    K 3 3 (L) 03/08/2021    CL 99 03/08/2021    CO2 31 03/08/2021    BUN 18 03/08/2021    CREATININE 1 10 03/08/2021    GLUC 92 03/08/2021    CALCIUM 10 3 03/08/2021         Lab Results   Component Value Date    WBC 9 60 03/08/2021    HGB 14 7 03/08/2021    HCT 43 2 03/08/2021    MCV 86 03/08/2021     03/08/2021         VTE Pharmacologic Prophylaxis:  None  VTE Mechanical Prophylaxis: sequential compression device     Nay Jaffe PA-C

## 2021-03-09 NOTE — ANESTHESIA POSTPROCEDURE EVALUATION
Post-Op Assessment Note    CV Status:  Stable  Pain Score: 0    Pain management: adequate     Mental Status:  Alert and awake   Hydration Status:  Euvolemic   PONV Controlled:  Controlled   Airway Patency:  Patent      Post Op Vitals Reviewed: Yes      Staff: CRNA         No complications documented      BP   130/70   Temp   97 8   Pulse  100   Resp   18   SpO2   99

## 2021-03-10 ENCOUNTER — TRANSITIONAL CARE MANAGEMENT (OUTPATIENT)
Dept: FAMILY MEDICINE CLINIC | Facility: CLINIC | Age: 41
End: 2021-03-10

## 2021-03-10 NOTE — UTILIZATION REVIEW
Notification of Discharge  This is a Notification of Discharge from our facility [address]  Please be advised that this patient has been discharge from our facility  Below you will find the admission and discharge date and time including the patients disposition  PRESENTATION DATE: 3/8/2021  9:52 PM  OBS ADMISSION DATE:   IP ADMISSION DATE: 3/8/21 2152   DISCHARGE DATE: 3/9/2021  7:04 PM  DISPOSITION: Home/Self Care Home/Self Care   Admission Orders listed below:  Admission Orders (From admission, onward)     Ordered        03/08/21 2203  INPATIENT ADMISSION  Once                   Please contact the UR Department if additional information is required to close this patient's authorization/case  [id number] [employer] Utilization Review Department  Main: [phone] x carefully listen to the prompts  All voicemails are confidential   [email or web address]  Send all requests for admission clinical reviews, approved or denied determinations and any other requests to dedicated fax number below belonging to the campus where the patient is receiving treatment   List of dedicated fax numbers:  [address] DENIALS (Administrative/Medical Necessity) [phone]   [address] (Maternity/NICU/Pediatrics) [phone] [address] [facility] [phone]   [facility] [phone]   April Oliva [phone]   Dominic Judd [facility] [address] [facility] [phone]   [facility]  [phone]   [address] [facility] [address] [facility]  [address] [phone]

## 2021-03-10 NOTE — TELEPHONE ENCOUNTER
Post Op Note    Ildefonso Morales is a 47 y o  female s/p CYSTOSCOPY URETEROSCOPY WITH LITHOTRIPSY HOLMIUM LASER, RETROGRADE PYELOGRAM AND INSERTION STENT URETERAL (Left Bladder) performed 3/9/21  Ildefonso Morales is a patient of Dr Flor Fields  How would you rate your pain on a scale from 1 to 10, 10 being the worst pain ever? 1  Have you had a fever? No  Have your bowel movements been regular? Yes  Do you have any difficulty urinating? No  Do you have any other questions or concerns that I can address at this time? Spoke extensively with patient in regards to stent discomfort measures, all medications she was discharged with and their use  Reviewed stent to remain in for 2-3 weeks  Cysto stent removal scheduled for 3/29/21, this appt works for patient  Reviewed cysto stent removal process  Also reviewed patient had US scheduled for 3/12/21  This was from her office visit on 3/1/21 when expulsive therapy was being trialed  As patient had URS, she does not need this US done  Advised I will cancel  We will schedule her for repeat imaging after cysto stent removal  US cancelled  Patient reports she will be returning to work tomorrow, will just need work note created  Advised I will email to address on file  She knows to call in the meantime with any questions/concerns  Work note created and faxed

## 2021-03-10 NOTE — TELEPHONE ENCOUNTER
Jorge An MA    6:14 PM  Note     Patient called from hospital regarding her pain medication that was prescribed after surgery by Dr Musa Do  Medication needed prior auth but she will be paying out of pocket for this      Patient would like a call tomorrow regarding a return to work letter

## 2021-03-10 NOTE — UTILIZATION REVIEW
Notification of Inpatient Admission/Inpatient Authorization Request   This is a Notification of Inpatient Admission for [address]  Be advised that this patient was admitted to our facility under Inpatient Status  Contact Melanie Boyle at [phone] for additional admission information  [facility] DEPT  DEDICATED [phone]  Patient Name:   Shelley Gates   YOB: 1980       [address]   [address]:   [facility]  Tax ID: [id number]  NPI: [id number] Attending Provider/NPI:  Phone:  Address: [phone]  Same as Ya Neves 1106 of Service Code: 24 Place of Service Name:  [address] [place] [address]   Start Date: 3/8/21 2152 Discharge Date & Time: 3/9/2021  7:04 PM    Type of Admission: Inpatient Status Discharge Disposition (if discharged): Home/Self Care   Patient Diagnoses: Kidney stone [N20 0]     Orders: Admission Orders (From admission, onward)     Ordered        03/08/21 2203  INPATIENT ADMISSION  Once                    Assigned Utilization Review Contact: Melanie Boyle  Utilization   Network Utilization Review Department  Phone: [phone]; Fax [phone]  Email: [email or web address]   ATTENTION PAYERS: Please call the assigned Utilization  directly with any questions or concerns ALL voicemails in the department are confidential  Send all requests for admission clinical reviews, approved or denied determinations and any other requests to dedicated fax number belonging to the campus where the patient is receiving treatment

## 2021-03-11 DIAGNOSIS — G25.81 RLS (RESTLESS LEGS SYNDROME): ICD-10-CM

## 2021-03-11 RX ORDER — ROPINIROLE 1 MG/1
TABLET, FILM COATED ORAL
Qty: 180 TABLET | Refills: 1 | Status: SHIPPED | OUTPATIENT
Start: 2021-03-11 | End: 2021-06-14 | Stop reason: SDUPTHER

## 2021-03-15 LAB
CALCIUM OXALATE DIHYDRATE MFR STONE IR: 10 %
COLOR STONE: NORMAL
COM MFR STONE: 70 %
COMMENT-STONE3: NORMAL
COMPOSITION: NORMAL
HYDROXYAPATITE 24H ENGDIFF UR: 20 %
LABORATORY COMMENT REPORT: NORMAL
PHOTO: NORMAL
SIZE STONE: NORMAL MM
SPEC SOURCE SUBJ: NORMAL
STONE ANALYSIS-IMP: NORMAL
WT STONE: 5 MG

## 2021-03-16 ENCOUNTER — TELEPHONE (OUTPATIENT)
Dept: UROLOGY | Facility: AMBULATORY SURGERY CENTER | Age: 41
End: 2021-03-16

## 2021-03-16 NOTE — TELEPHONE ENCOUNTER
----- Message from Derrell Kyle sent at 3/16/2021  8:36 AM EDT -----  Regarding: RE: stone analysis results  Contact: [phone]  Thanks much, will definitely discuss at follow up    ----- Message -----  From: Darin Kinney RN  Sent: 3/16/21, 8:31 AM  To: [employer]  Subject: stone analysis results    Shelby Womacks,    Your stone is mostly calcium oxalate, which is the most common type of kidney stone  General recommendations include: increase water intake to keep urine clear like water, lemon in the water can help stone fragment from sticking together and avoiding or moderating foods high in oxalate including: dark beverages (coffee, tea, howard) dark green leafy veggies, meats, etc  Please see the below general dieatary recommendation for kidney stone prevention  This can be addressed further at your upcoming FU appt  Dietary Management of Kidney Stone Disease    The dietary recommendations for most people who make kidney stones (especially the most common calcium oxalate stones) are uncomplicated and are not too tedious or bland  Most importantly, the following recommendations also promote better health for a variety of reasons  FLUIDS:  The single most important change for the majority patients is the need to greatly increase fluid intake  You should at least produce two liters (about two quarts) of urine each day  Depending on the heat outdoors and your level of physical activity, this usually means consuming ten, 10 ounce glasses (100 ounces) of fluid per day  Water is always a good choice, but other drinks including tea, coffee, soda, and juice are also allowed as long as no one beverage becomes the sole source of fluid  CALCIUM:  There is excellent evidence that calcium should not be avoided, but instead moderated    A range of 600 to 1,100 mg of calcium per day, especially consumed at meals is probably a reasonable target  (i e  2-3 dairy servings per day) This might include small servings of yogurt, milk or ice cream   This amount helps avoid over-absorption of oxalate from the digestive tract and also allows for healthy bone maintenance  SODIUM (SALT): Too much salt in your diet (both from the shaker and in the prepared foods that we buy) is bad for your blood pressure, bad for your heart, and also increases the amount of calcium in your urine  A reasonable sodium restriction to 2,000-2,500mg/day (about the amount in one teaspoon) is an excellent target  You should get into the habit of reading the Nutrients labels on all the foods that you eat and watch out for the foods that have a high sodium content (snack foods, smoked or processed foods, caned foods)  Fresh and frozen foods usually have the least amount of sodium  PROTEIN:  High protein diets from animal meat (beef, chicken, pork, fish) also increases the rate of kidney stone formation and is equally unhealthy for your heart  All patients should moderate their meat intake to 3-7 ounces per day, and particularly stay away from red meat protein  OXALATE:  Most stone-formers should avoid heavy intake of oxalate-rich foods  These include green roughage (spinach, mustard, kale), strawberries, chocolate, tea, iced tea, and nuts  In addition, heavy, excess doses of Vitamin C can also produce surges in urinary oxalate levels and should be avoided  BARE-BONES RECOMMENDATIONS:  Fluids, fluids, fluids  Low salt diet (your primary care doctor will love you)  Moderate calcium (dairy products), especially with meals  Moderate red meat intake

## 2021-03-16 NOTE — TELEPHONE ENCOUNTER
Followed up with a phone call per message in [facility] and we reviewed some stent care instructions for slight discomfort she has been having  I stated to aggressively hydrate and try using a heating pad  She has the handout of Calcium Oxylate stones and she is trying to follow and hydrate as best as she can   She is scheduled with Ervin Lockwood PA-C on 4/8/2021

## 2021-03-17 ENCOUNTER — OFFICE VISIT (OUTPATIENT)
Dept: FAMILY MEDICINE CLINIC | Facility: CLINIC | Age: 41
End: 2021-03-17
Payer: COMMERCIAL

## 2021-03-17 VITALS
BODY MASS INDEX: 25.52 KG/M2 | TEMPERATURE: 97.2 F | SYSTOLIC BLOOD PRESSURE: 114 MMHG | OXYGEN SATURATION: 98 % | RESPIRATION RATE: 18 BRPM | HEART RATE: 86 BPM | DIASTOLIC BLOOD PRESSURE: 76 MMHG | WEIGHT: 130 LBS | HEIGHT: 60 IN

## 2021-03-17 DIAGNOSIS — Z09 HOSPITAL DISCHARGE FOLLOW-UP: ICD-10-CM

## 2021-03-17 DIAGNOSIS — N20.1 URETERAL CALCULI: Primary | ICD-10-CM

## 2021-03-17 PROCEDURE — 99495 TRANSJ CARE MGMT MOD F2F 14D: CPT | Performed by: FAMILY MEDICINE

## 2021-03-17 NOTE — PROGRESS NOTES
Assessment/Plan:          Problem List Items Addressed This Visit        Genitourinary    Ureteral calculi - Primary      Other Visit Diagnoses     Hospital discharge follow-up             Doing better after recent hospitalization for kidney stones  Stent removal planned for 3/29  She will F/U with Urology    Subjective:     Patient ID: Ana Chapman is a 47 y o  female  47year old here for hospital follow up  She had CT scan on 2/24 which showed 5 mm kidney stone  She saw urology was advised to take Flomax and stone would pass  She developed worsening symptoms with flank pain, had repeat CT scan which showed:  7 mm left ureterovesical junction obstructing calculus causing mild left hydroureteronephrosis  She took pain medications, rested  Veronica Pal did not pass  Eventually was admitted to [facility] and urology took her to the OR to remove the stone, and placed a stent  She is feeling okay- has some discomfort in flank/pelvic area  She is eating, drinking, urinating and moving her bowels  Denies fevers, chills  Review of Systems   Constitutional: Negative for chills and fever  Genitourinary: Positive for flank pain and pelvic pain  Negative for difficulty urinating, dyspareunia, dysuria, frequency, hematuria and urgency  Objective:     Physical Exam  Vitals signs and nursing note reviewed  Constitutional:       General: She is not in acute distress  Appearance: Normal appearance  She is not ill-appearing, toxic-appearing or diaphoretic  HENT:      Head: Normocephalic and atraumatic  Right Ear: External ear normal       Left Ear: External ear normal    Cardiovascular:      Rate and Rhythm: Normal rate and regular rhythm  Heart sounds: No murmur  No friction rub  Pulmonary:      Effort: Pulmonary effort is normal  No respiratory distress  Breath sounds: Normal breath sounds  No stridor  No wheezing, rhonchi or rales  Abdominal:      General: There is no distension  Palpations: Abdomen is soft  Tenderness: There is no abdominal tenderness  Neurological:      General: No focal deficit present  Mental Status: She is alert  Mental status is at baseline  Psychiatric:         Attention and Perception: Attention normal          Mood and Affect: Mood normal          Speech: Speech normal          Behavior: Behavior normal          Thought Content: Thought content normal          Judgment: Judgment normal            Vitals:    03/17/21 1129   BP: 114/76   BP Location: Left arm   Patient Position: Sitting   Cuff Size: Standard   Pulse: 86   Resp: 18   Temp: (!) 97 2 °F (36 2 °C)   SpO2: 98%   Weight: 59 kg (130 lb)   Height: 5' (1 524 m)       Transitional Care Management Review:  Paula Matute is a 47 y o  female here for TCM follow up  During the TCM phone call patient stated:    TCM Call (since 2/14/2021)     Date and time call was made  3/10/2021  8:34 AM    Patient was hospitial[facility] at  [facility]        Date of Admission  03/08/21    Date of discharge  03/09/21    Diagnosis  kidney stone    Disposition  Home    Current Symptoms  None      TCM Call (since 2/14/2021)     Scheduled for follow up?   Yes    Patients specialists  Urologist    Did you obtain your prescribed medications  Yes    Do you need help managing your prescriptions or medications  No    Is transportation to your appointment needed  No    I have advised the patient to call PCP with any new or worsening symptoms  pat gagnon    Living Arrangements  Family members    Comments  appt 03/17/2021          Kami Granados MD

## 2021-03-29 ENCOUNTER — PROCEDURE VISIT (OUTPATIENT)
Dept: UROLOGY | Facility: CLINIC | Age: 41
End: 2021-03-29
Payer: COMMERCIAL

## 2021-03-29 VITALS
HEIGHT: 60 IN | BODY MASS INDEX: 26.31 KG/M2 | HEART RATE: 83 BPM | WEIGHT: 134 LBS | DIASTOLIC BLOOD PRESSURE: 70 MMHG | SYSTOLIC BLOOD PRESSURE: 122 MMHG

## 2021-03-29 DIAGNOSIS — N20.0 NEPHROLITHIASIS: Primary | ICD-10-CM

## 2021-03-29 PROCEDURE — 52310 CYSTOSCOPY AND TREATMENT: CPT | Performed by: PHYSICIAN ASSISTANT

## 2021-03-29 NOTE — PROGRESS NOTES
Cystoscopy     Date/Time 3/29/2021 11:36 AM     Performed by  Ranjit Camargo PA-C     Authorized by Ranjit Camargo PA-C      Universal Protocol:  Consent: Verbal consent obtained  Written consent obtained  Consent given by: patient        Procedure Details:  Procedure type: simple removal of a foreign body, stone, or stent    Patient tolerance: Patient tolerated the procedure well with no immediate complications    Additional Procedure Details: Patient was prepped with Betadine and 2% lidocaine jelly was instilled per urethra  [de-identified] Micronesian cystoscope was placed and the bladder was filled  The ureteral stent was identified, grasped with a grasper, and removed intact without difficulty  The patient tolerated this procedure well  Will provide 3 days of antibiotics for prophylaxis for stent removal        Assessment/plan:  1   Left ureteral stone (impacted) s/p ureteroscopy 3/9/2020  - Successful stent removal in the office today  -  Patient counseled on renal colic over the next 75-35 hours  -  Follow-up in 6 weeks with KUB and ultrasound prior to visit

## 2021-05-11 ENCOUNTER — HOSPITAL ENCOUNTER (OUTPATIENT)
Dept: ULTRASOUND IMAGING | Facility: HOSPITAL | Age: 41
Discharge: HOME | End: 2021-05-11
Payer: COMMERCIAL

## 2021-05-11 DIAGNOSIS — N20.0 NEPHROLITHIASIS: ICD-10-CM

## 2021-05-11 PROCEDURE — 76770 US EXAM ABDO BACK WALL COMP: CPT

## 2021-05-16 NOTE — PROGRESS NOTES
Assessment/Plan:    Ureteral calculi  -  Patient is status post ureteroscopy with Dr Artis Ashraf on 03/09/2021 for a 7 mm left UVJ stone with mild left hydroureteronephrosis  With highly impacted left distal ureteral calculus  Underwent stent removal on 063/29/2021     -  Presenting with repeat imaging after stent removal   Ultrasound negative for any additional renal calculi or hydronephrosis  -  Discussed dietary recommendations including increase fluid intake including water, water with lemon, lemonade, orange juice  Provided dietary handout check out     -  Discussed with patient will follow-up in 1 year with additional ultrasound for evaluation of development of stones  If patient continues to develop stones discussed referral to Nephrology for evaluation     -  Discussed that should patient have any issues in the interim that she may call for sooner appointment and evaluation  Discussed ER parameters  -  Patient is currently agreeable to plan  Problem List Items Addressed This Visit        Genitourinary    Ureteral calculi     -  Patient is status post ureteroscopy with Dr Artis Ashraf on 03/09/2021 for a 7 mm left UVJ stone with mild left hydroureteronephrosis  With highly impacted left distal ureteral calculus  Underwent stent removal on 063/29/2021     -  Presenting with repeat imaging after stent removal   Ultrasound negative for any additional renal calculi or hydronephrosis  -  Discussed dietary recommendations including increase fluid intake including water, water with lemon, lemonade, orange juice  Provided dietary handout check out     -  Discussed with patient will follow-up in 1 year with additional ultrasound for evaluation of development of stones  If patient continues to develop stones discussed referral to Nephrology for evaluation     -  Discussed that should patient have any issues in the interim that she may call for sooner appointment and evaluation    Discussed ER parameters  -  Patient is currently agreeable to plan  Other Visit Diagnoses     Nephrolithiasis    -  Primary    Relevant Orders    POCT urine dip (Completed)    US kidney and bladder            Subjective:      Patient ID: Samara Pope is a 47 y o  female  HPI   Patient is a 47year old female with a urologic history of nephrolithiasis status post ureteroscopy with Dr Lolis Gonzales on 03/09/2021   For a 7 mm left UVJ stone with mild left hydroureteronephrosis  Her operation note, highly impacted left distal ureteral calculus a stent without a string was placed and cysto with stent removal was performed on 03/29/2021  Patient now presenting for a 4-6 week follow-up with ultrasound and KUB for evaluation of stone burden and hydronephrosis  Stone analysis coming back as calcium oxalate stones  Ultrasound currently negative for any renal calculi as well as hydronephrosis  Patient currently reporting that she is asymptomatic at this time  She has not had any additional symptoms of having kidney stones since for stent removal     The following portions of the patient's history were reviewed and updated as appropriate:     She  has a past medical history of Cataract, Hot flashes, Hypertension, Kidney stone, Migraine, and Restless leg syndrome  She   Patient Active Problem List    Diagnosis Date Noted    Ureteral calculi 03/08/2021    Cataract of left eye 09/21/2020    Essential hypertension 03/20/2019    Migraine without aura and without status migrainosus, not intractable 03/20/2019    Congenital pes planus 07/19/2018    Acquired deformity of right foot 07/19/2018    Congenital pes planus of right foot 06/25/2018    Congenital pes planus of left foot 06/25/2018     She  has a past surgical history that includes Eye surgery; [place] tooth extraction (Bilateral); pr cysto/uretero w/lithotripsy &indwell stent insrt (Left, 3/9/2021); and [place] retrograde pyelogram (3/9/2021)    Her family history includes Alzheimer's disease in her maternal grandfather and maternal grandmother; Diabetes in her maternal grandmother; Hypertension in her father and mother; No Known Problems in her daughter; Thyroid disease in her mother; Uterine cancer in her mother  She  reports that she has never smoked  She has never used smokeless tobacco  She reports current alcohol use  She reports that she does not use drugs  Current Outpatient Medications   Medication Sig Dispense Refill    Cholecalciferol (VITAMIN D3) 1000 units CAPS Take 1 capsule by mouth daily       hydrochlorothiazide (HYDRODIURIL) 25 mg tablet Take 1 tablet (25 mg total) by mouth daily 90 tablet 0    loratadine (CLARITIN) 10 mg tablet Take 1 tablet by mouth daily      Propylene Glycol (SYSTANE COMPLETE OP) Apply to eye      rizatriptan (MAXALT) 10 MG tablet Take 1 tablet (10 mg total) by mouth once as needed for migraine for up to 1 dose May repeat in 2 hours if needed 9 tablet 0    rOPINIRole (REQUIP) 1 mg tablet Take 2 tabs at 7pm 180 tablet 1    vitamin E, tocopherol, 400 units capsule Take 400 Units by mouth daily      EPINEPHrine (EPIPEN) 0 3 mg/0 3 mL SOAJ Inject 0 3 mL (0 3 mg total) into a muscle once for 1 dose 0 6 mL 0     No current facility-administered medications for this visit  She is allergic to amoxicillin and seasonal ic [cholestatin]       Review of Systems   Constitutional: Negative  Negative for chills and fever  HENT: Negative  Eyes: Negative  Respiratory: Negative  Cardiovascular: Negative  Gastrointestinal: Negative  Negative for abdominal pain, nausea and vomiting  Endocrine: Negative  Genitourinary: Negative  Negative for difficulty urinating, dysuria, flank pain, frequency and urgency  Musculoskeletal: Negative  Skin: Negative  Allergic/Immunologic: Negative  Neurological: Negative  Hematological: Negative  Psychiatric/Behavioral: Negative            Objective:      /72 Pulse 73   Ht 5' (1 524 m)   Wt 60 3 kg (133 lb)   BMI 25 97 kg/m²          Physical Exam  Constitutional:       General: She is not in acute distress  Appearance: Normal appearance  She is normal weight  She is not ill-appearing, toxic-appearing or diaphoretic  HENT:      Head: Normocephalic and atraumatic  Right Ear: External ear normal       Left Ear: External ear normal       Nose: Nose normal    Eyes:      General: No scleral icterus  Conjunctiva/sclera: Conjunctivae normal    Neck:      Musculoskeletal: Normal range of motion  Cardiovascular:      Rate and Rhythm: Normal rate and regular rhythm  Pulses: Normal pulses  Pulmonary:      Effort: Pulmonary effort is normal  No respiratory distress  Musculoskeletal: Normal range of motion  Skin:     General: Skin is warm and dry  Neurological:      General: No focal deficit present  Mental Status: She is alert and oriented to person, place, and time  Psychiatric:         Mood and Affect: Mood normal          Behavior: Behavior normal          Thought Content:  Thought content normal          Judgment: Judgment normal            Philippe Garcia PA-C

## 2021-05-17 ENCOUNTER — TELEPHONE (OUTPATIENT)
Dept: UROLOGY | Facility: CLINIC | Age: 41
End: 2021-05-17

## 2021-05-17 ENCOUNTER — OFFICE VISIT (OUTPATIENT)
Dept: UROLOGY | Facility: CLINIC | Age: 41
End: 2021-05-17
Payer: COMMERCIAL

## 2021-05-17 VITALS
HEART RATE: 73 BPM | DIASTOLIC BLOOD PRESSURE: 72 MMHG | HEIGHT: 60 IN | WEIGHT: 133 LBS | SYSTOLIC BLOOD PRESSURE: 128 MMHG | BODY MASS INDEX: 26.11 KG/M2

## 2021-05-17 DIAGNOSIS — N20.0 NEPHROLITHIASIS: Primary | ICD-10-CM

## 2021-05-17 DIAGNOSIS — N20.1 URETERAL CALCULI: ICD-10-CM

## 2021-05-17 LAB
SL AMB  POCT GLUCOSE, UA: NORMAL
SL AMB LEUKOCYTE ESTERASE,UA: NORMAL
SL AMB POCT BILIRUBIN,UA: NORMAL
SL AMB POCT BLOOD,UA: NORMAL
SL AMB POCT CLARITY,UA: CLEAR
SL AMB POCT COLOR,UA: YELLOW
SL AMB POCT KETONES,UA: NORMAL
SL AMB POCT NITRITE,UA: NORMAL
SL AMB POCT PH,UA: 5
SL AMB POCT SPECIFIC GRAVITY,UA: 1.03
SL AMB POCT URINE PROTEIN: NORMAL
SL AMB POCT UROBILINOGEN: 0.2

## 2021-05-17 PROCEDURE — 81002 URINALYSIS NONAUTO W/O SCOPE: CPT | Performed by: PHYSICIAN ASSISTANT

## 2021-05-17 PROCEDURE — 99213 OFFICE O/P EST LOW 20 MIN: CPT | Performed by: PHYSICIAN ASSISTANT

## 2021-05-17 NOTE — ASSESSMENT & PLAN NOTE
-  Patient is status post ureteroscopy with Dr Scott Jovel on 03/09/2021 for a 7 mm left UVJ stone with mild left hydroureteronephrosis  With highly impacted left distal ureteral calculus  Underwent stent removal on 063/29/2021     -  Presenting with repeat imaging after stent removal   Ultrasound negative for any additional renal calculi or hydronephrosis  -  Discussed dietary recommendations including increase fluid intake including water, water with lemon, lemonade, orange juice  Provided dietary handout check out     -  Discussed with patient will follow-up in 1 year with additional ultrasound for evaluation of development of stones  If patient continues to develop stones discussed referral to Nephrology for evaluation     -  Discussed that should patient have any issues in the interim that she may call for sooner appointment and evaluation  Discussed ER parameters  -  Patient is currently agreeable to plan

## 2021-05-17 NOTE — TELEPHONE ENCOUNTER
Return in about 1 year (around 5/17/2022) for US ptv ( kidney stone follow up) , dietary handout     ( employee)

## 2021-06-03 DIAGNOSIS — Z91.030 BEE STING ALLERGY: ICD-10-CM

## 2021-06-03 RX ORDER — EPINEPHRINE 0.3 MG/.3ML
0.3 INJECTION SUBCUTANEOUS ONCE
Qty: 0.6 ML | Refills: 0 | Status: SHIPPED | OUTPATIENT
Start: 2021-06-03 | End: 2022-06-14 | Stop reason: SDUPTHER

## 2021-06-12 ENCOUNTER — TRANSCRIBE ORDERS (OUTPATIENT)
Dept: ADMINISTRATIVE | Facility: HOSPITAL | Age: 41
End: 2021-06-12

## 2021-06-12 ENCOUNTER — APPOINTMENT (OUTPATIENT)
Dept: LAB | Facility: CLINIC | Age: 41
End: 2021-06-12
Payer: COMMERCIAL

## 2021-06-12 DIAGNOSIS — Z00.8 ENCOUNTER FOR OTHER GENERAL EXAMINATION: Primary | ICD-10-CM

## 2021-06-12 DIAGNOSIS — R29.2: ICD-10-CM

## 2021-06-12 DIAGNOSIS — Z00.8 ENCOUNTER FOR OTHER GENERAL EXAMINATION: ICD-10-CM

## 2021-06-12 DIAGNOSIS — G25.81 RESTLESS LEGS SYNDROME (RLS): ICD-10-CM

## 2021-06-12 DIAGNOSIS — Z29.9 PREVENTIVE MEASURE: ICD-10-CM

## 2021-06-12 LAB
25(OH)D3 SERPL-MCNC: 23.6 NG/ML (ref 30–100)
ALBUMIN SERPL BCP-MCNC: 4.3 G/DL (ref 3.5–5)
ALP SERPL-CCNC: 64 U/L (ref 46–116)
ALT SERPL W P-5'-P-CCNC: 30 U/L (ref 12–78)
ANION GAP SERPL CALCULATED.3IONS-SCNC: 4 MMOL/L (ref 4–13)
AST SERPL W P-5'-P-CCNC: 15 U/L (ref 5–45)
BASOPHILS # BLD AUTO: 0.03 THOUSANDS/ΜL (ref 0–0.1)
BASOPHILS NFR BLD AUTO: 0 % (ref 0–1)
BILIRUB SERPL-MCNC: 0.87 MG/DL (ref 0.2–1)
BUN SERPL-MCNC: 13 MG/DL (ref 5–25)
CALCIUM SERPL-MCNC: 9.8 MG/DL (ref 8.3–10.1)
CHLORIDE SERPL-SCNC: 102 MMOL/L (ref 100–108)
CHOLEST SERPL-MCNC: 198 MG/DL (ref 50–200)
CO2 SERPL-SCNC: 30 MMOL/L (ref 21–32)
CREAT SERPL-MCNC: 0.7 MG/DL (ref 0.6–1.3)
EOSINOPHIL # BLD AUTO: 0.15 THOUSAND/ΜL (ref 0–0.61)
EOSINOPHIL NFR BLD AUTO: 2 % (ref 0–6)
ERYTHROCYTE [DISTWIDTH] IN BLOOD BY AUTOMATED COUNT: 12 % (ref 11.6–15.1)
EST. AVERAGE GLUCOSE BLD GHB EST-MCNC: 100 MG/DL
FERRITIN SERPL-MCNC: 66 NG/ML (ref 8–388)
GFR SERPL CREATININE-BSD FRML MDRD: 110 ML/MIN/1.73SQ M
GLUCOSE P FAST SERPL-MCNC: 88 MG/DL (ref 65–99)
HBA1C MFR BLD: 5.1 %
HCT VFR BLD AUTO: 45.1 % (ref 34.8–46.1)
HDLC SERPL-MCNC: 38 MG/DL
HGB BLD-MCNC: 15.2 G/DL (ref 11.5–15.4)
IMM GRANULOCYTES # BLD AUTO: 0.04 THOUSAND/UL (ref 0–0.2)
IMM GRANULOCYTES NFR BLD AUTO: 0 % (ref 0–2)
IRON SATN MFR SERPL: 29 %
IRON SERPL-MCNC: 117 UG/DL (ref 50–170)
LDLC SERPL CALC-MCNC: 133 MG/DL (ref 0–100)
LYMPHOCYTES # BLD AUTO: 2.14 THOUSANDS/ΜL (ref 0.6–4.47)
LYMPHOCYTES NFR BLD AUTO: 21 % (ref 14–44)
MCH RBC QN AUTO: 29.4 PG (ref 26.8–34.3)
MCHC RBC AUTO-ENTMCNC: 33.7 G/DL (ref 31.4–37.4)
MCV RBC AUTO: 87 FL (ref 82–98)
MONOCYTES # BLD AUTO: 0.79 THOUSAND/ΜL (ref 0.17–1.22)
MONOCYTES NFR BLD AUTO: 8 % (ref 4–12)
NEUTROPHILS # BLD AUTO: 7.02 THOUSANDS/ΜL (ref 1.85–7.62)
NEUTS SEG NFR BLD AUTO: 69 % (ref 43–75)
NONHDLC SERPL-MCNC: 160 MG/DL
NRBC BLD AUTO-RTO: 0 /100 WBCS
PLATELET # BLD AUTO: 330 THOUSANDS/UL (ref 149–390)
PMV BLD AUTO: 11.8 FL (ref 8.9–12.7)
POTASSIUM SERPL-SCNC: 3.5 MMOL/L (ref 3.5–5.3)
PROT SERPL-MCNC: 7.5 G/DL (ref 6.4–8.2)
RBC # BLD AUTO: 5.17 MILLION/UL (ref 3.81–5.12)
SODIUM SERPL-SCNC: 136 MMOL/L (ref 136–145)
TIBC SERPL-MCNC: 410 UG/DL (ref 250–450)
TRIGL SERPL-MCNC: 137 MG/DL
TSH SERPL DL<=0.05 MIU/L-ACNC: 0.86 UIU/ML (ref 0.36–3.74)
VIT B12 SERPL-MCNC: 469 PG/ML (ref 100–900)
WBC # BLD AUTO: 10.17 THOUSAND/UL (ref 4.31–10.16)

## 2021-06-12 PROCEDURE — 82728 ASSAY OF FERRITIN: CPT

## 2021-06-12 PROCEDURE — 36415 COLL VENOUS BLD VENIPUNCTURE: CPT

## 2021-06-12 PROCEDURE — 83550 IRON BINDING TEST: CPT

## 2021-06-12 PROCEDURE — 82306 VITAMIN D 25 HYDROXY: CPT

## 2021-06-12 PROCEDURE — 83036 HEMOGLOBIN GLYCOSYLATED A1C: CPT

## 2021-06-12 PROCEDURE — 84443 ASSAY THYROID STIM HORMONE: CPT

## 2021-06-12 PROCEDURE — 85025 COMPLETE CBC W/AUTO DIFF WBC: CPT

## 2021-06-12 PROCEDURE — 82607 VITAMIN B-12: CPT

## 2021-06-12 PROCEDURE — 80053 COMPREHEN METABOLIC PANEL: CPT

## 2021-06-12 PROCEDURE — 86376 MICROSOMAL ANTIBODY EACH: CPT

## 2021-06-12 PROCEDURE — 83540 ASSAY OF IRON: CPT

## 2021-06-12 PROCEDURE — 80061 LIPID PANEL: CPT

## 2021-06-14 DIAGNOSIS — G25.81 RLS (RESTLESS LEGS SYNDROME): ICD-10-CM

## 2021-06-14 DIAGNOSIS — I10 HYPERTENSION, UNSPECIFIED TYPE: ICD-10-CM

## 2021-06-14 LAB — THYROPEROXIDASE AB SERPL-ACNC: <9 IU/ML (ref 0–34)

## 2021-06-14 RX ORDER — ROPINIROLE 1 MG/1
TABLET, FILM COATED ORAL
Qty: 180 TABLET | Refills: 3 | Status: SHIPPED | OUTPATIENT
Start: 2021-06-14 | End: 2022-06-14 | Stop reason: SDUPTHER

## 2021-06-15 RX ORDER — HYDROCHLOROTHIAZIDE 25 MG/1
25 TABLET ORAL DAILY
Qty: 90 TABLET | Refills: 0 | Status: SHIPPED | OUTPATIENT
Start: 2021-06-15 | End: 2021-09-22 | Stop reason: SDUPTHER

## 2021-09-22 DIAGNOSIS — I10 HYPERTENSION, UNSPECIFIED TYPE: ICD-10-CM

## 2021-09-22 RX ORDER — HYDROCHLOROTHIAZIDE 25 MG/1
25 TABLET ORAL DAILY
Qty: 90 TABLET | Refills: 0 | Status: SHIPPED | OUTPATIENT
Start: 2021-09-22 | End: 2021-12-09 | Stop reason: SDUPTHER

## 2021-10-05 ENCOUNTER — ANNUAL EXAM (OUTPATIENT)
Dept: OBGYN CLINIC | Facility: MEDICAL CENTER | Age: 41
End: 2021-10-05
Payer: COMMERCIAL

## 2021-10-05 VITALS
WEIGHT: 137.2 LBS | SYSTOLIC BLOOD PRESSURE: 112 MMHG | BODY MASS INDEX: 26.93 KG/M2 | HEIGHT: 60 IN | DIASTOLIC BLOOD PRESSURE: 78 MMHG

## 2021-10-05 DIAGNOSIS — Z11.51 SCREENING FOR HPV (HUMAN PAPILLOMAVIRUS): ICD-10-CM

## 2021-10-05 DIAGNOSIS — Z01.419 ENCOUNTER FOR GYNECOLOGICAL EXAMINATION (GENERAL) (ROUTINE) WITHOUT ABNORMAL FINDINGS: Primary | ICD-10-CM

## 2021-10-05 DIAGNOSIS — Z12.31 ENCOUNTER FOR SCREENING MAMMOGRAM FOR MALIGNANT NEOPLASM OF BREAST: ICD-10-CM

## 2021-10-05 PROCEDURE — G0476 HPV COMBO ASSAY CA SCREEN: HCPCS | Performed by: NURSE PRACTITIONER

## 2021-10-05 PROCEDURE — G0145 SCR C/V CYTO,THINLAYER,RESCR: HCPCS | Performed by: NURSE PRACTITIONER

## 2021-10-05 PROCEDURE — 99395 PREV VISIT EST AGE 18-39: CPT | Performed by: NURSE PRACTITIONER

## 2021-10-07 LAB
HPV HR 12 DNA CVX QL NAA+PROBE: NEGATIVE
HPV16 DNA CVX QL NAA+PROBE: NEGATIVE
HPV18 DNA CVX QL NAA+PROBE: NEGATIVE

## 2021-10-08 ENCOUNTER — IMMUNIZATIONS (OUTPATIENT)
Dept: FAMILY MEDICINE CLINIC | Facility: HOSPITAL | Age: 41
End: 2021-10-08

## 2021-10-08 DIAGNOSIS — Z23 ENCOUNTER FOR IMMUNIZATION: Primary | ICD-10-CM

## 2021-10-08 PROCEDURE — 0001A SARS-COV-2 / COVID-19 MRNA VACCINE (PFIZER-BIONTECH) 30 MCG: CPT

## 2021-10-08 PROCEDURE — 91300 SARS-COV-2 / COVID-19 MRNA VACCINE (PFIZER-BIONTECH) 30 MCG: CPT

## 2021-10-11 LAB
LAB AP GYN PRIMARY INTERPRETATION: NORMAL
Lab: NORMAL
PATH INTERP SPEC-IMP: NORMAL

## 2021-10-12 ENCOUNTER — TELEPHONE (OUTPATIENT)
Dept: OBGYN CLINIC | Facility: MEDICAL CENTER | Age: 41
End: 2021-10-12

## 2021-10-12 ENCOUNTER — TELEPHONE (OUTPATIENT)
Dept: OBGYN CLINIC | Facility: CLINIC | Age: 41
End: 2021-10-12

## 2021-12-09 DIAGNOSIS — I10 HYPERTENSION, UNSPECIFIED TYPE: ICD-10-CM

## 2021-12-10 RX ORDER — HYDROCHLOROTHIAZIDE 25 MG/1
25 TABLET ORAL DAILY
Qty: 90 TABLET | Refills: 0 | Status: SHIPPED | OUTPATIENT
Start: 2021-12-10 | End: 2022-04-01 | Stop reason: SDUPTHER

## 2022-02-07 ENCOUNTER — TELEPHONE (OUTPATIENT)
Dept: FAMILY MEDICINE CLINIC | Facility: CLINIC | Age: 42
End: 2022-02-07

## 2022-02-07 DIAGNOSIS — Z11.59 SCREENING FOR VIRAL DISEASE: Primary | ICD-10-CM

## 2022-02-07 PROCEDURE — U0003 INFECTIOUS AGENT DETECTION BY NUCLEIC ACID (DNA OR RNA); SEVERE ACUTE RESPIRATORY SYNDROME CORONAVIRUS 2 (SARS-COV-2) (CORONAVIRUS DISEASE [COVID-19]), AMPLIFIED PROBE TECHNIQUE, MAKING USE OF HIGH THROUGHPUT TECHNOLOGIES AS DESCRIBED BY CMS-2020-01-R: HCPCS | Performed by: FAMILY MEDICINE

## 2022-02-07 PROCEDURE — U0005 INFEC AGEN DETEC AMPLI PROBE: HCPCS | Performed by: FAMILY MEDICINE

## 2022-02-07 NOTE — TELEPHONE ENCOUNTER
Pt has been experiencing some nausea this morning  She does not have any other symptoms  She does feel cold but does not have a fever  She is a  employee  Please order appropriately and notify pt- thanks!

## 2022-02-08 LAB — SARS-COV-2 RNA RESP QL NAA+PROBE: NEGATIVE

## 2022-02-17 ENCOUNTER — HOSPITAL ENCOUNTER (OUTPATIENT)
Dept: RADIOLOGY | Facility: HOSPITAL | Age: 42
Discharge: HOME | End: 2022-02-17

## 2022-02-17 DIAGNOSIS — S69.91XA INJURY OF RIGHT WRIST, INITIAL ENCOUNTER: ICD-10-CM

## 2022-04-01 DIAGNOSIS — I10 HYPERTENSION, UNSPECIFIED TYPE: ICD-10-CM

## 2022-04-01 RX ORDER — HYDROCHLOROTHIAZIDE 25 MG/1
25 TABLET ORAL DAILY
Qty: 90 TABLET | Refills: 0 | Status: SHIPPED | OUTPATIENT
Start: 2022-04-01 | End: 2022-04-01

## 2022-04-01 RX ORDER — HYDROCHLOROTHIAZIDE 25 MG/1
TABLET ORAL
Qty: 90 TABLET | Refills: 0 | Status: SHIPPED | OUTPATIENT
Start: 2022-04-01 | End: 2022-06-14 | Stop reason: SDUPTHER

## 2022-04-06 ENCOUNTER — TELEPHONE (OUTPATIENT)
Dept: OBGYN CLINIC | Facility: CLINIC | Age: 42
End: 2022-04-06

## 2022-04-06 NOTE — TELEPHONE ENCOUNTER
----- Message from Caleb Arriola MD sent at 4/6/2022  2:19 PM EDT -----  Regarding: FW: Abnormal bleeding   Please have patient seen for office visit and possible endometrial biopsy given this history, though this is likely just an anovulatory cycle     ----- Message -----  From: Samuel Dee  Sent: 4/6/2022   1:14 PM EDT  To: Caleb Arriola MD  Subject: FW: Abnormal bleeding                            Should pt come in for OVS or other recc first? Please advise   ----- Message -----  From: RUMA Garcia  Sent: 4/6/2022   1:12 PM EDT  To: [facility]  Subject: FW: Abnormal bleeding                             I have never seen this patient  Can we arrange for an office visit for her please or send to the on call physician? Thanks  ----- Message -----  From: Samuel Dee  Sent: 4/6/2022   1:01 PM EDT  To: RUMA Garcia  Subject: FW: Abnormal bleeding                              ----- Message -----  From: Jessica Santos  Sent: 4/6/2022  12:03 PM EDT  To: [facility]  Subject: Abnormal bleeding                                I recently had my period 3/26/22 through 3/31/22  All seemed normal    Today, 4/6/22, I felt like I was leaking, went to the bathroom and after wiping I noticed dark reddish brown blood  It has progressed in amount through the morning and just now went to the bathroom and again, my panty liner was covered in dark, reddish brown blood  I am somewhat crampy too  Not sure what to do    My Mother has a history of uterine cancer, diagnosed in her 63's  My cell is [phone]  Would appreciate a call back today  Thank you

## 2022-04-14 ENCOUNTER — OFFICE VISIT (OUTPATIENT)
Dept: OBGYN CLINIC | Age: 42
End: 2022-04-14
Payer: COMMERCIAL

## 2022-04-14 VITALS
SYSTOLIC BLOOD PRESSURE: 116 MMHG | HEIGHT: 61 IN | BODY MASS INDEX: 25.11 KG/M2 | WEIGHT: 133 LBS | DIASTOLIC BLOOD PRESSURE: 76 MMHG

## 2022-04-14 DIAGNOSIS — N89.8 VAGINAL ODOR: ICD-10-CM

## 2022-04-14 DIAGNOSIS — Z11.3 SCREEN FOR STD (SEXUALLY TRANSMITTED DISEASE): ICD-10-CM

## 2022-04-14 DIAGNOSIS — N92.6 IRREGULAR MENSES: Primary | ICD-10-CM

## 2022-04-14 DIAGNOSIS — Z80.49 FAMILY HISTORY OF UTERINE CANCER: ICD-10-CM

## 2022-04-14 PROBLEM — Q66.50 CONGENITAL PES PLANUS: Status: RESOLVED | Noted: 2018-07-19 | Resolved: 2022-04-14

## 2022-04-14 PROBLEM — Q66.52 CONGENITAL PES PLANUS OF LEFT FOOT: Status: RESOLVED | Noted: 2018-06-25 | Resolved: 2022-04-14

## 2022-04-14 PROBLEM — Q66.51 CONGENITAL PES PLANUS OF RIGHT FOOT: Status: RESOLVED | Noted: 2018-06-25 | Resolved: 2022-04-14

## 2022-04-14 PROBLEM — M21.961 ACQUIRED DEFORMITY OF RIGHT FOOT: Status: RESOLVED | Noted: 2018-07-19 | Resolved: 2022-04-14

## 2022-04-14 PROCEDURE — 99213 OFFICE O/P EST LOW 20 MIN: CPT | Performed by: NURSE PRACTITIONER

## 2022-04-14 PROCEDURE — 87591 N.GONORRHOEAE DNA AMP PROB: CPT | Performed by: NURSE PRACTITIONER

## 2022-04-14 PROCEDURE — 87491 CHLMYD TRACH DNA AMP PROBE: CPT | Performed by: NURSE PRACTITIONER

## 2022-04-14 RX ORDER — METRONIDAZOLE 7.5 MG/G
1 GEL VAGINAL
Qty: 45 G | Refills: 0 | Status: SHIPPED | OUTPATIENT
Start: 2022-04-14 | End: 2022-04-19

## 2022-04-14 NOTE — PATIENT INSTRUCTIONS
Patient will continue with the menses calendar  She will obtain pelvic ultrasound towards the end of her next menses  She was advised to call with any menstrual irregularities  Endometrial biopsy was discussed

## 2022-04-14 NOTE — PROGRESS NOTES
Diagnoses and all orders for this visit:    Irregular menses    Family history of uterine cancer  -     US pelvis complete w transvaginal; Future    Vaginal odor  -     metroNIDAZOLE (METROGEL) 0 75 % vaginal gel; Insert 1 application into the vagina daily at bedtime for 5 days    Screen for STD (sexually transmitted disease)  -     Chlamydia/GC amplified DNA by PCR        Call if no symptom improvement, all questions answered, return for annual in 6 months  Continue menses calendar  Pleasant 46 y o  here for vaginal complaints of odor and irregular menses last month with some BTB  She denies any treatments tried  She denies recent antibiotic use  She denies douching  She denies fever, pelvic pain or dyspareunia  She denies new sexual partners  She is monogamous x 3 yrs with a female  She is concerned about uterine cancer because her mom was diagnosed at 61years old  She is willing to do an EMB if her irregular menses continue but it was deferred today  We will start workup with an ultrasound  She agreed to STD testing      Past Medical History:   Diagnosis Date    Cataract     left eye    Hot flashes     Hypertension     Kidney stone     Migraine     menstural migraines    Restless leg syndrome      Past Surgical History:   Procedure Laterality Date    CATARACT EXTRACTION Left     EYE SURGERY      FL RETROGRADE PYELOGRAM  03/09/2021    [place] CYSTO/URETERO W/LITHOTRIPSY &INDWELL STENT INSRT Left 03/09/2021    Procedure: CYSTOSCOPY URETEROSCOPY WITH LITHOTRIPSY HOLMIUM LASER, RETROGRADE PYELOGRAM AND INSERTION STENT URETERAL;  Surgeon: Libby Vance MD;  Location:  MAIN OR;  Service: Urology    WISDOM TOOTH EXTRACTION Bilateral      Social History     Tobacco Use    Smoking status: Never Smoker    Smokeless tobacco: Never Used   Vaping Use    Vaping Use: Never used   Substance Use Topics    Alcohol use: Yes     Comment: social    Drug use: No     Family History   Problem Relation Age of Onset    Hypertension Mother     Thyroid disease Mother     Uterine cancer Mother     Hypertension Father     Diabetes Father     Diabetes Maternal Grandmother     Alzheimer's disease Maternal Grandmother     Alzheimer's disease Maternal Grandfather     Heart disease Paternal Grandfather     No Known Problems Daughter        Current Outpatient Medications:     Cholecalciferol (VITAMIN D3) 1000 units CAPS, Take 1 capsule by mouth daily , Disp: , Rfl:     EPINEPHrine (EPIPEN) 0 3 mg/0 3 mL SOAJ, Inject 0 3 mL (0 3 mg total) into a muscle once for 1 dose, Disp: 0 6 mL, Rfl: 0    hydrochlorothiazide (HYDRODIURIL) 25 mg tablet, TAKE ONE TABLET BY MOUTH EVERY DAY, Disp: 90 tablet, Rfl: 0    loratadine (CLARITIN) 10 mg tablet, Take 1 tablet by mouth daily, Disp: , Rfl:     Propylene Glycol (SYSTANE COMPLETE OP), Apply to eye, Disp: , Rfl:     rOPINIRole (REQUIP) 1 mg tablet, Take 2 tabs at 7pm, Disp: 180 tablet, Rfl: 3    metroNIDAZOLE (METROGEL) 0 75 % vaginal gel, Insert 1 application into the vagina daily at bedtime for 5 days, Disp: 45 g, Rfl: 0    Allergies   Allergen Reactions    Amoxicillin Edema     Category: Allergy;     Seasonal Ic [Cholestatin] Sneezing     OB History    Para Term  AB Living   2 1 1   1 1   SAB IAB Ectopic Multiple Live Births     1     1      # Outcome Date GA Lbr Sagar/2nd Weight Sex Delivery Anes PTL Lv   2 IAB            1 Term     F Vag-Spont   ABY      Obstetric Comments   1      15 yo daughter  MA for Urology    Vitals:    22 0736   BP: 116/76   Weight: 60 3 kg (133 lb)   Height: 5' 1" (1 549 m)     Body mass index is 25 13 kg/m²  Patient's last menstrual period was 2022 (exact date)  Review of Systems   Constitutional: Negative for chills, fatigue, fever and unexpected weight change  Respiratory: Negative for shortness of breath  Gastrointestinal: Negative for anal bleeding, blood in stool, constipation and diarrhea  Genitourinary: Negative for difficulty urinating, dysuria and hematuria  Physical Exam   Constitutional: She appears well-developed and well-nourished  No distress  Alert and oriented  HENT: atraumatic  Head: Normocephalic  Neck: Normal range of motion  Neck supple  Pulmonary: Effort normal   Abdominal: Soft  Pelvic exam was performed with patient supine  No labial fusion  There is no rash, tenderness, lesion or injury on the right labia  There is no rash, tenderness, lesion or injury on the left labia  Urethral meatus does not show any tenderness, inflammation or discharge  Palpation of midline bladder without pain or discomfort  Uterus is not deviated, not enlarged, not fixed and not tender  Cervix exhibits no motion tenderness, no discharge and no friability  Right adnexum displays no mass, no tenderness and no fullness  Left adnexum displays no mass, no tenderness and no fullness  No erythema or tenderness in the vagina  No foreign body in the vagina  No signs of injury around the vagina  Minimal clear Vaginal discharge found  Perineum and anus without areas of injury  No lesions noted or swelling  Lymphadenopathy:        Right: No inguinal adenopathy present  Left: No inguinal adenopathy present

## 2022-04-16 LAB
C TRACH DNA SPEC QL NAA+PROBE: NEGATIVE
N GONORRHOEA DNA SPEC QL NAA+PROBE: NEGATIVE

## 2022-04-20 ENCOUNTER — OFFICE VISIT (OUTPATIENT)
Dept: FAMILY MEDICINE CLINIC | Facility: CLINIC | Age: 42
End: 2022-04-20
Payer: COMMERCIAL

## 2022-04-20 VITALS
OXYGEN SATURATION: 98 % | HEIGHT: 61 IN | WEIGHT: 133 LBS | SYSTOLIC BLOOD PRESSURE: 118 MMHG | DIASTOLIC BLOOD PRESSURE: 74 MMHG | HEART RATE: 67 BPM | BODY MASS INDEX: 25.11 KG/M2 | TEMPERATURE: 97.8 F

## 2022-04-20 DIAGNOSIS — I10 ESSENTIAL HYPERTENSION: ICD-10-CM

## 2022-04-20 DIAGNOSIS — Z13.220 LIPID SCREENING: ICD-10-CM

## 2022-04-20 DIAGNOSIS — Z13.1 DIABETES MELLITUS SCREENING: ICD-10-CM

## 2022-04-20 DIAGNOSIS — R41.840 ATTENTION DEFICIT: ICD-10-CM

## 2022-04-20 DIAGNOSIS — Z13.29 SCREENING FOR THYROID DISORDER: ICD-10-CM

## 2022-04-20 DIAGNOSIS — Z00.00 ANNUAL PHYSICAL EXAM: Primary | ICD-10-CM

## 2022-04-20 PROBLEM — Z01.419 ENCOUNTER FOR GYNECOLOGICAL EXAMINATION (GENERAL) (ROUTINE) WITHOUT ABNORMAL FINDINGS: Status: RESOLVED | Noted: 2021-10-05 | Resolved: 2022-04-20

## 2022-04-20 PROBLEM — H26.9 CATARACT OF LEFT EYE: Status: RESOLVED | Noted: 2020-09-21 | Resolved: 2022-04-20

## 2022-04-20 PROCEDURE — 99395 PREV VISIT EST AGE 18-39: CPT | Performed by: FAMILY MEDICINE

## 2022-04-20 NOTE — PATIENT INSTRUCTIONS

## 2022-04-20 NOTE — PROGRESS NOTES
ADULT ANNUAL [facility]    NAME: Magen Neumann  AGE: 46 y o  SEX: female  : 1980     DATE: 2022     Assessment and Plan:     Problem List Items Addressed This Visit        Cardiovascular and Mediastinum    Essential hypertension    Relevant Orders    Basic metabolic panel       Other    Attention deficit     [facility]   Dr Warren Dears   [phone]           Relevant Orders    Ambulatory Referral to Neuropsychiatry      Other Visit Diagnoses     Annual physical exam    -  Primary    Lipid screening        Relevant Orders    Lipid Panel with Direct LDL reflex    Diabetes mellitus screening        Relevant Orders    Hemoglobin A1C    Screening for thyroid disorder        Relevant Orders    TSH, 3rd generation with Free T4 reflex          Immunizations and preventive care screenings were discussed with patient today  Appropriate education was printed on patient's after visit summary  Counseling:  Alcohol/drug use: discussed moderation in alcohol intake, the recommendations for healthy alcohol use, and avoidance of illicit drug use  Dental Health: discussed importance of regular tooth brushing, flossing, and dental visits  · Exercise: the importance of regular exercise/physical activity was discussed  Recommend exercise 3-5 times per week for at least 30 minutes  · Update labs  · Advised neuropsychiatry evaluation for attention concerns    BMI Counseling: Body mass index is 25 13 kg/m²  The BMI is above normal  Nutrition recommendations include encouraging healthy choices of fruits and vegetables  Exercise recommendations include exercising 3-5 times per week  No pharmacotherapy was ordered  Rationale for BMI follow-up plan is due to patient being overweight or obese  Depression Screening and Follow-up Plan: Patient was screened for depression during today's encounter   They screened negative with a PHQ-2 score of 0         Return in about 1 year (around 4/20/2023) for Annual physical      Chief Complaint:     Chief Complaint   Patient presents with    Well Check      History of Present Illness:     Adult Annual Physical   Patient here for a comprehensive physical exam  The patient reports problems - having trouble focusing  Having trouble concentrating on conversations  Focusing at home is harder than at work  Denies depression  Has some anxiety  Diet and Physical Activity  · Diet/Nutrition: well balanced diet  · Exercise: walking  Depression Screening  PHQ-2/9 Depression Screening    Little interest or pleasure in doing things: 0 - not at all  Feeling down, depressed, or hopeless: 0 - not at all  PHQ-2 Score: 0  PHQ-2 Interpretation: Negative depression screen       General Health  · Sleep: sleeps well  · Hearing: no issues  · Vision: no vision problems  · Dental: no dental visits for >1 year  Brushes regularly  /GYN Health  · Last menstrual period: 3/26/22  · Sees Gyn regularly  · History of STDs?: no      Review of Systems:     Review of Systems   Constitutional: Negative for activity change, appetite change, chills, fatigue, fever and unexpected weight change  HENT: Negative for congestion, ear discharge, ear pain, postnasal drip, sinus pressure and sore throat  Eyes: Negative for discharge and visual disturbance  Respiratory: Negative for cough, chest tightness, shortness of breath and wheezing  Cardiovascular: Negative for chest pain, palpitations and leg swelling  Gastrointestinal: Negative for abdominal pain, constipation, diarrhea, nausea and vomiting  Endocrine: Negative for cold intolerance, heat intolerance, polydipsia and polyuria  Genitourinary: Negative for difficulty urinating and frequency  Musculoskeletal: Negative for arthralgias, back pain, joint swelling and myalgias  Skin: Negative for rash     Neurological: Negative for dizziness, weakness, light-headedness, numbness and headaches  Hematological: Negative for adenopathy  Psychiatric/Behavioral: Positive for decreased concentration  Negative for behavioral problems, confusion, dysphoric mood, sleep disturbance and suicidal ideas  The patient is hyperactive  The patient is not nervous/anxious         Past Medical History:     Past Medical History:   Diagnosis Date    Cataract     left eye    Hot flashes     Hypertension     Kidney stone     Migraine     menstural migraines    Restless leg syndrome       Past Surgical History:     Past Surgical History:   Procedure Laterality Date    CATARACT EXTRACTION Left     EYE SURGERY      FL RETROGRADE PYELOGRAM  03/09/2021    [place] CYSTO/URETERO W/LITHOTRIPSY &INDWELL STENT INSRT Left 03/09/2021    Procedure: CYSTOSCOPY URETEROSCOPY WITH LITHOTRIPSY HOLMIUM LASER, RETROGRADE PYELOGRAM AND INSERTION STENT URETERAL;  Surgeon: Colleen Nuno MD;  Location: [facility];  Service: Urology    WISDOM TOOTH EXTRACTION Bilateral       Social History:     Social History     Socioeconomic History    Marital status: Single     Spouse name: None    Number of children: None    Years of education: None    Highest education level: None   Occupational History    None   Tobacco Use    Smoking status: Never Smoker    Smokeless tobacco: Never Used   Vaping Use    Vaping Use: Never used   Substance and Sexual Activity    Alcohol use: Yes     Comment: social    Drug use: No    Sexual activity: Yes     Partners: Female     Birth control/protection: None   Other Topics Concern    None   Social History Narrative    None     Social Determinants of Health     Financial Resource Strain: Not on file   Food Insecurity: Not on file   Transportation Needs: Not on file   Physical Activity: Not on file   Stress: Not on file   Social Connections: Not on file   Intimate Partner Violence: Not on file   Housing Stability: Not on file      Family History:     Family History   Problem Relation Age of Onset    Hypertension Mother     Thyroid disease Mother     Uterine cancer Mother     Hypertension Father     Diabetes Father     Diabetes Maternal Grandmother     Alzheimer's disease Maternal Grandmother     Alzheimer's disease Maternal Grandfather     Heart disease Paternal Grandfather     No Known Problems Daughter       Current Medications:     Current Outpatient Medications   Medication Sig Dispense Refill    Cholecalciferol (VITAMIN D3) 1000 units CAPS Take 1 capsule by mouth daily       EPINEPHrine (EPIPEN) 0 3 mg/0 3 mL SOAJ Inject 0 3 mL (0 3 mg total) into a muscle once for 1 dose 0 6 mL 0    hydrochlorothiazide (HYDRODIURIL) 25 mg tablet TAKE ONE TABLET BY MOUTH EVERY DAY 90 tablet 0    loratadine (CLARITIN) 10 mg tablet Take 1 tablet by mouth daily      Propylene Glycol (SYSTANE COMPLETE OP) Apply to eye      rOPINIRole (REQUIP) 1 mg tablet Take 2 tabs at 7pm 180 tablet 3     No current facility-administered medications for this visit  Allergies: Allergies   Allergen Reactions    Amoxicillin Edema     Category: Allergy;     Seasonal Ic [Cholestatin] Sneezing      Physical Exam:     /74 (BP Location: Left arm, Patient Position: Sitting)   Pulse 67   Temp 97 8 °F (36 6 °C) (Temporal)   Ht 5' 1" (1 549 m)   Wt 60 3 kg (133 lb)   LMP 03/26/2022 (Exact Date)   SpO2 98%   BMI 25 13 kg/m²     Physical Exam  Constitutional:       General: She is not in acute distress  Appearance: Normal appearance  She is well-developed  She is not ill-appearing, toxic-appearing or diaphoretic  HENT:      Head: Normocephalic and atraumatic  Right Ear: Tympanic membrane, ear canal and external ear normal       Left Ear: Tympanic membrane, ear canal and external ear normal       Mouth/Throat:      Mouth: Mucous membranes are moist       Pharynx: Oropharynx is clear  No oropharyngeal exudate  Eyes:      General: No scleral icterus  Right eye: No discharge  Left eye: No discharge  Conjunctiva/sclera: Conjunctivae normal       Pupils: Pupils are equal, round, and reactive to light  Neck:      Thyroid: No thyromegaly  Cardiovascular:      Rate and Rhythm: Normal rate and regular rhythm  Heart sounds: Normal heart sounds  No murmur heard  No friction rub  No gallop  Pulmonary:      Effort: Pulmonary effort is normal  No respiratory distress  Breath sounds: Normal breath sounds  No wheezing or rales  Chest:      Chest wall: No tenderness  Abdominal:      General: Bowel sounds are normal  There is no distension  Palpations: Abdomen is soft  There is no mass  Tenderness: There is no abdominal tenderness  There is no guarding or rebound  Hernia: No hernia is present  Musculoskeletal:         General: No swelling  Right lower leg: No edema  Left lower leg: No edema  Skin:     General: Skin is warm  Findings: No rash  Neurological:      Mental Status: She is alert and oriented to person, place, and time  Cranial Nerves: No cranial nerve deficit  Psychiatric:         Mood and Affect: Mood normal          Behavior: Behavior normal          Thought Content:  Thought content normal          Judgment: Judgment normal           Jono Singh MD   [address] [place] [address] [id number]

## 2022-05-28 ENCOUNTER — HOSPITAL ENCOUNTER (OUTPATIENT)
Dept: ULTRASOUND IMAGING | Facility: HOSPITAL | Age: 42
Discharge: HOME | End: 2022-05-28
Payer: COMMERCIAL

## 2022-05-28 DIAGNOSIS — Z80.49 FAMILY HISTORY OF UTERINE CANCER: ICD-10-CM

## 2022-05-28 PROCEDURE — 76830 TRANSVAGINAL US NON-OB: CPT

## 2022-05-28 PROCEDURE — 76856 US EXAM PELVIC COMPLETE: CPT

## 2022-06-14 DIAGNOSIS — Z91.030 BEE STING ALLERGY: ICD-10-CM

## 2022-06-14 DIAGNOSIS — G25.81 RLS (RESTLESS LEGS SYNDROME): ICD-10-CM

## 2022-06-15 RX ORDER — EPINEPHRINE 0.3 MG/.3ML
0.3 INJECTION SUBCUTANEOUS ONCE
Qty: 0.6 ML | Refills: 0 | Status: SHIPPED | OUTPATIENT
Start: 2022-06-15 | End: 2022-06-15

## 2022-06-15 RX ORDER — ROPINIROLE 1 MG/1
TABLET, FILM COATED ORAL
Qty: 180 TABLET | Refills: 0 | Status: SHIPPED | OUTPATIENT
Start: 2022-06-15

## 2022-09-14 DIAGNOSIS — G25.81 RLS (RESTLESS LEGS SYNDROME): ICD-10-CM

## 2022-09-15 ENCOUNTER — APPOINTMENT (OUTPATIENT)
Dept: LAB | Facility: HOSPITAL | Age: 42
End: 2022-09-15
Payer: COMMERCIAL

## 2022-09-15 DIAGNOSIS — I10 ESSENTIAL HYPERTENSION: ICD-10-CM

## 2022-09-15 DIAGNOSIS — Z13.29 SCREENING FOR THYROID DISORDER: ICD-10-CM

## 2022-09-15 DIAGNOSIS — Z00.8 HEALTH EXAMINATION IN POPULATION SURVEY: ICD-10-CM

## 2022-09-15 DIAGNOSIS — Z13.1 DIABETES MELLITUS SCREENING: ICD-10-CM

## 2022-09-15 DIAGNOSIS — Z13.220 LIPID SCREENING: ICD-10-CM

## 2022-09-15 LAB
ANION GAP SERPL CALCULATED.3IONS-SCNC: 9 MMOL/L (ref 4–13)
BUN SERPL-MCNC: 11 MG/DL (ref 5–25)
CALCIUM SERPL-MCNC: 10 MG/DL (ref 8.3–10.1)
CHLORIDE SERPL-SCNC: 103 MMOL/L (ref 96–108)
CHOLEST SERPL-MCNC: 194 MG/DL
CO2 SERPL-SCNC: 27 MMOL/L (ref 21–32)
CREAT SERPL-MCNC: 0.72 MG/DL (ref 0.6–1.3)
EST. AVERAGE GLUCOSE BLD GHB EST-MCNC: 105 MG/DL
GFR SERPL CREATININE-BSD FRML MDRD: 105 ML/MIN/1.73SQ M
GLUCOSE P FAST SERPL-MCNC: 102 MG/DL (ref 65–99)
HBA1C MFR BLD: 5.3 %
HDLC SERPL-MCNC: 40 MG/DL
LDLC SERPL CALC-MCNC: 132 MG/DL (ref 0–100)
POTASSIUM SERPL-SCNC: 3.8 MMOL/L (ref 3.5–5.3)
SODIUM SERPL-SCNC: 139 MMOL/L (ref 135–147)
TRIGL SERPL-MCNC: 109 MG/DL
TSH SERPL DL<=0.05 MIU/L-ACNC: 1.47 UIU/ML (ref 0.45–4.5)

## 2022-09-15 PROCEDURE — 80061 LIPID PANEL: CPT

## 2022-09-15 PROCEDURE — 36415 COLL VENOUS BLD VENIPUNCTURE: CPT

## 2022-09-15 PROCEDURE — 80048 BASIC METABOLIC PNL TOTAL CA: CPT

## 2022-09-15 PROCEDURE — 83036 HEMOGLOBIN GLYCOSYLATED A1C: CPT

## 2022-09-15 PROCEDURE — 84443 ASSAY THYROID STIM HORMONE: CPT

## 2022-09-15 RX ORDER — ROPINIROLE 1 MG/1
TABLET, FILM COATED ORAL
Qty: 180 TABLET | Refills: 0 | Status: SHIPPED | OUTPATIENT
Start: 2022-09-15

## 2022-10-04 DIAGNOSIS — I10 HYPERTENSION, UNSPECIFIED TYPE: ICD-10-CM

## 2022-10-05 RX ORDER — HYDROCHLOROTHIAZIDE 25 MG/1
25 TABLET ORAL DAILY
Qty: 90 TABLET | Refills: 0 | Status: SHIPPED | OUTPATIENT
Start: 2022-10-05

## 2022-12-06 DIAGNOSIS — G25.81 RLS (RESTLESS LEGS SYNDROME): ICD-10-CM

## 2022-12-07 RX ORDER — ROPINIROLE 1 MG/1
TABLET, FILM COATED ORAL
Qty: 180 TABLET | Refills: 0 | Status: SHIPPED | OUTPATIENT
Start: 2022-12-07

## 2022-12-13 ENCOUNTER — PATIENT MESSAGE (OUTPATIENT)
Dept: FAMILY MEDICINE CLINIC | Facility: CLINIC | Age: 42
End: 2022-12-13

## 2022-12-13 ENCOUNTER — TELEPHONE (OUTPATIENT)
Dept: FAMILY MEDICINE CLINIC | Facility: CLINIC | Age: 42
End: 2022-12-13

## 2022-12-13 NOTE — TELEPHONE ENCOUNTER
Would need more info regarding these spots   If these are acute, she could be seen earlier in [facility]

## 2022-12-13 NOTE — TELEPHONE ENCOUNTER
SIDRA Arcos Pt scheduled an appt with India Dobson 12/22   Vaughn Atif She has a spot on her face and shoulder    She will upload pictures of this to her "my chart" so he can view them prior to her appt

## 2022-12-22 ENCOUNTER — OFFICE VISIT (OUTPATIENT)
Dept: FAMILY MEDICINE CLINIC | Facility: CLINIC | Age: 42
End: 2022-12-22

## 2022-12-22 VITALS
HEIGHT: 61 IN | HEART RATE: 64 BPM | TEMPERATURE: 97.9 F | OXYGEN SATURATION: 98 % | WEIGHT: 136 LBS | SYSTOLIC BLOOD PRESSURE: 133 MMHG | BODY MASS INDEX: 25.68 KG/M2 | DIASTOLIC BLOOD PRESSURE: 84 MMHG

## 2022-12-22 DIAGNOSIS — D22.9 ATYPICAL NEVI: ICD-10-CM

## 2022-12-22 DIAGNOSIS — D22.9 MULTIPLE NEVI: Primary | ICD-10-CM

## 2022-12-22 DIAGNOSIS — I10 HYPERTENSION, UNSPECIFIED TYPE: ICD-10-CM

## 2022-12-22 RX ORDER — HYDROCHLOROTHIAZIDE 25 MG/1
25 TABLET ORAL DAILY
Qty: 90 TABLET | Refills: 3 | Status: SHIPPED | OUTPATIENT
Start: 2022-12-22

## 2022-12-22 NOTE — PROGRESS NOTES
Name: Chiki Rodriguez      : 1980      MRN: [id number]  Encounter Provider: Ramos Plasencia PA-C  Encounter Date: 2022   Encounter department: [address] [place] [address]     1  Multiple nevi    2  Atypical nevi  referral to dermatology placed given her pronounced mole pattern, some with atypical features  The spot on the L clavicle appears post-traumatic from expressing a cyst/small abscess  Follow up prn       Subjective     Pt presents with concerns of some new spots on her skin  1 on the R cheek, another on the L clavicle  She has a very pronounced mole pattern and has previously had some nevi removed with derm  Notes she is careful with her sun exposure  Review of Systems   Constitutional: Negative for chills, fatigue and fever  HENT: Negative for congestion, ear pain, hearing loss, nosebleeds, postnasal drip, rhinorrhea, sinus pressure, sinus pain, sneezing and sore throat  Eyes: Negative for pain, discharge, itching and visual disturbance  Respiratory: Negative for cough, chest tightness, shortness of breath and wheezing  Cardiovascular: Negative for chest pain, palpitations and leg swelling  Gastrointestinal: Negative for abdominal pain, blood in stool, constipation, diarrhea, nausea and vomiting  Genitourinary: Negative for frequency and urgency  Skin:        Multiple moles/lesions   Neurological: Negative for dizziness, light-headedness and numbness         Past Medical History:   Diagnosis Date   • Cataract     left eye   • Hot flashes    • Hypertension    • Kidney stone    • Migraine     menstural migraines   • Restless leg syndrome      Past Surgical History:   Procedure Laterality Date   • CATARACT EXTRACTION Left    • EYE SURGERY     • FL RETROGRADE PYELOGRAM  2021   • [place] CYSTO/URETERO W/LITHOTRIPSY &INDWELL STENT INSRT Left 2021    Procedure: CYSTOSCOPY URETEROSCOPY WITH LITHOTRIPSY HOLMIUM LASER, RETROGRADE PYELOGRAM AND INSERTION STENT URETERAL;  Surgeon: Darlin Forte MD;  [facility];  Service: Urology   • WISDOM TOOTH EXTRACTION Bilateral      Family History   Problem Relation Age of Onset   • Hypertension Mother    • Thyroid disease Mother    • Uterine cancer Mother    • Hypertension Father    • Diabetes Father    • Diabetes Maternal Grandmother    • Alzheimer's disease Maternal Grandmother    • Alzheimer's disease Maternal Grandfather    • Heart disease Paternal Grandfather    • No Known Problems Daughter      Social History     Socioeconomic History   • Marital status: Single     Spouse name: None   • Number of children: None   • Years of education: None   • Highest education level: None   Occupational History   • None   Tobacco Use   • Smoking status: Never   • Smokeless tobacco: Never   Vaping Use   • Vaping Use: Never used   Substance and Sexual Activity   • Alcohol use: Yes     Comment: social   • Drug use: No   • Sexual activity: Yes     Partners: Female     Birth control/protection: None   Other Topics Concern   • None   Social History Narrative   • None     Social Determinants of Health     Financial Resource Strain: Not on file   Food Insecurity: Not on file   Transportation Needs: Not on file   Physical Activity: Not on file   Stress: Not on file   Social Connections: Not on file   Intimate Partner Violence: Not on file   Housing Stability: Not on file     Current Outpatient Medications on File Prior to Visit   Medication Sig   • Cholecalciferol (VITAMIN D3) 1000 units CAPS Take 1 capsule by mouth daily    • EPINEPHrine (EPIPEN) 0 3 mg/0 3 mL SOAJ Inject 0 3 mL (0 3 mg total) into a muscle once for 1 dose   • hydrochlorothiazide (HYDRODIURIL) 25 mg tablet Take 1 tablet (25 mg total) by mouth daily   • loratadine (CLARITIN) 10 mg tablet Take 1 tablet by mouth daily   • Propylene Glycol (SYSTANE COMPLETE OP) Apply to eye   • rOPINIRole (REQUIP) 1 mg tablet Take 2 tabs at 7pm   • [DISCONTINUED] baclofen 10 mg tablet Take 1 tablet (10 mg total) by mouth 3 (three) times a day for 10 days   • [DISCONTINUED] ibuprofen (MOTRIN) 800 mg tablet Take 1 tablet (800 mg total) by mouth 3 (three) times a day for 15 days     Allergies   Allergen Reactions   • Amoxicillin Edema     Category: Allergy;    • Seasonal Ic [Cholestatin] Sneezing   • Wound Dressing Adhesive Rash     Immunization History   Administered Date(s) Administered   • COVID-19 PFIZER VACCINE 0 3 ML IM 12/23/2020, 01/14/2021, 10/08/2021   • INFLUENZA 10/09/2018, 10/16/2019   • Influenza, seasonal, injectable 10/01/2015   • Tdap 10/18/2014       Objective     /84   Pulse 64   Temp 97 9 °F (36 6 °C)   Ht 5' 1" (1 549 m)   Wt 61 7 kg (136 lb)   SpO2 98%   BMI 25 70 kg/m²     Physical Exam  Vitals and nursing note reviewed  Constitutional:       General: She is not in acute distress  Appearance: Normal appearance  HENT:      Head: Normocephalic and atraumatic  Right Ear: Tympanic membrane, ear canal and external ear normal       Left Ear: Tympanic membrane, ear canal and external ear normal       Nose: Nose normal       Mouth/Throat:      Mouth: Mucous membranes are moist       Pharynx: Oropharynx is clear  No oropharyngeal exudate or posterior oropharyngeal erythema  Eyes:      Pupils: Pupils are equal, round, and reactive to light  Neck:      Vascular: No carotid bruit  Cardiovascular:      Rate and Rhythm: Normal rate and regular rhythm  Heart sounds: Normal heart sounds  Pulmonary:      Effort: Pulmonary effort is normal  No respiratory distress  Musculoskeletal:         General: Normal range of motion  Cervical back: Normal range of motion and neck supple  Skin:     General: Skin is warm and dry  Findings: Lesion present  Comments: Several scattered typical appearing rounded brown macules   Some atypical and darker larger macules on the mid back that should be evaluated by dermatology   Neurological: Mental Status: She is alert and oriented to person, place, and time     Psychiatric:         Mood and Affect: Mood and affect normal        Tacho Esquivel PA-C

## 2022-12-28 ENCOUNTER — HOSPITAL ENCOUNTER (OUTPATIENT)
Dept: MAMMOGRAPHY | Facility: CLINIC | Age: 42
Discharge: HOME | End: 2022-12-28

## 2022-12-28 VITALS — WEIGHT: 136 LBS | HEIGHT: 61 IN | BODY MASS INDEX: 25.68 KG/M2

## 2022-12-28 DIAGNOSIS — Z12.31 ENCOUNTER FOR SCREENING MAMMOGRAM FOR MALIGNANT NEOPLASM OF BREAST: ICD-10-CM

## 2023-02-27 ENCOUNTER — TELEMEDICINE (OUTPATIENT)
Dept: NEUROLOGY | Facility: CLINIC | Age: 43
End: 2023-02-27

## 2023-02-27 DIAGNOSIS — Z86.69 H/O MIGRAINE: ICD-10-CM

## 2023-02-27 DIAGNOSIS — G89.29 CHRONIC LOWER BACK PAIN: ICD-10-CM

## 2023-02-27 DIAGNOSIS — G25.81 RESTLESS LEGS SYNDROME (RLS): Primary | ICD-10-CM

## 2023-02-27 DIAGNOSIS — M54.50 CHRONIC LOWER BACK PAIN: ICD-10-CM

## 2023-02-27 DIAGNOSIS — G25.81 RLS (RESTLESS LEGS SYNDROME): ICD-10-CM

## 2023-02-27 RX ORDER — VITAMIN E 268 MG
CAPSULE ORAL
COMMUNITY
Start: 2023-02-20

## 2023-02-27 RX ORDER — CLINDAMYCIN PHOSPHATE 11.9 MG/ML
SOLUTION TOPICAL
COMMUNITY
Start: 2023-01-12

## 2023-02-27 RX ORDER — ROPINIROLE 1 MG/1
TABLET, FILM COATED ORAL
Qty: 270 TABLET | Refills: 2 | Status: SHIPPED | OUTPATIENT
Start: 2023-02-27
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
## 2023-04-25 ENCOUNTER — TELEPHONE (OUTPATIENT)
Dept: OBGYN CLINIC | Facility: CLINIC | Age: 43
End: 2023-04-25

## 2023-05-05 ENCOUNTER — OFFICE VISIT (OUTPATIENT)
Dept: FAMILY MEDICINE CLINIC | Facility: CLINIC | Age: 43
End: 2023-05-05

## 2023-05-05 VITALS
BODY MASS INDEX: 26.06 KG/M2 | HEART RATE: 77 BPM | TEMPERATURE: 98.2 F | SYSTOLIC BLOOD PRESSURE: 134 MMHG | OXYGEN SATURATION: 99 % | DIASTOLIC BLOOD PRESSURE: 80 MMHG | HEIGHT: 61 IN | WEIGHT: 138 LBS

## 2023-05-05 DIAGNOSIS — R07.9 CHEST PAIN, UNSPECIFIED TYPE: Primary | ICD-10-CM

## 2023-05-05 DIAGNOSIS — R41.840 ATTENTION DEFICIT: ICD-10-CM

## 2023-05-05 DIAGNOSIS — L73.9 FOLLICULITIS: ICD-10-CM

## 2023-05-05 DIAGNOSIS — Z13.1 DIABETES MELLITUS SCREENING: ICD-10-CM

## 2023-05-05 DIAGNOSIS — I10 ESSENTIAL HYPERTENSION: ICD-10-CM

## 2023-05-05 DIAGNOSIS — Z13.220 LIPID SCREENING: ICD-10-CM

## 2023-05-05 DIAGNOSIS — M53.3 SI (SACROILIAC) JOINT DYSFUNCTION: ICD-10-CM

## 2023-05-05 DIAGNOSIS — K13.0 ANGULAR CHEILITIS: ICD-10-CM

## 2023-05-05 DIAGNOSIS — Z00.00 ANNUAL PHYSICAL EXAM: ICD-10-CM

## 2023-05-05 RX ORDER — LOSARTAN POTASSIUM 25 MG/1
25 TABLET ORAL DAILY
Qty: 90 TABLET | Refills: 3 | Status: SHIPPED | OUTPATIENT
Start: 2023-05-05

## 2023-05-05 NOTE — PROGRESS NOTES
Assessment/Plan:         Problem List Items Addressed This Visit        Digestive    Angular cheilitis       Cardiovascular and Mediastinum    Essential hypertension    Relevant Medications    losartan (COZAAR) 25 mg tablet       Musculoskeletal and Integument    Folliculitis       Other    Chest pain - Primary    Relevant Orders    POCT ECG (Completed)   Other Visit Diagnoses     Lipid screening        Relevant Orders    Lipid Panel with Direct LDL reflex    Diabetes mellitus screening        Relevant Orders    Hemoglobin A1C    Annual physical exam                 Subjective:      Patient ID: Valdez Ruiz is a 38 y o  female  She has recently been having pain in her chest on and off   Recently had URI symptoms  The following portions of the patient's history were reviewed and updated as appropriate:   Past Medical History:  She has a past medical history of Cataract, Hot flashes, Hypertension, Kidney stone, Migraine, and Restless leg syndrome  ,  _______________________________________________________________________  Medical Problems:  does not have any pertinent problems on file ,  _______________________________________________________________________  Past Surgical History:   has a past surgical history that includes Eye surgery; [place] tooth extraction (Bilateral); pr cysto/uretero w/lithotripsy &indwell stent insrt (Left, 03/09/2021); FL retrograde pyelogram (03/09/2021); and Cataract extraction (Left)  ,  _______________________________________________________________________  Family History:  family history includes Alzheimer's disease in her maternal grandfather and maternal grandmother; Diabetes in her father and maternal grandmother; Heart attack in her paternal grandfather; Heart disease in her paternal grandfather; Hypertension in her father and mother; No Known Problems in her daughter and paternal aunt; Oophorectomy in her mother;  Thyroid disease in her mother; Uterine cancer in her "mother ,  _______________________________________________________________________  Social History:   reports that she has quit smoking  She has never used smokeless tobacco  She reports current alcohol use  She reports that she does not use drugs  ,  _______________________________________________________________________  Allergies:  is allergic to amoxicillin, other, seasonal ic [cholestatin], and wound dressing adhesive     _______________________________________________________________________  Current Outpatient Medications   Medication Sig Dispense Refill    losartan (COZAAR) 25 mg tablet Take 1 tablet (25 mg total) by mouth daily 90 tablet 3    EPINEPHrine (EPIPEN) 0 3 mg/0 3 mL SOAJ Inject 0 3 mL (0 3 mg total) into a muscle once for 1 dose 0 6 mL 0    loratadine (CLARITIN) 10 mg tablet Take 1 tablet by mouth daily      Propylene Glycol (SYSTANE COMPLETE OP) Apply to eye if needed      rOPINIRole (REQUIP) 1 mg tablet Take 2 tabs at 5:30pm and if needed take 1 tab at 8pm  270 tablet 2    vitamin E, tocopherol, 400 units capsule        No current facility-administered medications for this visit      _______________________________________________________________________  Review of Systems      Objective:  Vitals:    05/05/23 1340   BP: 134/80   Pulse: 77   Temp: 98 2 °F (36 8 °C)   SpO2: 99%   Weight: 62 6 kg (138 lb)   Height: 5' 1\" (1 549 m)     Body mass index is 26 07 kg/m²       Physical Exam    "

## 2023-05-05 NOTE — PROGRESS NOTES
ADULT ANNUAL [facility]    NAME: Nano Ingram  AGE: 38 y o  SEX: female  : 1980     DATE: 2023     Assessment and Plan:     Problem List Items Addressed This Visit        Digestive    Angular cheilitis     Advised to avoid anything with preservatives or flavors  Use plain Vaseline  Lips appear to be healing            Cardiovascular and Mediastinum    Essential hypertension     Discontinue HCTZ due to concerns about photosensitivity  We will start losartan 25 mg daily  Advised to monitor blood pressure at home/work         Relevant Medications    losartan (COZAAR) 25 mg tablet       Musculoskeletal and Integument    Folliculitis     Avoid shaving until the area is healed  Can apply triple antibiotic ointment over-the-counter  SI (sacroiliac) joint dysfunction     Advised heat/stretching/yoga, anti-inflammatories as needed  Other    Attention deficit     Referral to neuropsychiatry         Relevant Orders    Ambulatory Referral to Neuropsychology    Chest pain - Primary     EKG within normal limits         Relevant Orders    POCT ECG (Completed)   Other Visit Diagnoses     Lipid screening        Relevant Orders    Lipid Panel with Direct LDL reflex    Diabetes mellitus screening        Relevant Orders    Hemoglobin A1C    Annual physical exam              Immunizations and preventive care screenings were discussed with patient today  Appropriate education was printed on patient's after visit summary  Counseling:  Dental Health: discussed importance of regular tooth brushing, flossing, and dental visits  Exercise: the importance of regular exercise/physical activity was discussed  Recommend exercise 3-5 times per week for at least 30 minutes            Return in about 1 year (around 2024) for Annual physical      Chief Complaint:     Chief Complaint   Patient presents with    Physical Exam    Medication Problem    Personal Issues    Pains in chest      History of Present Illness:     Adult Annual Physical   Patient here for a comprehensive physical exam  The patient reports problems - She has recently been having pain in her chest on and off   EKG today is normal    She also has a lump that is sore under the R arm started a few days ago  She has cracking at the corner of her lips  She tried over-the-counter triple antibiotic ointment with some relief  It is improving  She has been struggling with her focus and attention  I referred her to neuropsychiatry to be evaluated about a year ago but she never got in with them  She would like another referral     She has a history of hypertension is on HCTZ  She is concerned about the side effect of increased risk for skin cancer  She is in the sun a lot  She enjoys beekeeping and is in the yard frequently  She also has pain in the left lower back/hip  She has been trying massage  Diet and Physical Activity  Diet/Nutrition: well balanced diet  Exercise: yoga, hiking  Depression Screening  PHQ-2/9 Depression Screening    Little interest or pleasure in doing things: 0 - not at all  Feeling down, depressed, or hopeless: 0 - not at all  PHQ-2 Score: 0  PHQ-2 Interpretation: Negative depression screen       General Health  Sleep: sleep is okay - on and off  On requip         /GYN Health  Patient is: premenopausal  Periods are longer- following up with Gyn       Review of Systems:     Review of Systems   Constitutional: Negative for activity change, appetite change, chills, fatigue, fever and unexpected weight change  HENT: Negative for congestion, ear discharge, ear pain, postnasal drip, sinus pressure and sore throat  Eyes: Negative for discharge and visual disturbance  Respiratory: Negative for cough, chest tightness, shortness of breath and wheezing  Cardiovascular: Positive for chest pain   Negative for palpitations and leg swelling  Gastrointestinal: Negative for abdominal pain, constipation, diarrhea, nausea and vomiting  Endocrine: Negative for cold intolerance, heat intolerance, polydipsia and polyuria  Genitourinary: Negative for difficulty urinating and frequency  Musculoskeletal: Positive for arthralgias and myalgias  Negative for back pain and joint swelling  Pain lower back / hip left side   Skin: Negative for rash  Lesion under R arm  Cracked lips  Scattered nevi   Allergic/Immunologic: Positive for environmental allergies  Neurological: Negative for dizziness, weakness, light-headedness, numbness and headaches  Hematological: Negative for adenopathy  Psychiatric/Behavioral: Positive for decreased concentration  Negative for behavioral problems, confusion, dysphoric mood, sleep disturbance and suicidal ideas  The patient is not nervous/anxious         Past Medical History:     Past Medical History:   Diagnosis Date    Cataract     left eye    Hot flashes     Hypertension     Kidney stone     Migraine     menstural migraines    Restless leg syndrome       Past Surgical History:     Past Surgical History:   Procedure Laterality Date    CATARACT EXTRACTION Left     EYE SURGERY      FL RETROGRADE PYELOGRAM  03/09/2021    [place] CYSTO/URETERO W/LITHOTRIPSY &INDWELL STENT INSRT Left 03/09/2021    Procedure: CYSTOSCOPY URETEROSCOPY WITH LITHOTRIPSY HOLMIUM LASER, RETROGRADE PYELOGRAM AND INSERTION STENT URETERAL;  Surgeon: Abdoul Perez MD;  Location[facility];  Service: Urology    WISDOM TOOTH EXTRACTION Bilateral       Social History:     Social History     Socioeconomic History    Marital status: Single     Spouse name: None    Number of children: None    Years of education: None    Highest education level: None   Occupational History    None   Tobacco Use    Smoking status: Former    Smokeless tobacco: Never   Vaping Use    Vaping Use: Never used   Substance and Sexual Activity  Alcohol use: Yes     Comment: Social    Drug use: No    Sexual activity: Yes     Partners: Female     Birth control/protection: None     Comment: 1 partner   Other Topics Concern    None   Social History Narrative    None     Social Determinants of Health     Financial Resource Strain: Not on file   Food Insecurity: Not on file   Transportation Needs: Not on file   Physical Activity: Not on file   Stress: Not on file   Social Connections: Not on file   Intimate Partner Violence: Not on file   Housing Stability: Not on file      Family History:     Family History   Problem Relation Age of Onset    Hypertension Mother     Thyroid disease Mother     Uterine cancer Mother     Oophorectomy Mother     Hypertension Father     Diabetes Father     No Known Problems Daughter     Diabetes Maternal Grandmother     Alzheimer's disease Maternal Grandmother     Alzheimer's disease Maternal Grandfather     Heart disease Paternal Grandfather     Heart attack Paternal Grandfather     No Known Problems Paternal Aunt       Current Medications:     Current Outpatient Medications   Medication Sig Dispense Refill    losartan (COZAAR) 25 mg tablet Take 1 tablet (25 mg total) by mouth daily 90 tablet 3    EPINEPHrine (EPIPEN) 0 3 mg/0 3 mL SOAJ Inject 0 3 mL (0 3 mg total) into a muscle once for 1 dose 0 6 mL 0    loratadine (CLARITIN) 10 mg tablet Take 1 tablet by mouth daily      Propylene Glycol (SYSTANE COMPLETE OP) Apply to eye if needed      rOPINIRole (REQUIP) 1 mg tablet Take 2 tabs at 5:30pm and if needed take 1 tab at 8pm  270 tablet 2    vitamin E, tocopherol, 400 units capsule        No current facility-administered medications for this visit  Allergies: Allergies   Allergen Reactions    Amoxicillin Edema     Category:  Allergy;     Other Sneezing    Seasonal Ic [Cholestatin] Sneezing    Wound Dressing Adhesive Rash      Physical Exam:     /80   Pulse 77   Temp 98 2 °F (36 8 °C) "Ht 5' 1\" (1 549 m)   Wt 62 6 kg (138 lb)   SpO2 99%   BMI 26 07 kg/m²     Physical Exam  Vitals and nursing note reviewed  Constitutional:       General: She is not in acute distress  Appearance: She is well-developed  She is not diaphoretic  HENT:      Head: Normocephalic and atraumatic  Right Ear: External ear normal       Left Ear: External ear normal    Eyes:      Conjunctiva/sclera: Conjunctivae normal    Cardiovascular:      Rate and Rhythm: Normal rate and regular rhythm  Heart sounds: Normal heart sounds  No murmur heard  No friction rub  No gallop  Pulmonary:      Effort: Pulmonary effort is normal  No respiratory distress  Breath sounds: Normal breath sounds  No stridor  No wheezing or rales  Chest:      Chest wall: No tenderness  Musculoskeletal:         General: No swelling  Back:       Left hip: No tenderness  Normal range of motion  Right lower leg: No edema  Left lower leg: No edema  Skin:            Comments: Multiple nevi  Mild cracking L side of lips   Neurological:      General: No focal deficit present  Mental Status: She is alert  Psychiatric:         Behavior: Behavior normal          Thought Content: Thought content normal          Judgment: Judgment normal         EKG: normal EKG, normal sinus rhythm      Tadeo Bell MD  [address] [place] [address]  "

## 2023-05-05 NOTE — ASSESSMENT & PLAN NOTE
Discontinue HCTZ due to concerns about photosensitivity  We will start losartan 25 mg daily    Advised to monitor blood pressure at home/work

## 2023-05-05 NOTE — ASSESSMENT & PLAN NOTE
Advised to avoid anything with preservatives or flavors  Use plain Vaseline    Lips appear to be healing

## 2023-05-30 ENCOUNTER — TELEPHONE (OUTPATIENT)
Dept: PSYCHIATRY | Facility: CLINIC | Age: 43
End: 2023-05-30

## 2023-06-06 ENCOUNTER — TELEPHONE (OUTPATIENT)
Dept: PSYCHIATRY | Facility: CLINIC | Age: 43
End: 2023-06-06

## 2023-06-06 NOTE — TELEPHONE ENCOUNTER
Ref pt to [facility] ([phone])  Pt is aware referral will be sent over and closed on our end  Referral for ADHD evaluation

## 2023-08-14 ENCOUNTER — TELEPHONE (OUTPATIENT)
Dept: NEUROLOGY | Facility: CLINIC | Age: 43
End: 2023-08-14

## 2023-08-21 ENCOUNTER — TELEMEDICINE (OUTPATIENT)
Dept: NEUROLOGY | Facility: CLINIC | Age: 43
End: 2023-08-21
Payer: COMMERCIAL

## 2023-08-21 DIAGNOSIS — G89.29 CHRONIC LOWER BACK PAIN: ICD-10-CM

## 2023-08-21 DIAGNOSIS — G25.81 RESTLESS LEGS SYNDROME (RLS): Primary | ICD-10-CM

## 2023-08-21 DIAGNOSIS — Z86.69 H/O MIGRAINE: ICD-10-CM

## 2023-08-21 DIAGNOSIS — M54.50 CHRONIC LOWER BACK PAIN: ICD-10-CM

## 2023-08-21 PROCEDURE — 99214 OFFICE O/P EST MOD 30 MIN: CPT | Performed by: NURSE PRACTITIONER

## 2023-08-21 NOTE — PATIENT INSTRUCTIONS
Continue Requip 2 mg daily at 5:00 PM, can repeat [place] at 8 PM if needed. Can premedicate for postcoital headaches with Tylenol and or ibuprofen  Referrals for sleep medicine regarding restless leg and possible sleep disruptions  Referrals for PM and R for physiatry evaluation of low back and hip tightness, requesting evaluation for stretching, muscle releasing and possible trigger point injections. Follow-up with outpatient neurology office in 6 months or sooner if needed.
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
## 2023-08-22 ENCOUNTER — TELEPHONE (OUTPATIENT)
Age: 43
End: 2023-08-22

## 2023-08-22 NOTE — TELEPHONE ENCOUNTER
Left message for pt to call office to schedule appt for ss consult with dr Daniela Dobson for restless leg syndrome from referral list

## 2023-09-15 ENCOUNTER — APPOINTMENT (OUTPATIENT)
Dept: LAB | Facility: CLINIC | Age: 43
End: 2023-09-15

## 2023-09-15 ENCOUNTER — OFFICE VISIT (OUTPATIENT)
Dept: FAMILY MEDICINE CLINIC | Facility: CLINIC | Age: 43
End: 2023-09-15
Payer: COMMERCIAL

## 2023-09-15 VITALS
WEIGHT: 138 LBS | SYSTOLIC BLOOD PRESSURE: 123 MMHG | OXYGEN SATURATION: 99 % | TEMPERATURE: 98 F | HEART RATE: 70 BPM | HEIGHT: 61 IN | DIASTOLIC BLOOD PRESSURE: 78 MMHG | BODY MASS INDEX: 26.06 KG/M2

## 2023-09-15 DIAGNOSIS — H91.90 DECREASED HEARING, UNSPECIFIED LATERALITY: ICD-10-CM

## 2023-09-15 DIAGNOSIS — H69.80 DYSFUNCTION OF EUSTACHIAN TUBE, UNSPECIFIED LATERALITY: ICD-10-CM

## 2023-09-15 DIAGNOSIS — Z00.8 HEALTH EXAMINATION IN POPULATION SURVEY: ICD-10-CM

## 2023-09-15 DIAGNOSIS — H92.02 LEFT EAR PAIN: Primary | ICD-10-CM

## 2023-09-15 PROBLEM — R07.9 CHEST PAIN: Status: RESOLVED | Noted: 2023-05-05 | Resolved: 2023-09-15

## 2023-09-15 PROBLEM — H69.90 DYSFUNCTION OF EUSTACHIAN TUBE: Status: ACTIVE | Noted: 2023-09-15

## 2023-09-15 PROBLEM — L73.9 FOLLICULITIS: Status: RESOLVED | Noted: 2023-05-05 | Resolved: 2023-09-15

## 2023-09-15 PROBLEM — K13.0 ANGULAR CHEILITIS: Status: RESOLVED | Noted: 2023-05-05 | Resolved: 2023-09-15

## 2023-09-15 LAB
CHOLEST SERPL-MCNC: 167 MG/DL
HDLC SERPL-MCNC: 38 MG/DL
LDLC SERPL CALC-MCNC: 95 MG/DL (ref 0–100)
NONHDLC SERPL-MCNC: 129 MG/DL
TRIGL SERPL-MCNC: 168 MG/DL

## 2023-09-15 PROCEDURE — 99214 OFFICE O/P EST MOD 30 MIN: CPT | Performed by: FAMILY MEDICINE

## 2023-09-15 PROCEDURE — 83036 HEMOGLOBIN GLYCOSYLATED A1C: CPT

## 2023-09-15 PROCEDURE — 80061 LIPID PANEL: CPT

## 2023-09-15 PROCEDURE — 36415 COLL VENOUS BLD VENIPUNCTURE: CPT

## 2023-09-15 RX ORDER — FLUTICASONE PROPIONATE 50 MCG
1 SPRAY, SUSPENSION (ML) NASAL DAILY
Qty: 16 G | Refills: 5 | Status: SHIPPED | OUTPATIENT
Start: 2023-09-15

## 2023-09-15 NOTE — PROGRESS NOTES
Mona Leti 1980 female MRN: [id number]    Acute Visit        ASSESSMENT/PLAN  Problem List Items Addressed This Visit        Nervous and Auditory    Dysfunction of eustachian tube     Start Flonase         Relevant Medications    fluticasone (FLONASE) 50 mcg/act nasal spray    Other Relevant Orders    Ambulatory Referral to Otolaryngology       Other    Left ear pain - Primary     Start Flonase, continue Ibuprofen as needed         Relevant Medications    fluticasone (FLONASE) 50 mcg/act nasal spray    Other Relevant Orders    Ambulatory Referral to Otolaryngology    Decreased hearing     Refer to ENT for audiology evaluation          Relevant Orders    Ambulatory Referral to Otolaryngology             Future Appointments   Date Time Provider [address]   11/30/2023  3:00 PM Fran Rodríguez MD [facility]   12/22/2023  1:40 PM Armin Swift MD [facility]   1/4/2024  1:20 PM RUMA Linda [facility]   1/5/2024  7:30 AM MO MAMMO RBC 1 MO RBC Mammo MO RBC        SUBJECTIVE  CC: Sinus congestion  and Earache (Left ear pain)       She developed ear pain yesterday L>R. Some sinus pressure frontal. No fevers, chills, or cough. Took Motrin w/ some relief. She says she has constant ear popping for years. She does take allergy medication (Claritin)  She had an evaluation by neuropsychiatry for possible ADHD. They expressed concerns that she may have difficulty with her hearing/processing. She does feel that sometimes she doesn't hear people or it takes her longer to hear them/process what they say. Earache   There is pain in the left ear. This is a new problem. The current episode started today. The problem occurs every few minutes. The problem has been gradually worsening. There has been no fever. The fever has been present for less than 1 day. The pain is at a severity of 9/10. Associated symptoms include headaches.  Pertinent negatives include no abdominal pain, coughing, diarrhea, ear discharge, hearing loss, neck pain, rash, rhinorrhea, sore throat or vomiting. Zeke Dupree is a 41 y.o. female who presented for an acute visit complaining of  Review of Systems   HENT: Positive for ear pain. Negative for ear discharge, hearing loss, rhinorrhea and sore throat. Ear popping   Respiratory: Negative for cough. Gastrointestinal: Negative for abdominal pain, diarrhea and vomiting. Musculoskeletal: Negative for neck pain. Skin: Negative for rash. Neurological: Positive for headaches.        Historical Information   The patient history was reviewed as follows:  Past Medical History:   Diagnosis Date   • Cataract     left eye   • Hot flashes    • Hypertension    • Kidney stone    • Migraine     menstural migraines   • Restless leg syndrome      Past Surgical History:   Procedure Laterality Date   • CATARACT EXTRACTION Left    • EYE SURGERY     • FL RETROGRADE PYELOGRAM  03/09/2021   • CT CYSTO/URETERO W/LITHOTRIPSY &INDWELL STENT INSRT Left 03/09/2021    Procedure: CYSTOSCOPY URETEROSCOPY WITH LITHOTRIPSY HOLMIUM LASER, RETROGRADE PYELOGRAM AND INSERTION STENT URETERAL;  Surgeon: Santana Delgado MD;  Location[facility];  Service: Urology   • WISDOM TOOTH EXTRACTION Bilateral      Family History   Problem Relation Age of Onset   • Hypertension Mother    • Thyroid disease Mother    • Uterine cancer Mother    • Oophorectomy Mother    • Hypertension Father    • Diabetes Father    • No Known Problems Daughter    • Diabetes Maternal Grandmother    • Alzheimer's disease Maternal Grandmother    • Alzheimer's disease Maternal Grandfather    • Heart disease Paternal Grandfather    • Heart attack Paternal Grandfather    • No Known Problems Paternal Aunt       Social History   Social History     Substance and Sexual Activity   Alcohol Use Yes    Comment: Social     Social History     Substance and Sexual Activity   Drug Use No     Social History Tobacco Use   Smoking Status Former   Smokeless Tobacco Never       Medications:   Meds/Allergies   Current Outpatient Medications   Medication Sig Dispense Refill   • fluticasone (FLONASE) 50 mcg/act nasal spray 1 spray into each nostril daily 16 g 5   • EPINEPHrine (EPIPEN) 0.3 mg/0.3 mL SOAJ Inject 0.3 mL (0.3 mg total) into a muscle once for 1 dose 0.6 mL 0   • loratadine (CLARITIN) 10 mg tablet Take 1 tablet by mouth daily     • losartan (COZAAR) 25 mg tablet Take 1 tablet (25 mg total) by mouth daily 90 tablet 3   • Magnesium 400 MG CAPS Take by mouth     • Propylene Glycol (SYSTANE COMPLETE OP) Apply to eye if needed     • rOPINIRole (REQUIP) 1 mg tablet Take 2 tabs at 5:30pm and if needed take 1 tab at 8pm. 270 tablet 2   • vitamin E, tocopherol, 400 units capsule        No current facility-administered medications for this visit. Allergies   Allergen Reactions   • Amoxicillin Edema     Category: Allergy;    • Other Sneezing   • Wound Dressing Adhesive Rash       OBJECTIVE  Vitals:   Vitals:    09/15/23 0724   BP: 123/78   Pulse: 70   Temp: 98 °F (36.7 °C)   SpO2: 99%   Weight: 62.6 kg (138 lb)   Height: 5' 1" (1.549 m)       Invasive Devices     Peripheral Intravenous Line  Duration           Peripheral IV 03/08/21 Left Antecubital 920 days                Physical Exam  Vitals and nursing note reviewed. Constitutional:       General: She is not in acute distress. Appearance: She is well-developed. HENT:      Right Ear: Hearing, tympanic membrane, ear canal and external ear normal.      Left Ear: Hearing, tympanic membrane, ear canal and external ear normal.      Nose: Nose normal.      Mouth/Throat:      Mouth: Mucous membranes are moist.      Pharynx: Oropharynx is clear. Uvula midline. No oropharyngeal exudate or posterior oropharyngeal erythema. Eyes:      Conjunctiva/sclera: Conjunctivae normal.      Pupils: Pupils are equal, round, and reactive to light.    Cardiovascular:      Rate and Rhythm: Normal rate. Heart sounds: Normal heart sounds. No murmur heard. No friction rub. No gallop. Pulmonary:      Effort: Pulmonary effort is normal. No respiratory distress. Breath sounds: Normal breath sounds. No wheezing or rales. Lymphadenopathy:      Cervical: No cervical adenopathy. Skin:     General: Skin is warm. Findings: No rash. Neurological:      General: No focal deficit present. Mental Status: She is alert and oriented to person, place, and time. Psychiatric:         Mood and Affect: Mood normal.         Behavior: Behavior normal.         Thought Content:  Thought content normal.         Judgment: Judgment normal.

## 2023-09-16 LAB
EST. AVERAGE GLUCOSE BLD GHB EST-MCNC: 105 MG/DL
HBA1C MFR BLD: 5.3 %

## 2023-11-20 ENCOUNTER — TELEPHONE (OUTPATIENT)
Dept: SLEEP CENTER | Facility: CLINIC | Age: 43
End: 2023-11-20

## 2023-11-20 NOTE — TELEPHONE ENCOUNTER
----- Message from Clayton Collazo MD sent at 11/19/2023  8:38 AM EST -----  approved  ----- Message -----  From: Dulce Chi  Sent: 11/15/2023   8:55 AM EST  To: Sleep Medicine [facility] Provider    This HOME sleep study needs approval.     If approved please sign and return to clerical pool. If denied please include reasons why. Also provide alternative testing if warranted. Please sign and return to clerical pool.

## 2023-11-28 ENCOUNTER — HOSPITAL ENCOUNTER (OUTPATIENT)
Dept: SLEEP CENTER | Facility: HOSPITAL | Age: 43
Discharge: HOME | End: 2023-11-28
Attending: OTOLARYNGOLOGY
Payer: COMMERCIAL

## 2023-11-28 DIAGNOSIS — G47.9 SLEEP DISTURBANCE: ICD-10-CM

## 2023-11-28 PROCEDURE — G0399 HOME SLEEP TEST/TYPE 3 PORTA: HCPCS

## 2023-11-29 PROBLEM — G47.33 OSA (OBSTRUCTIVE SLEEP APNEA): Status: ACTIVE | Noted: 2023-11-29

## 2023-11-30 NOTE — PROGRESS NOTES
Home Sleep Study Documentation    HOME STUDY DEVICE: Noxturnal no                                           Deidre G3 yes      Pre-Sleep Home Study:    Set-up and instructions performed by: Beto Sánchez performed demonstration for Patient: yes    Return demonstration performed by Patient: yes    Written instructions provided to Patient: yes    Patient signed consent form: yes        Post-Sleep Home Study:    Additional comments by Patient:     Home Sleep Study Failed:no:    Failure reason: N/A    Reported or Detected: N/A    Scored by: FROILAN Shelby

## 2023-12-03 PROBLEM — G47.30 SLEEP APNEA: Status: ACTIVE | Noted: 2023-11-29

## 2023-12-03 PROCEDURE — 95806 SLEEP STUDY UNATT&RESP EFFT: CPT | Performed by: PSYCHIATRY & NEUROLOGY

## 2023-12-19 DIAGNOSIS — G25.81 RLS (RESTLESS LEGS SYNDROME): ICD-10-CM

## 2023-12-19 RX ORDER — ROPINIROLE 1 MG/1
TABLET, FILM COATED ORAL
Qty: 270 TABLET | Refills: 1 | Status: SHIPPED | OUTPATIENT
Start: 2023-12-19

## 2024-02-07 ENCOUNTER — OFFICE VISIT (OUTPATIENT)
Dept: FAMILY MEDICINE CLINIC | Facility: CLINIC | Age: 44
End: 2024-02-07
Payer: COMMERCIAL

## 2024-02-07 VITALS
HEART RATE: 97 BPM | OXYGEN SATURATION: 98 % | TEMPERATURE: 97.5 F | BODY MASS INDEX: 26.47 KG/M2 | SYSTOLIC BLOOD PRESSURE: 120 MMHG | WEIGHT: 140.2 LBS | HEIGHT: 61 IN | DIASTOLIC BLOOD PRESSURE: 74 MMHG

## 2024-02-07 DIAGNOSIS — Z11.4 ENCOUNTER FOR SCREENING FOR HUMAN IMMUNODEFICIENCY VIRUS (HIV): ICD-10-CM

## 2024-02-07 DIAGNOSIS — G25.81 RESTLESS LEGS SYNDROME (RLS): Primary | ICD-10-CM

## 2024-02-07 DIAGNOSIS — Z11.59 NEED FOR HEPATITIS C SCREENING TEST: ICD-10-CM

## 2024-02-07 DIAGNOSIS — R45.89: ICD-10-CM

## 2024-02-07 PROBLEM — R41.840 ATTENTION DEFICIT: Status: RESOLVED | Noted: 2022-04-20 | Resolved: 2024-02-07

## 2024-02-07 PROBLEM — H92.02 LEFT EAR PAIN: Status: RESOLVED | Noted: 2023-09-15 | Resolved: 2024-02-07

## 2024-02-07 PROCEDURE — 99214 OFFICE O/P EST MOD 30 MIN: CPT | Performed by: FAMILY MEDICINE

## 2024-02-07 RX ORDER — BUPROPION HYDROCHLORIDE 150 MG/1
150 TABLET ORAL EVERY MORNING
Qty: 30 TABLET | Refills: 5 | Status: SHIPPED | OUTPATIENT
Start: 2024-02-07 | End: 2024-08-05

## 2024-02-07 NOTE — PROGRESS NOTES
Assessment/Plan:         Problem List Items Addressed This Visit        Other    Restless legs syndrome (RLS) - Primary     Will check labs   Continue Requip         Relevant Orders    Basic metabolic panel    CBC and differential    Iron Panel (Includes Ferritin, Iron Sat%, Iron, and TIBC)    Defensive coping     Patient working with her therapist and will trial wellbutrin to help with mood aspect  Will get records from [facility]         Relevant Medications    buPROPion (WELLBUTRIN XL) 150 mg 24 hr tablet   Other Visit Diagnoses     Need for hepatitis C screening test        Relevant Orders    Hepatitis C antibody    Encounter for screening for human immunodeficiency virus (HIV)        Relevant Orders    HIV 1/2 AG/AB w Reflex [facility] for 2 yr old and above      Update labs  Patient was counseled and accepted HIV screening.      Subjective:      Patient ID: Shelley Kyle is a 43 y.o. female.    43 year old here to review recent updates with her health.    She had a neuropsychiatric evaluation (report not in chart).  States she was not diagnosed with ADHD, but was told she is very emotive and defensive. Denies depression. She is in therapy with her partner and has been for 3 years.     She has RLS and Requip is not helping.  She takes up 3 mg some nights. Can't keep legs still. Has not had iron or CBC checked.    Still dealing with ear issues - ears feel full and has pain on and off. Flonase made it worse.         The following portions of the patient's history were reviewed and updated as appropriate:   Past Medical History:  She has a past medical history of Cataract, Hot flashes, Hypertension, Kidney stone, Migraine, and Restless leg syndrome.,  _______________________________________________________________________  Medical Problems:  does not have any pertinent problems on file.,  _______________________________________________________________________  Past Surgical History:   has a past surgical history  that includes Eye surgery; Hatch tooth extraction (Bilateral); pr cysto/uretero w/lithotripsy &indwell stent insrt (Left, 03/09/2021); FL retrograde pyelogram (03/09/2021); Cataract extraction (Left); and Closed reduction nasal fracture.,  _______________________________________________________________________  Family History:  family history includes Alzheimer's disease in her maternal grandfather and maternal grandmother; Diabetes in her father and maternal grandmother; Heart attack in her paternal grandfather; Heart disease in her paternal grandfather; Hypertension in her father and mother; No Known Problems in her daughter and paternal aunt; Oophorectomy in her mother; Thyroid disease in her mother; Uterine cancer in her mother.,  _______________________________________________________________________  Social History:   reports that she has quit smoking. She has never used smokeless tobacco. She reports current alcohol use. She reports that she does not use drugs.,  _______________________________________________________________________  Allergies:  is allergic to amoxicillin, other, and wound dressing adhesive..  _______________________________________________________________________  Current Outpatient Medications   Medication Sig Dispense Refill   • buPROPion (WELLBUTRIN XL) 150 mg 24 hr tablet Take 1 tablet (150 mg total) by mouth every morning 30 tablet 5   • losartan (COZAAR) 25 mg tablet Take 1 tablet (25 mg total) by mouth daily 90 tablet 3   • Propylene Glycol (SYSTANE COMPLETE OP) Apply to eye if needed     • rOPINIRole (REQUIP) 1 mg tablet Take 2 tabs at 5:30pm and if needed take 1 tab at 8pm. 270 tablet 1   • vitamin E, tocopherol, 400 units capsule      • EPINEPHrine (EPIPEN) 0.3 mg/0.3 mL SOAJ Inject 0.3 mL (0.3 mg total) into a muscle once for 1 dose 0.6 mL 0     No current facility-administered medications for this visit.  "    _______________________________________________________________________  Review of Systems   HENT:  Positive for ear pain and tinnitus.         Ear fullness   Psychiatric/Behavioral:          Defensive, emotional         Objective:  Vitals:    02/07/24 0744   BP: 120/74   BP Location: Left arm   Patient Position: Sitting   Pulse: 97   Temp: 97.5 °F (36.4 °C)   TempSrc: Temporal   SpO2: 98%   Weight: 63.6 kg (140 lb 3.2 oz)   Height: 5' 1\" (1.549 m)     Body mass index is 26.49 kg/m².     Physical Exam  Vitals and nursing note reviewed.   Constitutional:       General: She is not in acute distress.     Appearance: Normal appearance. She is not ill-appearing, toxic-appearing or diaphoretic.   HENT:      Head: Normocephalic and atraumatic.   Cardiovascular:      Rate and Rhythm: Normal rate and regular rhythm.      Heart sounds: No murmur heard.     No friction rub.   Pulmonary:      Effort: Pulmonary effort is normal. No respiratory distress.      Breath sounds: Normal breath sounds. No stridor. No wheezing, rhonchi or rales.   Musculoskeletal:         General: No swelling.      Right lower leg: No edema.      Left lower leg: No edema.   Neurological:      General: No focal deficit present.      Mental Status: She is alert.      Sensory: Sensation is intact.   Psychiatric:         Attention and Perception: Attention normal.         Mood and Affect: Mood normal.         Speech: Speech normal.         "

## 2024-02-07 NOTE — ASSESSMENT & PLAN NOTE
Patient working with her therapist and will trial wellbutrin to help with mood aspect  Will get records from [facility]

## 2024-02-08 ENCOUNTER — TELEPHONE (OUTPATIENT)
Dept: FAMILY MEDICINE CLINIC | Facility: CLINIC | Age: 44
End: 2024-02-08

## 2024-02-08 DIAGNOSIS — R05.1 ACUTE COUGH: Primary | ICD-10-CM

## 2024-02-08 RX ORDER — DEXTROMETHORPHAN HYDROBROMIDE AND PROMETHAZINE HYDROCHLORIDE 15; 6.25 MG/5ML; MG/5ML
5 SYRUP ORAL 4 TIMES DAILY PRN
Qty: 240 ML | Refills: 0 | Status: SHIPPED | OUTPATIENT
Start: 2024-02-08 | End: 2024-02-09 | Stop reason: SDUPTHER

## 2024-02-08 NOTE — TELEPHONE ENCOUNTER
2/8/2024 spoke with pt and she feels worse today no fever, dry cough, PND and upset stomach. What do you want her to do.

## 2024-02-09 DIAGNOSIS — R05.1 ACUTE COUGH: ICD-10-CM

## 2024-02-09 RX ORDER — DEXTROMETHORPHAN HYDROBROMIDE AND PROMETHAZINE HYDROCHLORIDE 15; 6.25 MG/5ML; MG/5ML
5 SYRUP ORAL 4 TIMES DAILY PRN
Qty: 240 ML | Refills: 0 | Status: SHIPPED | OUTPATIENT
Start: 2024-02-09

## 2024-03-04 ENCOUNTER — OFFICE VISIT (OUTPATIENT)
Dept: FAMILY MEDICINE CLINIC | Facility: CLINIC | Age: 44
End: 2024-03-04
Payer: COMMERCIAL

## 2024-03-04 VITALS
WEIGHT: 133 LBS | HEIGHT: 61 IN | SYSTOLIC BLOOD PRESSURE: 140 MMHG | TEMPERATURE: 98.4 F | OXYGEN SATURATION: 99 % | BODY MASS INDEX: 25.11 KG/M2 | HEART RATE: 92 BPM | DIASTOLIC BLOOD PRESSURE: 92 MMHG

## 2024-03-04 DIAGNOSIS — R05.9 COUGH, UNSPECIFIED TYPE: ICD-10-CM

## 2024-03-04 DIAGNOSIS — J04.0 LARYNGITIS: ICD-10-CM

## 2024-03-04 DIAGNOSIS — H66.92 LEFT OTITIS MEDIA, UNSPECIFIED OTITIS MEDIA TYPE: ICD-10-CM

## 2024-03-04 DIAGNOSIS — H92.02 EARACHE ON LEFT: Primary | ICD-10-CM

## 2024-03-04 DIAGNOSIS — J32.9 SINUSITIS, UNSPECIFIED CHRONICITY, UNSPECIFIED LOCATION: ICD-10-CM

## 2024-03-04 LAB
SARS-COV-2 AG UPPER RESP QL IA: NEGATIVE
VALID CONTROL: NORMAL

## 2024-03-04 PROCEDURE — 99213 OFFICE O/P EST LOW 20 MIN: CPT | Performed by: PHYSICIAN ASSISTANT

## 2024-03-04 PROCEDURE — 87811 SARS-COV-2 COVID19 W/OPTIC: CPT | Performed by: PHYSICIAN ASSISTANT

## 2024-03-04 RX ORDER — AZITHROMYCIN 250 MG/1
TABLET, FILM COATED ORAL DAILY
Qty: 6 TABLET | Refills: 0 | Status: SHIPPED | OUTPATIENT
Start: 2024-03-04 | End: 2024-03-09

## 2024-03-04 NOTE — PROGRESS NOTES
Name: Shelley Kyle      : 1980      MRN: [id number]  Encounter Provider: Minh Mobley PA-C  Encounter Date: 3/4/2024   Encounter department: [facility]    Assessment & Plan     1. Earache on left  -     POCT Rapid Covid Ag    2. Sinusitis, unspecified chronicity, unspecified location  -     POCT Rapid Covid Ag    3. Laryngitis  -     POCT Rapid Covid Ag    4. Cough, unspecified type  -     POCT Rapid Covid Ag    5. Left otitis media, unspecified otitis media type  -     azithromycin (Zithromax) 250 mg tablet; Take 2 tablets (500 mg total) by mouth daily for 1 day, THEN 1 tablet (250 mg total) daily for 4 days.    Begin azithromycin, recommend tylenol, fluids, rest. Covid negative. Supportive measures and return precautions discussed. Follow up prn       Subjective     Pt presents with 3 days of ear pain, L > R, sinus pain, throat pain, loss of voice, HA, fatigue. No fever. Recently had a different viral illness a few weeks ago. Shares she has a lot of chronic sinus/ear problems. She has been taking Theraflu with some relief.     Earache   There is pain in the left ear. This is a new problem. The current episode started in the past 7 days. The problem occurs every few minutes. The problem has been waxing and waning. The pain is at a severity of 8/10. Associated symptoms include coughing and ear discharge. Pertinent negatives include no abdominal pain, diarrhea, hearing loss, rhinorrhea, sore throat or vomiting.   Sinusitis  Associated symptoms include coughing and ear pain. Pertinent negatives include no chills, congestion, shortness of breath, sinus pressure, sneezing or sore throat.   Fatigue  Associated symptoms include coughing and fatigue. Pertinent negatives include no abdominal pain, chest pain, chills, congestion, fever, nausea, numbness, sore throat or vomiting.   Cough  Associated symptoms include ear pain. Pertinent negatives include no chest pain, chills, fever,  postnasal drip, rhinorrhea, sore throat, shortness of breath or wheezing.     Review of Systems   Constitutional:  Positive for fatigue. Negative for chills and fever.   HENT:  Positive for ear discharge, ear pain and sinus pain. Negative for congestion, hearing loss, nosebleeds, postnasal drip, rhinorrhea, sinus pressure, sneezing and sore throat.    Eyes:  Negative for pain, discharge, itching and visual disturbance.   Respiratory:  Positive for cough. Negative for chest tightness, shortness of breath and wheezing.    Cardiovascular:  Negative for chest pain, palpitations and leg swelling.   Gastrointestinal:  Negative for abdominal pain, blood in stool, constipation, diarrhea, nausea and vomiting.   Genitourinary:  Negative for frequency and urgency.   Neurological:  Negative for dizziness, light-headedness and numbness.       Past Medical History:   Diagnosis Date   • Cataract     left eye   • Hot flashes    • Hypertension    • Kidney stone    • Migraine     menstural migraines   • Restless leg syndrome      Past Surgical History:   Procedure Laterality Date   • CATARACT EXTRACTION Left    • CLOSED REDUCTION NASAL FRACTURE      in her 20s   • EYE SURGERY     • FL RETROGRADE PYELOGRAM  03/09/2021   • ID CYSTO/URETERO W/LITHOTRIPSY &INDWELL STENT INSRT Left 03/09/2021    Procedure: CYSTOSCOPY URETEROSCOPY WITH LITHOTRIPSY HOLMIUM LASER, RETROGRADE PYELOGRAM AND INSERTION STENT URETERAL;  Surgeon: Anthony Mas MD;  Location: [facility];  Service: Urology   • WISDOM TOOTH EXTRACTION Bilateral      Family History   Problem Relation Age of Onset   • Hypertension Mother    • Thyroid disease Mother    • Uterine cancer Mother    • Oophorectomy Mother    • Hypertension Father    • Diabetes Father    • No Known Problems Daughter    • Diabetes Maternal Grandmother    • Alzheimer's disease Maternal Grandmother    • Alzheimer's disease Maternal Grandfather    • Heart disease Paternal Grandfather    • Heart attack  Paternal Grandfather    • No Known Problems Paternal Aunt      Social History     Socioeconomic History   • Marital status: Single     Spouse name: None   • Number of children: None   • Years of education: None   • Highest education level: None   Occupational History   • None   Tobacco Use   • Smoking status: Former   • Smokeless tobacco: Never   Vaping Use   • Vaping status: Never Used   Substance and Sexual Activity   • Alcohol use: Yes     Comment: Social   • Drug use: No   • Sexual activity: Yes     Partners: Female     Birth control/protection: None     Comment: 1 partner   Other Topics Concern   • None   Social History Narrative   • None     Social Determinants of Health     Financial Resource Strain: Not on file   Food Insecurity: Not on file   Transportation Needs: Not on file   Physical Activity: Not on file   Stress: Not on file   Social Connections: Not on file   Intimate Partner Violence: Not on file   Housing Stability: Not on file     Current Outpatient Medications on File Prior to Visit   Medication Sig   • buPROPion (WELLBUTRIN XL) 150 mg 24 hr tablet Take 1 tablet (150 mg total) by mouth every morning   • EPINEPHrine (EPIPEN) 0.3 mg/0.3 mL SOAJ Inject 0.3 mL (0.3 mg total) into a muscle once for 1 dose   • losartan (COZAAR) 25 mg tablet Take 1 tablet (25 mg total) by mouth daily   • Propylene Glycol (SYSTANE COMPLETE OP) Apply to eye if needed   • rOPINIRole (REQUIP) 1 mg tablet Take 2 tabs at 5:30pm and if needed take 1 tab at 8pm.   • vitamin E, tocopherol, 400 units capsule    • [DISCONTINUED] baclofen 10 mg tablet Take 1 tablet (10 mg total) by mouth 3 (three) times a day for 10 days   • [DISCONTINUED] ibuprofen (MOTRIN) 800 mg tablet Take 1 tablet (800 mg total) by mouth 3 (three) times a day for 15 days   • [DISCONTINUED] promethazine-dextromethorphan (PHENERGAN-DM) 6.25-15 mg/5 mL oral syrup Take 5 mL by mouth 4 (four) times a day as needed for cough     Allergies   Allergen Reactions   •  "Amoxicillin Edema     Category: Allergy;    • Other Sneezing   • Wound Dressing Adhesive Rash     Immunization History   Administered Date(s) Administered   • COVID-19 PFIZER VACCINE 0.3 ML IM 12/23/2020, 01/14/2021, 10/08/2021   • INFLUENZA 10/09/2018, 10/16/2019, 11/05/2021   • Influenza, seasonal, injectable 10/01/2015   • Tdap 10/18/2014, 10/18/2014       Objective     /92 (BP Location: Left arm, Patient Position: Sitting, Cuff Size: Adult)   Pulse 92   Temp 98.4 °F (36.9 °C)   Ht 5' 1\" (1.549 m)   Wt 60.3 kg (133 lb)   SpO2 99%   BMI 25.13 kg/m²     Physical Exam  Vitals and nursing note reviewed.   Constitutional:       General: She is not in acute distress.     Appearance: Normal appearance.   HENT:      Head: Normocephalic and atraumatic.      Right Ear: Tympanic membrane is erythematous.      Left Ear: Swelling present. Tympanic membrane is erythematous.      Ears:      Comments: Swelling and mild drainage noted L EAC, unable to fully visualize the L TM though visualized portion is quite erythematous     Nose: Nose normal.      Mouth/Throat:      Mouth: Mucous membranes are moist.      Pharynx: Oropharynx is clear. Posterior oropharyngeal erythema present. No oropharyngeal exudate.   Eyes:      Pupils: Pupils are equal, round, and reactive to light.   Cardiovascular:      Rate and Rhythm: Normal rate and regular rhythm.      Heart sounds: Normal heart sounds. No murmur heard.  Pulmonary:      Effort: Pulmonary effort is normal. No respiratory distress.      Breath sounds: Normal breath sounds. No wheezing, rhonchi or rales.   Musculoskeletal:         General: Normal range of motion.      Cervical back: Normal range of motion and neck supple.   Skin:     General: Skin is warm and dry.   Neurological:      Mental Status: She is alert and oriented to person, place, and time.   Psychiatric:         Mood and Affect: Mood and affect normal.       Minh Mobley PA-C    "

## 2024-03-04 NOTE — LETTER
March 4, 2024     Patient: Shelley Kyle  YOB: 1980  Date of Visit: 3/4/2024      To Whom it May Concern:    Shelley Kyle is under my professional care. Shelley was seen in my office on 3/4/2024. Shelley may return to work on 3/6/2024 . Please excuse her 3/4 and 3/5/2024    If you have any questions or concerns, please don't hesitate to call.         Sincerely,          Minh Mobley PA-C        CC: No Recipients

## 2024-03-06 ENCOUNTER — TELEPHONE (OUTPATIENT)
Dept: FAMILY MEDICINE CLINIC | Facility: CLINIC | Age: 44
End: 2024-03-06

## 2024-03-06 NOTE — TELEPHONE ENCOUNTER
Hi, this is Shelley Saroj. YOB: 1980. I saw Minh Mobley on Monday. He gave me a work note to be out Monday, Tuesday. I still have no choice. I would like to have another day out for Wednesday, March 6th. If somebody could give me a call. Or if the letter can be generated in my chart it would be super appreciated. Again. Shelley Kyle August 24th, 1980 I would like to extend my work absence note to March 6. Thank you.

## 2024-03-07 DIAGNOSIS — H92.02 EARACHE ON LEFT: ICD-10-CM

## 2024-03-07 DIAGNOSIS — H92.02 EARACHE ON LEFT: Primary | ICD-10-CM

## 2024-03-07 RX ORDER — PREDNISONE 20 MG/1
40 TABLET ORAL DAILY
Qty: 10 TABLET | Refills: 0 | Status: SHIPPED | OUTPATIENT
Start: 2024-03-07 | End: 2024-03-07 | Stop reason: SDUPTHER

## 2024-03-07 RX ORDER — PREDNISONE 20 MG/1
40 TABLET ORAL DAILY
Qty: 10 TABLET | Refills: 0 | Status: SHIPPED | OUTPATIENT
Start: 2024-03-07 | End: 2024-03-12

## 2024-03-11 DIAGNOSIS — G25.81 RLS (RESTLESS LEGS SYNDROME): ICD-10-CM

## 2024-03-11 RX ORDER — ROPINIROLE 1 MG/1
TABLET, FILM COATED ORAL
Qty: 270 TABLET | Refills: 1 | Status: SHIPPED | OUTPATIENT
Start: 2024-03-11

## 2024-03-11 NOTE — TELEPHONE ENCOUNTER
From: Shelley Kyle  To: Office of RUMA Sanchez  Sent: 3/11/2024 6:14 AM EDT  Subject: Medication Renewal Request    Refills have been requested for the following medications:     rOPINIRole (REQUIP) 1 mg tablet [RUMA Sanchez]   Patient Comment: Please send to [facility] so it can be couriered to [facility].     [place] [facility] [place] [address] [place] [address]

## 2024-04-29 ENCOUNTER — APPOINTMENT (OUTPATIENT)
Dept: RADIOLOGY | Facility: CLINIC | Age: 44
End: 2024-04-29
Payer: COMMERCIAL

## 2024-04-29 ENCOUNTER — OFFICE VISIT (OUTPATIENT)
Dept: URGENT CARE | Facility: CLINIC | Age: 44
End: 2024-04-29
Payer: COMMERCIAL

## 2024-04-29 VITALS
HEART RATE: 86 BPM | TEMPERATURE: 98.5 F | SYSTOLIC BLOOD PRESSURE: 156 MMHG | DIASTOLIC BLOOD PRESSURE: 88 MMHG | OXYGEN SATURATION: 100 % | RESPIRATION RATE: 18 BRPM

## 2024-04-29 DIAGNOSIS — M25.572 ACUTE LEFT ANKLE PAIN: ICD-10-CM

## 2024-04-29 DIAGNOSIS — S93.402A SPRAIN OF LEFT ANKLE, UNSPECIFIED LIGAMENT, INITIAL ENCOUNTER: Primary | ICD-10-CM

## 2024-04-29 PROCEDURE — 73610 X-RAY EXAM OF ANKLE: CPT

## 2024-04-29 PROCEDURE — 99213 OFFICE O/P EST LOW 20 MIN: CPT

## 2024-04-29 NOTE — LETTER
April 29, 2024     Patient: Shelley Kyle   YOB: 1980   Date of Visit: 4/29/2024       To Whom it May Concern:    Shelley Kyle was seen in my clinic on 4/29/2024. She may return to work on 5/2/2024 .    If you have any questions or concerns, please don't hesitate to call.         Sincerely,          RUMA Nowak        CC: No Recipients

## 2024-04-29 NOTE — PATIENT INSTRUCTIONS
Check my chart for final radiology results.   Rest and elevation.   Air cast until pain improves.  Crutches as needed.   Ice for 15min, 3-4x daily.  Tylenol/Motrin as needed for pain    PCP follow up.  Follow up with ortho if pain does not improve over the next 5-7 days.   Proceed to the ER with new or worsening symptoms such as pain, swelling, loss of color or sensation.     Ankle Strain/Sprain  WHAT YOU NEED TO KNOW:   A muscle strain is a twist, pull, or tear of a muscle or tendon in your ankle. An acute strain is a strain that happens suddenly. A chronic strain can happen over several days or weeks. A chronic strain can be caused by moving your ankle the same way over and over.  DISCHARGE INSTRUCTIONS:   Return to the emergency department if:   You have severe pain in your ankle when you rest or put pressure on it.     Your foot or toes are cold or numb.    Your swelling has increased.    Contact your healthcare provider if:   Your pain or swelling do not go away, even after treatment.    You have questions or concerns about your condition or care.    Medicines: You may need any of the following:  NSAIDs , such as ibuprofen, help decrease swelling, pain, and fever. This medicine is available with or without a doctor's order. NSAIDs can cause stomach bleeding or kidney problems in certain people. If you take blood thinner medicine, always ask if NSAIDs are safe for you. Always read the medicine label and follow directions. Do not give these medicines to children under 6 months of age without direction from your child's healthcare provider.     Acetaminophen  decreases pain. It is available without a doctor's order. Ask how much to take and how often to take it. Follow directions. Acetaminophen can cause liver damage if not taken correctly.    Take your medicine as directed.  Contact your healthcare provider if you think your medicine is not helping or if you have side effects. Tell him of her if you are allergic  to any medicine. Keep a list of the medicines, vitamins, and herbs you take. Include the amounts, and when and why you take them. Bring the list or the pill bottles to follow-up visits. Carry your medicine list with you in case of an emergency.    Self-care:   Rest  your ankle so that it can heal. Return to normal activities as directed.    Apply ice on your ankle for 15 to 20 minutes every hour or as directed. Use an ice pack, or put crushed ice in a plastic bag. Cover it with a towel. Ice helps prevent tissue damage and decreases swelling and pain.    Compress  your ankle as directed. Ask your healthcare provider how to wrap an elastic bandage around your ankle. An elastic bandage provides support and helps decrease swelling and movement so your ankle can heal. Wear it as long as directed.    Elevate  your ankle above the level of your heart as often as you can. This will help decrease swelling and pain. Prop your ankle on pillows or blankets to keep it elevated comfortably.       Prevent an ankle strain:   Always wear proper shoes when you play sports.  Replace your old running shoes with new ones often if you are a runner. Use special shoe inserts or arch supports to correct leg or foot problems. Ask your healthcare provider for more information on shoe supports.    Do warm up and cool down exercises.  Stretch before you work out or do sports activities. This will help loosen your muscles and prevent injury. Cool down and stretch after your workout. Do not stop and rest after a workout without cooling down first.     Do strength training exercises.  Exercises such as weight lifting help keep your muscles flexible and strong. A physical therapist or  may help you with these exercises.     Slowly start your exercise or sports training program.  Follow your healthcare provider's advice on when to start exercising. Slowly increase time, distance, and intensity of your exercises. Sudden increases in how often  or how intensely you train may cause you to injure your muscle again.    Follow up with your doctor as directed:  Write down your questions so you remember to ask them during your visits.   © Copyright [employer] 2021 Information is for End User's use only and may not be sold, redistributed or otherwise used for commercial purposes. All illustrations and images included in CareNotes® are the copyrighted property of [employer] or [employer]  The above information is an  only. It is not intended as medical advice for individual conditions or treatments. Talk to your doctor, nurse or pharmacist before following any medical regimen to see if it is safe and effective for you.

## 2024-04-29 NOTE — PROGRESS NOTES
[facility]        NAME: Shelley Kyle is a 43 y.o. female  : 1980    MRN: [id number]  DATE: 2024  TIME: 6:45 PM    Assessment and Plan   Sprain of left ankle, unspecified ligament, initial encounter [S93.402A]  1. Sprain of left ankle, unspecified ligament, initial encounter  Ambulatory Referral to Orthopedic Surgery      2. Acute left ankle pain  XR ankle 3+ vw left        Xray appears negative for any fracture. Will follow up with radiologist report when available. Placed in air cast and given crutches.  Work note given.     Patient Instructions     Check my chart for final radiology results.   Rest and elevation.   Air cast until pain improves.  Crutches as needed.   Ice for 15min, 3-4x daily.  Tylenol/Motrin as needed for pain    PCP follow up.  Follow up with ortho if pain does not improve over the next 5-7 days.   Proceed to the ER with new or worsening symptoms such as pain, swelling, loss of color or sensation.     Chief Complaint     Chief Complaint   Patient presents with    Ankle Injury     Rolled ankle hiking. Today. Iced and elevated.          History of Present Illness       The patient presents today with complaints of L ankle pain and swelling after twisting it when hiking/running in the woods today. She was able to walk home with aid of a walking stick, but reports having significant pain with weight bearing. Denies previous history of fracture/surgery. Is using ice the the area.         Review of Systems   Review of Systems   Musculoskeletal:  Positive for arthralgias, gait problem and joint swelling.        L ankle         Current Medications       Current Outpatient Medications:     cholecalciferol 400 units tablet, Take 400 Units by mouth daily, Disp: , Rfl:     losartan (COZAAR) 25 mg tablet, Take 1 tablet (25 mg total) by mouth daily, Disp: 90 tablet, Rfl: 3    rOPINIRole (REQUIP) 1 mg tablet, Take 2 tabs at 5:30pm and if needed take 1 tab at 8pm., Disp: 270  tablet, Rfl: 1    vitamin E, tocopherol, 400 units capsule, , Disp: , Rfl:     EPINEPHrine (EPIPEN) 0.3 mg/0.3 mL SOAJ, Inject 0.3 mL (0.3 mg total) into a muscle once for 1 dose, Disp: 0.6 mL, Rfl: 0    Propylene Glycol (SYSTANE COMPLETE OP), Apply to eye if needed, Disp: , Rfl:     Current Allergies     Allergies as of 04/29/2024 - Reviewed 04/29/2024   Allergen Reaction Noted    Amoxicillin Edema 08/13/2015    Other Sneezing 02/27/2023    Wound dressing adhesive Rash 12/22/2022            The following portions of the patient's history were reviewed and updated as appropriate: allergies, current medications, past family history, past medical history, past social history, past surgical history and problem list.     Past Medical History:   Diagnosis Date    Cataract     left eye    Hot flashes     Hypertension     Kidney stone     Migraine     menstural migraines    Restless leg syndrome        Past Surgical History:   Procedure Laterality Date    CATARACT EXTRACTION Left     CLOSED REDUCTION NASAL FRACTURE      in her 20s    EYE SURGERY      FL RETROGRADE PYELOGRAM  03/09/2021    DC CYSTO/URETERO W/LITHOTRIPSY &INDWELL STENT INSRT Left 03/09/2021    Procedure: CYSTOSCOPY URETEROSCOPY WITH LITHOTRIPSY HOLMIUM LASER, RETROGRADE PYELOGRAM AND INSERTION STENT URETERAL;  Surgeon: Anthony Mas MD;  Location: [facility];  Service: Urology    WISDOM TOOTH EXTRACTION Bilateral        Family History   Problem Relation Age of Onset    Hypertension Mother     Thyroid disease Mother     Uterine cancer Mother     Oophorectomy Mother     Hypertension Father     Diabetes Father     No Known Problems Daughter     Diabetes Maternal Grandmother     Alzheimer's disease Maternal Grandmother     Alzheimer's disease Maternal Grandfather     Heart disease Paternal Grandfather     Heart attack Paternal Grandfather     No Known Problems Paternal Aunt          Medications have been verified.        Objective   /88   Pulse 86    Temp 98.5 °F (36.9 °C)   Resp 18   SpO2 100%        Physical Exam     Physical Exam  Vitals and nursing note reviewed.   Constitutional:       General: She is not in acute distress.     Appearance: Normal appearance.   HENT:      Head: Normocephalic and atraumatic.      Right Ear: External ear normal.      Left Ear: External ear normal.      Mouth/Throat:      Mouth: Mucous membranes are moist.   Eyes:      Pupils: Pupils are equal, round, and reactive to light.   Cardiovascular:      Rate and Rhythm: Normal rate.   Pulmonary:      Effort: Pulmonary effort is normal.   Musculoskeletal:      Left ankle: Swelling present. Tenderness present over the lateral malleolus. Decreased range of motion. Normal pulse.   Skin:     General: Skin is warm and dry.   Neurological:      Mental Status: She is alert and oriented to person, place, and time. Mental status is at baseline.   Psychiatric:         Mood and Affect: Mood normal.         Behavior: Behavior normal.

## 2024-06-26 ENCOUNTER — OFFICE VISIT (OUTPATIENT)
Dept: OBGYN CLINIC | Facility: CLINIC | Age: 44
End: 2024-06-26
Payer: COMMERCIAL

## 2024-06-26 VITALS
SYSTOLIC BLOOD PRESSURE: 154 MMHG | HEIGHT: 60 IN | HEART RATE: 64 BPM | WEIGHT: 138 LBS | DIASTOLIC BLOOD PRESSURE: 97 MMHG | BODY MASS INDEX: 27.09 KG/M2

## 2024-06-26 DIAGNOSIS — I10 ESSENTIAL HYPERTENSION: ICD-10-CM

## 2024-06-26 DIAGNOSIS — S93.492A HIGH ANKLE SPRAIN, LEFT, INITIAL ENCOUNTER: Primary | ICD-10-CM

## 2024-06-26 DIAGNOSIS — S93.402A MODERATE ANKLE SPRAIN, LEFT, INITIAL ENCOUNTER: ICD-10-CM

## 2024-06-26 DIAGNOSIS — S93.492A SPRAIN OF ANTERIOR TALOFIBULAR LIGAMENT OF LEFT ANKLE, INITIAL ENCOUNTER: ICD-10-CM

## 2024-06-26 PROCEDURE — 99203 OFFICE O/P NEW LOW 30 MIN: CPT | Performed by: FAMILY MEDICINE

## 2024-06-26 NOTE — PROGRESS NOTES
Assessment/Plan:  Assessment & Plan   Diagnoses and all orders for this visit:    High ankle sprain, left, initial encounter  -     Ambulatory Referral to Physical Therapy; Future    Sprain of anterior talofibular ligament of left ankle, initial encounter  -     Ambulatory Referral to Physical Therapy; Future    Moderate ankle sprain, left, initial encounter  -     Ambulatory Referral to Physical Therapy; Future        43-year-old female with onset of left ankle pain from injury 4/29/2024.  Discussed with patient physical exam, radiographs, impression, and plan.  X-rays of the left ankle are unremarkable for acute osseous abnormality.  Physical exam left ankle noted for lateral soft tissue swelling.  She has mild tenderness over the ATFL and AITFL.  She is limited range of motion with dorsiflexion and eversion however intact strength and ankle.  Passive external rotation and Synnamon squeeze are unremarkable.  Drawer testing is unremarkable.  She has normal sensation and dorsalis pedis pulse.  Clinical impression is that she may have sustained moderate ankle with moderate lower ankle sprain.  I discussed treatment regimen of supplements, topical anti-inflammatory, and formal therapy.  She is to start taking turmeric, tart cherry, and glucosamine supplements.  She is to apply topical diclofenac gel 3 times daily for the next 10 days.  She may do Epsom salt soaks.  She is to start physical therapy as soon as possible and do home exercises as directed.  She may consider wearing ankle sleeve during physical activity.  She will follow-up if no improvement after at least 4 weeks of formal therapy.        Subjective:   Patient ID: Shelley Kyle is a 43 y.o. female.  Chief Complaint   Patient presents with    Left Ankle - Pain        43-year-old female presents evaluation of left ankle pain sustained from injury on 4/29/2024.  She was hiking when she missed stepped on a rock and her ankle twisted.  She felt and heard a  pop and fell to the ground and pain.  She had pain described as sudden in onset, generalized and ankle but worse at the lateral aspect, none radiating, worse with movement and ambulating, associated with swelling, and improved with resting.  She presented to urgent care where x-ray evaluation was unremarkable for acute osseous abnormality.  She was provided Aircast and advised on resting.  Few days later she started experiencing bruising.  She began to full weight-bear after 1 day resting.  She had been managing symptoms with icing and once in a while taking ibuprofen.  Reports being better able to fully weight-bear but has difficulty with task such as stairs and walking on uneven surfaces.    Ankle Pain  This is a new problem. The current episode started more than 1 month ago. The problem occurs daily. The problem has been gradually worsening. Associated symptoms include arthralgias and joint swelling. Pertinent negatives include no numbness or weakness. The symptoms are aggravated by twisting. She has tried rest, position changes, NSAIDs and ice for the symptoms. The treatment provided mild relief.           The following portions of the patient's history were reviewed and updated as appropriate: She  has a past medical history of Cataract, Hot flashes, Hypertension, Kidney stone, Migraine, and Restless leg syndrome.  She is allergic to amoxicillin, other, and wound dressing adhesive..    Review of Systems   Musculoskeletal:  Positive for arthralgias and joint swelling.   Neurological:  Negative for weakness and numbness.       Objective:  Vitals:    06/26/24 1522   BP: 154/97   Pulse: 64   Weight: 62.6 kg (138 lb)   Height: 5' (1.524 m)      Left Ankle Exam     Range of Motion   Dorsiflexion:  10   Plantar flexion:  normal   Eversion:  5   Inversion:  normal     Muscle Strength   Dorsiflexion:  5/5   Plantar flexion:  5/5     Other   Sensation: normal  Pulse: present      Left Knee Exam     Muscle Strength   The  patient has normal left knee strength.    Tenderness   The patient is experiencing no tenderness.     Range of Motion   Extension:  normal           Observations   Left Ankle/Foot   Negative for deformity.     Tenderness   Left Ankle/Foot   Tenderness in the anterior ankle and anterior talofibular ligament. No tenderness in the Achilles insertion, fifth metatarsal base, calcaneofibular ligament, dorsum foot, lateral malleolus, medial malleolus, peroneal tendon, plantar fascia, posterior tibial tendon, posterior talofibular ligament, proximal Achilles and talar dome.     Additional Tenderness Details  Left  - AITFL    Strength/Myotome Testing     Left Ankle/Foot   Dorsiflexion: 5  Plantar flexion: 5  Inversion: 5  Eversion: 5    Tests   Left Ankle/Foot   Negative for anterior drawer, posterior drawer, syndesmosis squeeze and syndesmosis external rotation.       Physical Exam  Vitals and nursing note reviewed.   Constitutional:       Appearance: Normal appearance. She is well-developed. She is not ill-appearing or diaphoretic.   HENT:      Head: Normocephalic and atraumatic.      Right Ear: External ear normal.      Left Ear: External ear normal.   Eyes:      Conjunctiva/sclera: Conjunctivae normal.   Neck:      Trachea: No tracheal deviation.   Cardiovascular:      Rate and Rhythm: Normal rate.   Pulmonary:      Effort: Pulmonary effort is normal. No respiratory distress.   Abdominal:      General: There is no distension.   Musculoskeletal:         General: Swelling and tenderness present.      Left ankle: Tenderness present over the ATF ligament. No lateral malleolus, medial malleolus, CF ligament, posterior TF ligament or base of 5th metatarsal tenderness. Anterior drawer test negative.      Left foot: No deformity.   Skin:     General: Skin is warm and dry.      Coloration: Skin is not jaundiced or pale.   Neurological:      Mental Status: She is alert and oriented to person, place, and time.   Psychiatric:          Mood and Affect: Mood normal.         Behavior: Behavior normal.         Thought Content: Thought content normal.         Judgment: Judgment normal.         I have personally reviewed pertinent films in PACS and my interpretation is  .  No acute osseous abnormality of the left ankle

## 2024-06-26 NOTE — PATIENT INSTRUCTIONS
Over the counter vitamins:    - Turmeric vitamin at least 1000 mg daily    - Tart cherry vitamin at least 1000 mg daily    - Glucosamine-chondrointin 2-3 times daily    Copper ankle sleeve    Over the counter diclofenac gel/voltaren  - 3 times daily for 10 days    Epsom Salt Soak    Physical Therapy and home exercises

## 2024-06-27 RX ORDER — LOSARTAN POTASSIUM 25 MG/1
25 TABLET ORAL DAILY
Qty: 90 TABLET | Refills: 0 | Status: SHIPPED | OUTPATIENT
Start: 2024-06-27

## 2024-06-27 NOTE — TELEPHONE ENCOUNTER
Patient needs updated blood work and has previously placed orders. Please contact patient to go for labs. Courtesy refill provided.   Needs updated BMP.

## 2024-07-16 ENCOUNTER — EVALUATION (OUTPATIENT)
Dept: PHYSICAL THERAPY | Age: 44
End: 2024-07-16
Payer: COMMERCIAL

## 2024-07-16 DIAGNOSIS — S93.492A HIGH ANKLE SPRAIN OF LEFT LOWER EXTREMITY, INITIAL ENCOUNTER: Primary | ICD-10-CM

## 2024-07-16 PROCEDURE — 97161 PT EVAL LOW COMPLEX 20 MIN: CPT | Performed by: PHYSICAL THERAPIST

## 2024-07-16 PROCEDURE — 97110 THERAPEUTIC EXERCISES: CPT | Performed by: PHYSICAL THERAPIST

## 2024-07-16 NOTE — PROGRESS NOTES
PT Evaluation     Today's date: 2024  Patient name: Shelley Kyle  : 1980  MRN: [id number]  Referring provider: Maribeth Mendez*  Dx:   Encounter Diagnosis     ICD-10-CM    1. High ankle sprain of left lower extremity, initial encounter  S93.492A           Start Time: 1600  Stop Time: 1700  Total time in clinic (min): 60 minutes    Assessment  Impairments: abnormal gait, abnormal muscle tone, abnormal or restricted ROM, abnormal movement, activity intolerance, impaired balance, impaired physical strength, lacks appropriate home exercise program, pain with function, weight-bearing intolerance, poor posture  and poor body mechanics  Symptom irritability: low    Assessment details: Shelley Kyle is a 43 y.o. female who presents with pain, decreased strength, decreased ROM, joint effusion, and ambulatory dysfunction. Due to these impairments, Patient has difficulty performing a/iadls. Patient's clinical presentation is consistent with their referring diagnosis of left high ankle sprain. Patient would benefit from skilled physical therapy to address their aforementioned impairments, improve their level of function and to improve their overall quality of life.  Understanding of Dx/Px/POC: good     Prognosis: good    Goals  ST-3 WEEKS  1.  Decrease pain by 2 points on VAS at its worst.  2.  Increase ROM by > 5 deg in all deficients planes.  3.  Increase LE by 1/2 MMT grade in all deficient planes.    LT-6 WEEKS  1. Patient to be independent with a/iadls especially with walking and stairclimbing  2. Increase functional activities for leisure and home activities to previous LOF.  3. Independent with HEP and/or fitness program.    Plan  Patient would benefit from: skilled physical therapy  Planned modality interventions: cryotherapy, electrical stimulation/Russian stimulation, thermotherapy: hydrocollator packs and unattended electrical stimulation    Planned therapy interventions:  activity modification, behavior modification, body mechanics training, aquatic therapy, flexibility, functional ROM exercises, home exercise program, IADL retraining, joint mobilization, manual therapy, neuromuscular re-education, patient education, postural training, strengthening, stretching, therapeutic activities and therapeutic exercise    Frequency: 2x week (2-3x week)  Duration in weeks: 12  Plan of Care beginning date: 2024  Plan of Care expiration date: 10/16/2024  Treatment plan discussed with: patient      Subjective Evaluation    History of Present Illness  Date of onset: 2024  Mechanism of injury: Left ankle popped and twisted while walking on a trail and fell to the ground, used Aircast and crutches x 2 weeks complains of ankle weakness and tightness works as medical asst           Not a recurrent problem   Quality of life: good    Patient Goals  Patient goals for therapy: decreased pain, increased motion, increased strength and independence with ADLs/IADLs    Pain  Current pain ratin  At best pain ratin  At worst pain rating: 3  Quality: discomfort  Relieving factors: ice and rest  Aggravating factors: walking and stair climbing  Progression: improved    Social Support  Steps to enter house: yes  Stairs in house: yes   Lives in: one-story house  Lives with: significant other      Diagnostic Tests  X-ray: abnormal  Treatments  Previous treatment: immobilization      Objective     Static Posture     Ankle/Foot   Ankle/Foot (Left): Pes planus and pronated.   Ankle/Foot (Right): Pes planus and pronated.     Tenderness   Left Ankle/Foot   Tenderness in the anterior talofibular ligament and deltoid ligament.     Active Range of Motion   Left Ankle/Foot   Dorsiflexion (ke): 6 degrees   Dorsiflexion (kf): 8 degrees   Plantar flexion: 50 degrees   Inversion: 33 degrees   Eversion: 12 degrees     Right Ankle/Foot   Dorsiflexion (ke): 8 degrees   Dorsiflexion (kf): 10 degrees   Plantar flexion:  "52 degrees   Inversion: 35 degrees   Eversion: 15 degrees     Strength/Myotome Testing     Left Ankle/Foot   Dorsiflexion: 4  Plantar flexion: 4  Inversion: 4-  Eversion: 3+    Tests   Left Ankle/Foot   Negative for anterior drawer.     Ambulation     Observational Gait   Gait: antalgic   Decreased walking speed and stride length.     Functional Assessment        Single Leg Stance   Left: 8 seconds  Right: 9 seconds      Flowsheet Rows      Flowsheet Row Most Recent Value   PT/OT G-Codes    Current Score 54   Projected Score 73               Precautions:   POC expires Unit limit Auth Expiration date PT/OT + Visit Limit?   10/16/2024                              Visit/Unit Tracking  AUTH Status:  Date 7/16              needed Used 1               Remaining                 FOTO 54/73                     Manuals 7/16                         MT ankles 10 min                                      Neuro Re-Ed                          SLS foam             Biodex balance 5 min                                                                Ther Ex             bike 5 min            slant 30\"/4x            disc 10x ea            Pro stretch 10x            Calf press 35/30            Hip abd 35/30            Ankle MRE's 2x10 ea            HEP(RTB ankle inv/ev) 5 min            Ther Activity                                       Gait Training                                       Modalities                                            "

## 2024-07-23 ENCOUNTER — APPOINTMENT (OUTPATIENT)
Dept: PHYSICAL THERAPY | Age: 44
End: 2024-07-23
Payer: COMMERCIAL

## 2024-07-23 DIAGNOSIS — S93.492A HIGH ANKLE SPRAIN OF LEFT LOWER EXTREMITY, INITIAL ENCOUNTER: Primary | ICD-10-CM

## 2024-07-23 NOTE — PROGRESS NOTES
"Daily Note     Today's date: 2024  Patient name: Shelley Kyle  : 1980  MRN: [id number]  Referring provider: Maribeth Mendez*  Dx:   Encounter Diagnosis     ICD-10-CM    1. High ankle sprain of left lower extremity, initial encounter  S93.492A                      Subjective: ***      Objective: See treatment diary below      Assessment: Tolerated treatment {Tolerated treatment :8238039141}. Patient {assessment:2882026629}      Plan: {PLAN:2340648607}     Precautions:   POC expires Unit limit Auth Expiration date PT/OT + Visit Limit?   10/16/2024                              Visit/Unit Tracking  AUTH Status:  Date               needed Used 1               Remaining                 FOTO 54/73                     Manuals                          MT ankles 10 min                                      Neuro Re-Ed                          SLS foam             Biodex balance 5 min                                                                Ther Ex             bike 5 min            slant 30\"/4x            disc 10x ea            Pro stretch 10x            Calf press 35/30            Hip abd 35/30            Ankle MRE's 2x10 ea            HEP(RTB ankle inv/ev) 5 min            Ther Activity                                       Gait Training                                       Modalities                                            "

## 2024-07-30 ENCOUNTER — OFFICE VISIT (OUTPATIENT)
Dept: PHYSICAL THERAPY | Age: 44
End: 2024-07-30
Payer: COMMERCIAL

## 2024-07-30 DIAGNOSIS — S93.492A HIGH ANKLE SPRAIN OF LEFT LOWER EXTREMITY, INITIAL ENCOUNTER: Primary | ICD-10-CM

## 2024-07-30 PROCEDURE — 97110 THERAPEUTIC EXERCISES: CPT

## 2024-07-30 NOTE — PROGRESS NOTES
"Daily Note     Today's date: 2024  Patient name: Shelley Kyle  : 1980  MRN: [id number]  Referring provider: Maribeth Mendez*  Dx:   Encounter Diagnosis     ICD-10-CM    1. High ankle sprain of left lower extremity, initial encounter  S93.492A           Start Time: 1615  Stop Time: 1705  Total time in clinic (min): 50 minutes    Subjective: Reports having difficulty with the tbands at home because she doesn't always have someone to help her hold the band. Also states her ankle is still sore but she would like to transition to the fitness program and an independent HEP due to a high deductible she has not met yet.       Objective: See treatment diary below      Assessment: Reviewed tband ankle exercises for home and showed patient ways to perform without need for assistance. Reviewed HEP and provided with a thorough written HEP to ensure carryover post d/c. Patient has good understanding of her program and progressions. Patient would like to be discharged at this time, see subjective.       Plan: Patient will be discharged by his primary PT.      Precautions:   POC expires Unit limit Auth Expiration date PT/OT + Visit Limit?   10/16/2024                              Visit/Unit Tracking  AUTH Status:  Date              needed Used 1 2              Remaining                 FOTO 54/73                     Manuals                         MT ankles 10 min NT                                     Neuro Re-Ed                          SLS foam  15\"x5            Biodex balance 5 min NT                                                               Ther Ex             bike 5 min 5'            slant 30\"/4x L3 30\"x4            disc 10x ea 20x ea           Pro stretch 10x NT           Calf press 35/30 35# 30x            Hip abd 35/30 35# 30x            Hip add  35# 30x            Ankle MRE's 2x10 ea Ankle tband RTB 20x ea            HEP(RTB ankle inv/ev) 5 min JR HEP            Ther " Activity                                       Gait Training                                       Modalities                                         Access Code: [email or web address]  URL: [email or web address]  Date: 07/30/2024  Prepared by: Zayra Pond    Exercises  - Single Leg Stance  - 1 x daily - 7 x weekly - 1 sets - 5 reps - 15 hold  - Walking Tandem Stance  - 1 x daily - 7 x weekly - 1 sets - 5 reps  - Sidestepping  - 1 x daily - 7 x weekly - 3 sets - 10 reps  - Standing Heel Raise with Support  - 1 x daily - 7 x weekly - 3 sets - 10 reps  - Seated Ankle Inversion with Resistance and Legs Crossed  - 1 x daily - 7 x weekly - 2 sets - 10 reps  - Long Sitting Ankle Plantar Flexion with Resistance  - 1 x daily - 7 x weekly - 2 sets - 10 reps  - Long Sitting Ankle Eversion with Resistance  - 1 x daily - 7 x weekly - 2 sets - 10 reps  - Long Sitting Ankle Dorsiflexion with Anchored Resistance  - 1 x daily - 7 x weekly - 2 sets - 10 reps  - Standing Gastroc Stretch  - 1 x daily - 7 x weekly - 1 sets - 3 reps - 20 hold

## 2024-08-20 ENCOUNTER — TELEPHONE (OUTPATIENT)
Dept: FAMILY MEDICINE CLINIC | Facility: CLINIC | Age: 44
End: 2024-08-20

## 2024-08-20 DIAGNOSIS — H00.019 HORDEOLUM EXTERNUM, UNSPECIFIED LATERALITY: Primary | ICD-10-CM

## 2024-08-20 RX ORDER — BACITRACIN 500 [USP'U]/G
OINTMENT OPHTHALMIC 3 TIMES DAILY
Qty: 3.5 G | Refills: 0 | Status: SHIPPED | OUTPATIENT
Start: 2024-08-20

## 2024-08-20 NOTE — TELEPHONE ENCOUNTER
Shelley states she has a red lump at her eyelashes (stye). She has had it for over 2 weeks. She's asking for some medicine for it. Theres no openings.   Callpt

## 2024-08-20 NOTE — TELEPHONE ENCOUNTER
There really is no treatment for styes other than warm compresses. I will send an ointment in but typically this is not super helpful.  If it does not resolve would advise eye doctor augusto

## 2024-09-04 ENCOUNTER — OFFICE VISIT (OUTPATIENT)
Dept: FAMILY MEDICINE CLINIC | Facility: CLINIC | Age: 44
End: 2024-09-04
Payer: COMMERCIAL

## 2024-09-04 VITALS
OXYGEN SATURATION: 100 % | DIASTOLIC BLOOD PRESSURE: 96 MMHG | HEIGHT: 60 IN | SYSTOLIC BLOOD PRESSURE: 132 MMHG | BODY MASS INDEX: 29.45 KG/M2 | TEMPERATURE: 98.8 F | WEIGHT: 150 LBS | HEART RATE: 93 BPM

## 2024-09-04 DIAGNOSIS — Z12.31 ENCOUNTER FOR SCREENING MAMMOGRAM FOR BREAST CANCER: ICD-10-CM

## 2024-09-04 DIAGNOSIS — I10 ESSENTIAL HYPERTENSION: ICD-10-CM

## 2024-09-04 DIAGNOSIS — G43.009 MIGRAINE WITHOUT AURA AND WITHOUT STATUS MIGRAINOSUS, NOT INTRACTABLE: Primary | ICD-10-CM

## 2024-09-04 PROCEDURE — 99214 OFFICE O/P EST MOD 30 MIN: CPT | Performed by: PHYSICIAN ASSISTANT

## 2024-09-04 RX ORDER — LOSARTAN POTASSIUM 50 MG/1
50 TABLET ORAL DAILY
Qty: 90 TABLET | Refills: 1 | Status: SHIPPED | OUTPATIENT
Start: 2024-09-04 | End: 2025-03-03

## 2024-09-04 NOTE — PROGRESS NOTES
Ambulatory Visit  Name: Shelley Kyle      : 1980      MRN: [id number]  Encounter Provider: Minh Mobley PA-C  Encounter Date: 2024   Encounter department: [facility]    Assessment & Plan   1. Migraine without aura and without status migrainosus, not intractable  2. Encounter for screening mammogram for breast cancer  -     Mammo screening bilateral w 3d and cad; Future; Expected date: 2024  3. Essential hypertension  -     losartan (COZAAR) 50 mg tablet; Take 1 tablet (50 mg total) by mouth daily     Recommend increase losartan to 50mg. Check labs as previously ordered. Ambulatory BP monitoring for goal <140/90  Migraines controlled with otc medications/supportive measures  Mammogram ordered  Resume routine follow ups     History of Present Illness     Pt presents for follow up. She had a sore throat for which the visit was scheduled but this resolved. She did not have any other sx    HTN: on losartan 25mg, high today and above goal in past recent appts. Previously on hctz but pt stopped. No end organ complaints  Migraine: mostly menstrual, managed with OTC medication  Due for mammography.   Due for labs, these were previously ordered       Review of Systems   Constitutional:  Negative for chills, fatigue and fever.   HENT:  Negative for congestion, ear pain, hearing loss, nosebleeds, postnasal drip, rhinorrhea, sinus pressure, sinus pain, sneezing and sore throat.    Eyes:  Negative for pain, discharge, itching and visual disturbance.   Respiratory:  Negative for cough, chest tightness, shortness of breath and wheezing.    Cardiovascular:  Negative for chest pain, palpitations and leg swelling.   Gastrointestinal:  Negative for abdominal pain, blood in stool, constipation, diarrhea, nausea and vomiting.   Genitourinary:  Negative for frequency and urgency.   Neurological:  Negative for dizziness, light-headedness and numbness.     Past Medical History:   Diagnosis  Date    Cataract     left eye    Hot flashes     Hypertension     Kidney stone     Migraine     menstural migraines    Restless leg syndrome      Past Surgical History:   Procedure Laterality Date    CATARACT EXTRACTION Left     CLOSED REDUCTION NASAL FRACTURE      in her 20s    EYE SURGERY      FL RETROGRADE PYELOGRAM  03/09/2021    [place] CYSTO/URETERO W/LITHOTRIPSY &INDWELL STENT INSRT Left 03/09/2021    Procedure: CYSTOSCOPY URETEROSCOPY WITH LITHOTRIPSY HOLMIUM LASER, RETROGRADE PYELOGRAM AND INSERTION STENT URETERAL;  Surgeon: Anthony Mas MD;  Location: [facility];  Service: Urology    WISDOM TOOTH EXTRACTION Bilateral      Family History   Problem Relation Age of Onset    Hypertension Mother     Thyroid disease Mother     Uterine cancer Mother     Oophorectomy Mother     Hypertension Father     Diabetes Father     No Known Problems Daughter     Diabetes Maternal Grandmother     Alzheimer's disease Maternal Grandmother     Alzheimer's disease Maternal Grandfather     Heart disease Paternal Grandfather     Heart attack Paternal Grandfather     No Known Problems Paternal Aunt      Social History     Tobacco Use    Smoking status: Former    Smokeless tobacco: Never   Vaping Use    Vaping status: Never Used   Substance and Sexual Activity    Alcohol use: Yes     Comment: Social    Drug use: No    Sexual activity: Yes     Partners: Female     Birth control/protection: None     Comment: 1 partner     Current Outpatient Medications on File Prior to Visit   Medication Sig    cholecalciferol 400 units tablet Take 400 Units by mouth daily    EPINEPHrine (EPIPEN) 0.3 mg/0.3 mL SOAJ Inject 0.3 mL (0.3 mg total) into a muscle once for 1 dose    Propylene Glycol (SYSTANE COMPLETE OP) Apply to eye if needed    rOPINIRole (REQUIP) 1 mg tablet Take 2 tabs at 5:30pm and if needed take 1 tab at 8pm.    vitamin E, tocopherol, 400 units capsule     [DISCONTINUED] bacitracin ophthalmic ointment Administer to both eyes 3  (three) times a day    [DISCONTINUED] baclofen 10 mg tablet Take 1 tablet (10 mg total) by mouth 3 (three) times a day for 10 days    [DISCONTINUED] ibuprofen (MOTRIN) 800 mg tablet Take 1 tablet (800 mg total) by mouth 3 (three) times a day for 15 days    [DISCONTINUED] losartan (COZAAR) 25 mg tablet Take 1 tablet (25 mg total) by mouth daily     Allergies   Allergen Reactions    Amoxicillin Edema     Category: Allergy;     Other Sneezing    Wound Dressing Adhesive Rash     Immunization History   Administered Date(s) Administered    COVID-19 PFIZER VACCINE 0.3 ML IM 12/23/2020, 01/14/2021, 10/08/2021    INFLUENZA 10/09/2018, 10/16/2019, 11/05/2021    Influenza, seasonal, injectable 10/01/2015    Tdap 10/18/2014, 10/18/2014     Objective     /96 (BP Location: Left arm, Patient Position: Sitting, Cuff Size: Adult)   Pulse 93   Temp 98.8 °F (37.1 °C)   Ht 5' (1.524 m)   Wt 68 kg (150 lb)   SpO2 100%   BMI 29.29 kg/m²     Physical Exam  Vitals and nursing note reviewed.   Constitutional:       General: She is not in acute distress.     Appearance: She is well-developed.   HENT:      Head: Normocephalic and atraumatic.      Right Ear: Tympanic membrane normal.      Left Ear: Tympanic membrane normal.      Nose: Nose normal.      Mouth/Throat:      Mouth: Mucous membranes are moist.      Pharynx: Oropharynx is clear. No oropharyngeal exudate or posterior oropharyngeal erythema.   Eyes:      Conjunctiva/sclera: Conjunctivae normal.   Cardiovascular:      Rate and Rhythm: Normal rate and regular rhythm.      Heart sounds: No murmur heard.  Pulmonary:      Effort: Pulmonary effort is normal. No respiratory distress.      Breath sounds: Normal breath sounds. No wheezing, rhonchi or rales.   Abdominal:      Palpations: Abdomen is soft.      Tenderness: There is no abdominal tenderness.   Musculoskeletal:         General: No swelling.      Cervical back: Neck supple.   Lymphadenopathy:      Cervical: No cervical  adenopathy.   Skin:     General: Skin is warm and dry.      Capillary Refill: Capillary refill takes less than 2 seconds.   Neurological:      Mental Status: She is alert.   Psychiatric:         Mood and Affect: Mood normal.

## 2024-09-24 DIAGNOSIS — G25.81 RLS (RESTLESS LEGS SYNDROME): ICD-10-CM

## 2024-09-24 RX ORDER — ROPINIROLE 1 MG/1
TABLET, FILM COATED ORAL
Qty: 270 TABLET | Refills: 1 | Status: SHIPPED | OUTPATIENT
Start: 2024-09-24

## 2024-10-14 ENCOUNTER — OFFICE VISIT (OUTPATIENT)
Dept: NEUROLOGY | Facility: CLINIC | Age: 44
End: 2024-10-14
Payer: COMMERCIAL

## 2024-10-14 VITALS
BODY MASS INDEX: 30.23 KG/M2 | HEART RATE: 102 BPM | DIASTOLIC BLOOD PRESSURE: 82 MMHG | WEIGHT: 154 LBS | SYSTOLIC BLOOD PRESSURE: 130 MMHG | HEIGHT: 60 IN

## 2024-10-14 DIAGNOSIS — M54.50 CHRONIC LOWER BACK PAIN: ICD-10-CM

## 2024-10-14 DIAGNOSIS — G89.29 CHRONIC LOWER BACK PAIN: ICD-10-CM

## 2024-10-14 DIAGNOSIS — G25.81 RLS (RESTLESS LEGS SYNDROME): Primary | ICD-10-CM

## 2024-10-14 DIAGNOSIS — M53.3 SI (SACROILIAC) JOINT DYSFUNCTION: ICD-10-CM

## 2024-10-14 DIAGNOSIS — Z86.69 H/O MIGRAINE: ICD-10-CM

## 2024-10-14 PROCEDURE — 99215 OFFICE O/P EST HI 40 MIN: CPT | Performed by: NURSE PRACTITIONER

## 2024-10-14 NOTE — PATIENT INSTRUCTIONS
Can increased ropinirole to 2mg in evening and 2mg at bedtime, update neurology office if this is effective  Referral for Spine and Pain Management to eval for poss etiology of ongoing pain and spasticity.  Follow up on referral with Orthopedics  Continue with PT and ask about dry needling and see if this would be of benefit?  Follow up with Neurology office in 6 months or sooner if needed.
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
## 2024-11-20 ENCOUNTER — OCCMED (OUTPATIENT)
Dept: URGENT CARE | Facility: CLINIC | Age: 44
End: 2024-11-20

## 2024-11-20 ENCOUNTER — APPOINTMENT (OUTPATIENT)
Dept: LAB | Facility: CLINIC | Age: 44
End: 2024-11-20

## 2024-11-20 DIAGNOSIS — Z02.1 PRE-EMPLOYMENT HEALTH SCREENING EXAMINATION: ICD-10-CM

## 2024-11-20 DIAGNOSIS — Z02.1 PRE-EMPLOYMENT HEALTH SCREENING EXAMINATION: Primary | ICD-10-CM

## 2024-11-20 LAB
MEV IGG SER QL IA: NORMAL
MUV IGG SER QL IA: NORMAL
RUBV IGG SERPL IA-ACNC: 223.4 IU/ML
VZV IGG SER QL IA: NORMAL

## 2024-11-20 PROCEDURE — 86480 TB TEST CELL IMMUN MEASURE: CPT

## 2024-11-20 PROCEDURE — 36415 COLL VENOUS BLD VENIPUNCTURE: CPT

## 2024-11-20 PROCEDURE — 86787 VARICELLA-ZOSTER ANTIBODY: CPT

## 2024-11-20 PROCEDURE — 86765 RUBEOLA ANTIBODY: CPT

## 2024-11-20 PROCEDURE — 86762 RUBELLA ANTIBODY: CPT

## 2024-11-20 PROCEDURE — 86735 MUMPS ANTIBODY: CPT

## 2024-11-21 LAB
GAMMA INTERFERON BACKGROUND BLD IA-ACNC: 0.01 IU/ML
M TB IFN-G BLD-IMP: NEGATIVE
M TB IFN-G CD4+ BCKGRND COR BLD-ACNC: -0.01 IU/ML
M TB IFN-G CD4+ BCKGRND COR BLD-ACNC: 0.01 IU/ML
MITOGEN IGNF BCKGRD COR BLD-ACNC: 9.99 IU/ML

## 2025-02-12 DIAGNOSIS — G25.81 RLS (RESTLESS LEGS SYNDROME): ICD-10-CM

## 2025-02-13 ENCOUNTER — DOCUMENTATION (OUTPATIENT)
Age: 45
End: 2025-02-13

## 2025-02-13 DIAGNOSIS — G25.81 RLS (RESTLESS LEGS SYNDROME): Primary | ICD-10-CM

## 2025-02-13 DIAGNOSIS — G25.81 RLS (RESTLESS LEGS SYNDROME): ICD-10-CM

## 2025-02-13 RX ORDER — ROPINIROLE 2 MG/1
4 TABLET, FILM COATED ORAL
Qty: 60 TABLET | Refills: 1 | Status: SHIPPED | OUTPATIENT
Start: 2025-02-13 | End: 2025-02-14 | Stop reason: SDUPTHER

## 2025-02-13 RX ORDER — ROPINIROLE 1 MG/1
TABLET, FILM COATED ORAL
Qty: 270 TABLET | Refills: 1 | Status: SHIPPED | OUTPATIENT
Start: 2025-02-13 | End: 2025-02-14

## 2025-02-14 DIAGNOSIS — G25.81 RLS (RESTLESS LEGS SYNDROME): ICD-10-CM

## 2025-02-14 RX ORDER — ROPINIROLE 1 MG/1
TABLET, FILM COATED ORAL
Qty: 270 TABLET | Refills: 0 | OUTPATIENT
Start: 2025-02-14

## 2025-02-14 RX ORDER — ROPINIROLE 2 MG/1
4 TABLET, FILM COATED ORAL
Qty: 60 TABLET | Refills: 4 | Status: SHIPPED | OUTPATIENT
Start: 2025-02-14

## 2025-02-21 ENCOUNTER — TELEPHONE (OUTPATIENT)
Age: 45
End: 2025-02-21

## 2025-02-21 ENCOUNTER — OFFICE VISIT (OUTPATIENT)
Dept: FAMILY MEDICINE CLINIC | Facility: CLINIC | Age: 45
End: 2025-02-21
Payer: COMMERCIAL

## 2025-02-21 VITALS
DIASTOLIC BLOOD PRESSURE: 80 MMHG | HEIGHT: 60 IN | BODY MASS INDEX: 30.82 KG/M2 | OXYGEN SATURATION: 99 % | TEMPERATURE: 101 F | WEIGHT: 157 LBS | HEART RATE: 97 BPM | SYSTOLIC BLOOD PRESSURE: 116 MMHG

## 2025-02-21 DIAGNOSIS — R50.9 FEVER, UNSPECIFIED FEVER CAUSE: ICD-10-CM

## 2025-02-21 DIAGNOSIS — U07.1 COVID-19: Primary | ICD-10-CM

## 2025-02-21 DIAGNOSIS — U07.1 COVID-19: ICD-10-CM

## 2025-02-21 LAB
SARS-COV-2 AG UPPER RESP QL IA: POSITIVE
SL AMB POCT RAPID FLU A: NEGATIVE
SL AMB POCT RAPID FLU B: NEGATIVE
VALID CONTROL: ABNORMAL

## 2025-02-21 PROCEDURE — 87811 SARS-COV-2 COVID19 W/OPTIC: CPT | Performed by: FAMILY MEDICINE

## 2025-02-21 PROCEDURE — 87804 INFLUENZA ASSAY W/OPTIC: CPT | Performed by: FAMILY MEDICINE

## 2025-02-21 PROCEDURE — 99213 OFFICE O/P EST LOW 20 MIN: CPT | Performed by: FAMILY MEDICINE

## 2025-02-21 RX ORDER — NIRMATRELVIR AND RITONAVIR 300-100 MG
3 KIT ORAL 2 TIMES DAILY
Qty: 30 TABLET | Refills: 0 | Status: SHIPPED | OUTPATIENT
Start: 2025-02-21 | End: 2025-02-26

## 2025-02-21 RX ORDER — NIRMATRELVIR AND RITONAVIR 300-100 MG
3 KIT ORAL 2 TIMES DAILY
Qty: 30 TABLET | Refills: 0 | Status: SHIPPED | OUTPATIENT
Start: 2025-02-21 | End: 2025-02-21 | Stop reason: SDUPTHER

## 2025-02-21 NOTE — PROGRESS NOTES
COVID-19 Outpatient Progress Note  Name: Shelley Kyle      : 1980      MRN: [id number]  Encounter Provider: Wendi Mercedes MD  Encounter Date: 2025   Encounter department: [facility]  :  Assessment & Plan  COVID-19    Orders:  •  nirmatrelvir & ritonavir (Paxlovid, 300/100,) tablet therapy pack; Take 3 tablets by mouth 2 (two) times a day for 5 days Take 2 nirmatrelvir tablets + 1 ritonavir tablet together per dose    Fever, unspecified fever cause    Orders:  •  POCT Rapid Covid Ag  •  POCT rapid flu A and B        Disposition:     Discussed symptom directed medication options with patient.     Patient meets criteria for Paxlovid and they have been counseled appropriately regarding risks, benefits, side effects, and alternative treatment options. After discussion, patient agrees to treatment.    Possible side effects of Paxlovid?    Possible side effects of Paxlovid are:  - Liver Problems. Notify us right away if you start to experience loss of appetite, yellowing of your skin and the whites of eyes (jaundice), dark-colored urine, pale colored stools and itchy skin, stomach area (abdominal) pain.  - Resistance to HIV Medicines. If you have untreated HIV infection, Paxlovid may lead to some HIV medicines not working as well in the future.  - Other possible side effects include: altered sense of taste, diarrhea, high blood pressure, or muscle aches.    I have spent a total time of 10 minutes on the day of the encounter for this patient including risks and benefits of treatment options, instructions for management and impressions.          Recent Visits  No visits were found meeting these conditions.  Showing recent visits within past 7 days and meeting all other requirements  Today's Visits  Date Type Provider Dept   25 Office Visit Wendi Mercedes MD [facility]   Showing today's visits and meeting all other requirements  Future Appointments  No visits  were found meeting these conditions.  Showing future appointments within next 150 days and meeting all other requirements    History of Present Illness     Subjective:   Shelley Kyle is a 44 y.o. female who has been screened for COVID-19. Patient's symptoms include fever, fatigue, cough and myalgias.     - Date of symptom onset: 2/19/2025  - Date of positive COVID-19 test: 2/21/2025. Type of test: Rapid antigen.     COVID-19 vaccination status: Fully vaccinated with booster    She took Theraflu which helped but only briefly.     History of Present Illness    Lab Results   Component Value Date    SARSCOV2 Negative 02/07/2022    SARSCOVAG Positive (A) 02/21/2025       Review of Systems   Constitutional:  Positive for fatigue and fever.   Respiratory:  Positive for cough.    Musculoskeletal:  Positive for myalgias.     Objective   /80   Pulse 97   Temp (!) 101 °F (38.3 °C)   Ht 5' (1.524 m)   Wt 71.2 kg (157 lb)   SpO2 99%   BMI 30.66 kg/m²     Physical Exam    Physical Exam  Vitals and nursing note reviewed.   Constitutional:       General: She is not in acute distress.     Appearance: She is well-developed. She is not diaphoretic.   HENT:      Head: Normocephalic and atraumatic.      Right Ear: Tympanic membrane, ear canal and external ear normal.      Left Ear: Tympanic membrane, ear canal and external ear normal.      Mouth/Throat:      Mouth: Mucous membranes are moist.      Pharynx: Oropharynx is clear.   Eyes:      Conjunctiva/sclera: Conjunctivae normal.   Cardiovascular:      Rate and Rhythm: Normal rate and regular rhythm.      Heart sounds: Normal heart sounds. No murmur heard.     No friction rub. No gallop.   Pulmonary:      Effort: Pulmonary effort is normal. No respiratory distress.      Breath sounds: Normal breath sounds. No stridor. No wheezing or rales.   Musculoskeletal:      Right lower leg: No edema.      Left lower leg: No edema.   Neurological:      General: No focal deficit  present.      Mental Status: She is alert.   Psychiatric:         Mood and Affect: Mood normal.         Behavior: Behavior normal.         Thought Content: Thought content normal.         Judgment: Judgment normal.

## 2025-02-21 NOTE — LETTER
February 21, 2025     Patient: Shelely Kyle  YOB: 1980  Date of Visit: 2/21/2025      To Whom it May Concern:    Shelley Kyle is under my professional care. Shelley was seen in my office on 2/21/2025. Please excuse her for 2/20/25 and 2/21/25. Shelley may return to work on 2/24/25 .    If you have any questions or concerns, please don't hesitate to call.         Sincerely,          Wendi Mercedes MD        CC: No Recipients

## 2025-02-21 NOTE — TELEPHONE ENCOUNTER
Patients Paxlovid medication went to the wrong pharmacy .  Should be sent to [facility], please resent patient is at the [facility] [place] [facility] pharmacy  waiting

## 2025-02-24 ENCOUNTER — PATIENT MESSAGE (OUTPATIENT)
Dept: FAMILY MEDICINE CLINIC | Facility: CLINIC | Age: 45
End: 2025-02-24

## 2025-03-06 DIAGNOSIS — I10 ESSENTIAL HYPERTENSION: ICD-10-CM

## 2025-03-07 DIAGNOSIS — I10 ESSENTIAL HYPERTENSION: Primary | ICD-10-CM

## 2025-03-07 RX ORDER — LOSARTAN POTASSIUM 50 MG/1
50 TABLET ORAL DAILY
Qty: 90 TABLET | Refills: 1 | Status: SHIPPED | OUTPATIENT
Start: 2025-03-07

## 2025-05-02 DIAGNOSIS — Z91.030 BEE STING ALLERGY: ICD-10-CM

## 2025-05-02 RX ORDER — EPINEPHRINE 0.3 MG/.3ML
0.3 INJECTION SUBCUTANEOUS ONCE
Qty: 0.6 ML | Refills: 1 | Status: SHIPPED | OUTPATIENT
Start: 2025-05-02 | End: 2025-05-02

## 2025-05-08 ENCOUNTER — OFFICE VISIT (OUTPATIENT)
Dept: FAMILY MEDICINE CLINIC | Facility: CLINIC | Age: 45
End: 2025-05-08
Payer: COMMERCIAL

## 2025-05-08 ENCOUNTER — APPOINTMENT (OUTPATIENT)
Dept: LAB | Facility: CLINIC | Age: 45
End: 2025-05-08
Payer: COMMERCIAL

## 2025-05-08 VITALS
HEIGHT: 60 IN | BODY MASS INDEX: 29.84 KG/M2 | DIASTOLIC BLOOD PRESSURE: 90 MMHG | TEMPERATURE: 97.5 F | HEART RATE: 84 BPM | SYSTOLIC BLOOD PRESSURE: 138 MMHG | WEIGHT: 152 LBS | OXYGEN SATURATION: 97 %

## 2025-05-08 DIAGNOSIS — Z13.1 SCREENING FOR DIABETES MELLITUS: ICD-10-CM

## 2025-05-08 DIAGNOSIS — Z11.59 NEED FOR HEPATITIS C SCREENING TEST: ICD-10-CM

## 2025-05-08 DIAGNOSIS — N92.6 MENSTRUAL IRREGULARITY: ICD-10-CM

## 2025-05-08 DIAGNOSIS — Z00.00 ANNUAL PHYSICAL EXAM: ICD-10-CM

## 2025-05-08 DIAGNOSIS — R23.2 HOT FLASHES: ICD-10-CM

## 2025-05-08 DIAGNOSIS — Z11.4 SCREENING FOR HIV (HUMAN IMMUNODEFICIENCY VIRUS): ICD-10-CM

## 2025-05-08 DIAGNOSIS — Z13.220 LIPID SCREENING: ICD-10-CM

## 2025-05-08 DIAGNOSIS — R10.9 RIGHT SIDED ABDOMINAL PAIN: ICD-10-CM

## 2025-05-08 DIAGNOSIS — R10.9 RIGHT SIDED ABDOMINAL PAIN: Primary | ICD-10-CM

## 2025-05-08 LAB
ALBUMIN SERPL BCG-MCNC: 4.7 G/DL (ref 3.5–5)
ALP SERPL-CCNC: 68 U/L (ref 34–104)
ALT SERPL W P-5'-P-CCNC: 27 U/L (ref 7–52)
ANION GAP SERPL CALCULATED.3IONS-SCNC: 9 MMOL/L (ref 4–13)
AST SERPL W P-5'-P-CCNC: 22 U/L (ref 13–39)
BASOPHILS # BLD AUTO: 0.02 THOUSANDS/ÂΜL (ref 0–0.1)
BASOPHILS NFR BLD AUTO: 0 % (ref 0–1)
BILIRUB SERPL-MCNC: 0.71 MG/DL (ref 0.2–1)
BUN SERPL-MCNC: 17 MG/DL (ref 5–25)
CALCIUM SERPL-MCNC: 9.6 MG/DL (ref 8.4–10.2)
CHLORIDE SERPL-SCNC: 103 MMOL/L (ref 96–108)
CHOLEST SERPL-MCNC: 188 MG/DL (ref ?–200)
CO2 SERPL-SCNC: 27 MMOL/L (ref 21–32)
CREAT SERPL-MCNC: 0.72 MG/DL (ref 0.6–1.3)
EOSINOPHIL # BLD AUTO: 0.07 THOUSAND/ÂΜL (ref 0–0.61)
EOSINOPHIL NFR BLD AUTO: 1 % (ref 0–6)
ERYTHROCYTE [DISTWIDTH] IN BLOOD BY AUTOMATED COUNT: 11.9 % (ref 11.6–15.1)
EST. AVERAGE GLUCOSE BLD GHB EST-MCNC: 108 MG/DL
FSH SERPL-ACNC: 4.4 MIU/ML
GFR SERPL CREATININE-BSD FRML MDRD: 103 ML/MIN/1.73SQ M
GLUCOSE P FAST SERPL-MCNC: 96 MG/DL (ref 65–99)
HBA1C MFR BLD: 5.4 %
HCT VFR BLD AUTO: 45.5 % (ref 34.8–46.1)
HDLC SERPL-MCNC: 36 MG/DL
HGB BLD-MCNC: 14.5 G/DL (ref 11.5–15.4)
IMM GRANULOCYTES # BLD AUTO: 0.02 THOUSAND/UL (ref 0–0.2)
IMM GRANULOCYTES NFR BLD AUTO: 0 % (ref 0–2)
LDLC SERPL CALC-MCNC: 112 MG/DL (ref 0–100)
LH SERPL-ACNC: 5.2 MIU/ML
LYMPHOCYTES # BLD AUTO: 2.04 THOUSANDS/ÂΜL (ref 0.6–4.47)
LYMPHOCYTES NFR BLD AUTO: 28 % (ref 14–44)
MCH RBC QN AUTO: 28.5 PG (ref 26.8–34.3)
MCHC RBC AUTO-ENTMCNC: 31.9 G/DL (ref 31.4–37.4)
MCV RBC AUTO: 89 FL (ref 82–98)
MONOCYTES # BLD AUTO: 0.51 THOUSAND/ÂΜL (ref 0.17–1.22)
MONOCYTES NFR BLD AUTO: 7 % (ref 4–12)
NEUTROPHILS # BLD AUTO: 4.69 THOUSANDS/ÂΜL (ref 1.85–7.62)
NEUTS SEG NFR BLD AUTO: 64 % (ref 43–75)
NRBC BLD AUTO-RTO: 0 /100 WBCS
PLATELET # BLD AUTO: 351 THOUSANDS/UL (ref 149–390)
PMV BLD AUTO: 12.2 FL (ref 8.9–12.7)
POTASSIUM SERPL-SCNC: 4 MMOL/L (ref 3.5–5.3)
PROT SERPL-MCNC: 7.5 G/DL (ref 6.4–8.4)
RBC # BLD AUTO: 5.09 MILLION/UL (ref 3.81–5.12)
SL AMB  POCT GLUCOSE, UA: NORMAL
SL AMB LEUKOCYTE ESTERASE,UA: NORMAL
SL AMB POCT BILIRUBIN,UA: NORMAL
SL AMB POCT BLOOD,UA: NORMAL
SL AMB POCT KETONES,UA: NORMAL
SL AMB POCT NITRITE,UA: NORMAL
SL AMB POCT PH,UA: 6
SL AMB POCT SPECIFIC GRAVITY,UA: 1.02
SL AMB POCT URINE PROTEIN: NORMAL
SL AMB POCT UROBILINOGEN: 0.2
SODIUM SERPL-SCNC: 139 MMOL/L (ref 135–147)
TRIGL SERPL-MCNC: 200 MG/DL (ref ?–150)
TSH SERPL DL<=0.05 MIU/L-ACNC: 1.4 UIU/ML (ref 0.45–4.5)
WBC # BLD AUTO: 7.35 THOUSAND/UL (ref 4.31–10.16)

## 2025-05-08 PROCEDURE — 84443 ASSAY THYROID STIM HORMONE: CPT

## 2025-05-08 PROCEDURE — 81003 URINALYSIS AUTO W/O SCOPE: CPT | Performed by: FAMILY MEDICINE

## 2025-05-08 PROCEDURE — 85025 COMPLETE CBC W/AUTO DIFF WBC: CPT

## 2025-05-08 PROCEDURE — 83002 ASSAY OF GONADOTROPIN (LH): CPT

## 2025-05-08 PROCEDURE — 87389 HIV-1 AG W/HIV-1&-2 AB AG IA: CPT

## 2025-05-08 PROCEDURE — 86803 HEPATITIS C AB TEST: CPT

## 2025-05-08 PROCEDURE — 99214 OFFICE O/P EST MOD 30 MIN: CPT | Performed by: FAMILY MEDICINE

## 2025-05-08 PROCEDURE — 83036 HEMOGLOBIN GLYCOSYLATED A1C: CPT

## 2025-05-08 PROCEDURE — 80053 COMPREHEN METABOLIC PANEL: CPT

## 2025-05-08 PROCEDURE — 80061 LIPID PANEL: CPT

## 2025-05-08 PROCEDURE — 99396 PREV VISIT EST AGE 40-64: CPT | Performed by: FAMILY MEDICINE

## 2025-05-08 PROCEDURE — 83001 ASSAY OF GONADOTROPIN (FSH): CPT

## 2025-05-08 PROCEDURE — 36415 COLL VENOUS BLD VENIPUNCTURE: CPT

## 2025-05-08 NOTE — PROGRESS NOTES
Adult Annual Physical  Name: Shelley Kyle      : 1980      MRN: [id number]  Encounter Provider: Wendi Mercedes MD  Encounter Date: 2025   Encounter department: [facility]    :  Assessment & Plan  Right sided abdominal pain  Check labs, imaging   Urine is clear  Orders:  •  Comprehensive metabolic panel; Future  •  POCT urine dip auto non-scope  •  CT abdomen pelvis w contrast; Future    Menstrual irregularity  Check labs  Follow-up with Gyn   Orders:  •  Follicle stimulating hormone; Future  •  Luteinizing hormone; Future    Hot flashes  Check labs  Orders:  •  CBC and differential; Future  •  Comprehensive metabolic panel; Future  •  TSH, 3rd generation with Free T4 reflex; Future  •  Follicle stimulating hormone; Future  •  Luteinizing hormone; Future    Annual physical exam         Lipid screening  Update labs  Orders:  •  Lipid Panel with Direct LDL reflex; Future    Screening for diabetes mellitus  Update labs  Orders:  •  Hemoglobin A1C; Future    Screening for HIV (human immunodeficiency virus)  Patient was counseled and accepted HIV screening.    Orders:  •  HIV 1/2 AG/AB w Reflex [facility] for 2 yr old and above; Future    Need for hepatitis C screening test  Check Hep C ab. Patient agreeable.   Orders:  •  Hepatitis C antibody; Future        Preventive Screenings:  - Diabetes Screening: risks/benefits discussed and orders placed  - Cholesterol Screening: risks/benefits discussed and orders placed   - Hepatitis C screening: risks/benefits discussed and orders placed   - HIV screening: risks/benefits discussed and orders placed   - Cervical cancer screening: screening up-to-date and risks/benefits discussed   - Breast cancer screening: risks/benefits discussed and orders placed   - Colon cancer screening: screening not indicated   - Lung cancer screening: screening not indicated     Immunizations:  - Immunizations due: Tdap    Counseling/Anticipatory Guidance:    -  Diet: discussed recommendations for a healthy/well-balanced diet.   - Exercise: the importance of regular exercise/physical activity was discussed. Recommend exercise 3-5 times per week for at least 30 minutes.          History of Present Illness     Adult Annual Physical:  Patient presents for annual physical. 44 year old F here for intermittent R sided abdominal discomfort that comes and goes for the past 2-3 months.  She says it started after she took Paxlovid for COVID in Feb. No identifiable triggers. No food triggers.  Having some hot flashes and her menstrual cycles are getting longer.   She does have a h/o kidney stones. .     Diet and Physical Activity:  - Diet/Nutrition: well balanced diet.  - Exercise: walking.    General Health:  - Sleep: 7-8 hours of sleep on average.  - Hearing: normal hearing bilateral ears.  - Vision: vision problems and wears contacts.  - Dental: no dental visits for > 1 year, brushes teeth once daily and floss regularly.    /GYN Health:  - Follows with GYN: yes.   - Menopause: premenopausal.     Review of Systems   Constitutional:  Negative for appetite change.   HENT: Negative.     Eyes: Negative.    Respiratory: Negative.     Cardiovascular: Negative.    Gastrointestinal:  Positive for abdominal pain. Negative for nausea and vomiting.   Endocrine: Positive for heat intolerance.   Genitourinary:  Positive for menstrual problem. Negative for decreased urine volume, dysuria, hematuria, urgency, vaginal bleeding, vaginal discharge and vaginal pain.   Allergic/Immunologic: Negative.    Neurological: Negative.    Hematological: Negative.    Psychiatric/Behavioral: Negative.           Objective   /90   Pulse 84   Temp 97.5 °F (36.4 °C)   Ht 5' (1.524 m)   Wt 68.9 kg (152 lb)   SpO2 97%   BMI 29.69 kg/m²     Physical Exam  Constitutional:       General: She is not in acute distress.     Appearance: Normal appearance. She is well-developed. She is not ill-appearing,  toxic-appearing or diaphoretic.   HENT:      Head: Normocephalic and atraumatic.      Right Ear: Tympanic membrane, ear canal and external ear normal.      Left Ear: External ear normal. There is impacted cerumen.      Mouth/Throat:      Mouth: Mucous membranes are moist.      Pharynx: Oropharynx is clear. No oropharyngeal exudate.   Eyes:      General: No scleral icterus.        Right eye: No discharge.         Left eye: No discharge.      Conjunctiva/sclera: Conjunctivae normal.      Pupils: Pupils are equal, round, and reactive to light.   Neck:      Thyroid: No thyromegaly.   Cardiovascular:      Rate and Rhythm: Normal rate and regular rhythm.      Heart sounds: Normal heart sounds. No murmur heard.     No friction rub. No gallop.   Pulmonary:      Effort: Pulmonary effort is normal. No respiratory distress.      Breath sounds: Normal breath sounds. No wheezing or rales.   Chest:      Chest wall: No tenderness.   Abdominal:      General: Bowel sounds are normal. There is no distension.      Palpations: Abdomen is soft. There is no mass.      Tenderness: There is no abdominal tenderness. There is no guarding or rebound.      Hernia: No hernia is present.   Skin:     General: Skin is warm.      Findings: No rash.   Neurological:      General: No focal deficit present.      Mental Status: She is alert. Mental status is at baseline.      Cranial Nerves: No cranial nerve deficit.   Psychiatric:         Mood and Affect: Mood normal.         Behavior: Behavior normal.         Thought Content: Thought content normal.         Judgment: Judgment normal.

## 2025-05-08 NOTE — PATIENT INSTRUCTIONS
"Patient Education     Routine physical for adults   The Basics   Written by the doctors and editors at [facility]   What is a physical? -- A physical is a routine visit, or \"check-up,\" with your doctor. You might also hear it called a \"wellness visit\" or \"preventive visit.\"  During each visit, the doctor will:   Ask about your physical and mental health   Ask about your habits, behaviors, and lifestyle   Do an exam   Give you vaccines if needed   Talk to you about any medicines you take   Give advice about your health   Answer your questions  Getting regular check-ups is an important part of taking care of your health. It can help your doctor find and treat any problems you have. But it's also important for preventing health problems.  A routine physical is different from a \"sick visit.\" A sick visit is when you see a doctor because of a health concern or problem. Since physicals are scheduled ahead of time, you can think about what you want to ask the doctor.  How often should I get a physical? -- It depends on your age and health. In general, for people age 21 years and older:   If you are younger than 50 years, you might be able to get a physical every 3 years.   If you are 50 years or older, your doctor might recommend a physical every year.  If you have an ongoing health condition, like diabetes or high blood pressure, your doctor will probably want to see you more often.  What happens during a physical? -- In general, each visit will include:   Physical exam - The doctor or nurse will check your height, weight, heart rate, and blood pressure. They will also look at your eyes and ears. They will ask about how you are feeling and whether you have any symptoms that bother you.   Medicines - It's a good idea to bring a list of all the medicines you take to each doctor visit. Your doctor will talk to you about your medicines and answer any questions. Tell them if you are having any side effects that bother you. You " "should also tell them if you are having trouble paying for any of your medicines.   Habits and behaviors - This includes:   Your diet   Your exercise habits   Whether you smoke, drink alcohol, or use drugs   Whether you are sexually active   Whether you feel safe at home  Your doctor will talk to you about things you can do to improve your health and lower your risk of health problems. They will also offer help and support. For example, if you want to quit smoking, they can give you advice and might prescribe medicines. If you want to improve your diet or get more physical activity, they can help you with this, too.   Lab tests, if needed - The tests you get will depend on your age and situation. For example, your doctor might want to check your:   Cholesterol   Blood sugar   Iron level   Vaccines - The recommended vaccines will depend on your age, health, and what vaccines you already had. Vaccines are very important because they can prevent certain serious or deadly infections.   Discussion of screening - \"Screening\" means checking for diseases or other health problems before they cause symptoms. Your doctor can recommend screening based on your age, risk, and preferences. This might include tests to check for:   Cancer, such as breast, prostate, cervical, ovarian, colorectal, prostate, lung, or skin cancer   Sexually transmitted infections, such as chlamydia and gonorrhea   Mental health conditions like depression and anxiety  Your doctor will talk to you about the different types of screening tests. They can help you decide which screenings to have. They can also explain what the results might mean.   Answering questions - The physical is a good time to ask the doctor or nurse questions about your health. If needed, they can refer you to other doctors or specialists, too.  Adults older than 65 years often need other care, too. As you get older, your doctor will talk to you about:   How to prevent falling at " home   Hearing or vision tests   Memory testing   How to take your medicines safely   Making sure that you have the help and support you need at home  All topics are updated as new evidence becomes available and our peer review process is complete.  This topic retrieved from [employer] on: May 02, 2024.  Topic 904940 Version 1.0  Release: 32.4.3 - C32.122  © 2024 UpToDate, Inc. and/or its affiliates. All rights reserved.  Consumer Information Use and Disclaimer   Disclaimer: This generalized information is a limited summary of diagnosis, treatment, and/or medication information. It is not meant to be comprehensive and should be used as a tool to help the user understand and/or assess potential diagnostic and treatment options. It does NOT include all information about conditions, treatments, medications, side effects, or risks that may apply to a specific patient. It is not intended to be medical advice or a substitute for the medical advice, diagnosis, or treatment of a health care provider based on the health care provider's examination and assessment of a patient's specific and unique circumstances. Patients must speak with a health care provider for complete information about their health, medical questions, and treatment options, including any risks or benefits regarding use of medications. This information does not endorse any treatments or medications as safe, effective, or approved for treating a specific patient. UpToDate, Inc. and its affiliates disclaim any warranty or liability relating to this information or the use thereof.The use of this information is governed by the Terms of Use, available at https://www.woltersClickabilityuwer.com/en/know/clinical-effectiveness-terms. 2024© UpToDate, Inc. and its affiliates and/or licensors. All rights reserved.  Copyright   © 2024 UpToDate, Inc. and/or its affiliates. All rights reserved.

## 2025-05-09 ENCOUNTER — RESULTS FOLLOW-UP (OUTPATIENT)
Dept: FAMILY MEDICINE CLINIC | Facility: CLINIC | Age: 45
End: 2025-05-09

## 2025-05-09 DIAGNOSIS — E78.1 HIGH TRIGLYCERIDES: ICD-10-CM

## 2025-05-09 DIAGNOSIS — M25.571 BILATERAL ANKLE PAIN, UNSPECIFIED CHRONICITY: Primary | ICD-10-CM

## 2025-05-09 DIAGNOSIS — E66.3 OVERWEIGHT (BMI 25.0-29.9): ICD-10-CM

## 2025-05-09 DIAGNOSIS — M25.572 BILATERAL ANKLE PAIN, UNSPECIFIED CHRONICITY: Primary | ICD-10-CM

## 2025-05-09 LAB
HCV AB SER QL: NORMAL
HIV 1+2 AB+HIV1 P24 AG SERPL QL IA: NORMAL

## 2025-05-21 ENCOUNTER — HOSPITAL ENCOUNTER (OUTPATIENT)
Dept: RADIOLOGY | Facility: HOSPITAL | Age: 45
Discharge: HOME | End: 2025-05-21
Attending: FAMILY MEDICINE
Payer: COMMERCIAL

## 2025-05-21 DIAGNOSIS — R10.9 RIGHT SIDED ABDOMINAL PAIN: ICD-10-CM

## 2025-05-21 PROCEDURE — 74177 CT ABD & PELVIS W/CONTRAST: CPT

## 2025-05-21 RX ADMIN — IOHEXOL 100 ML: 350 INJECTION, SOLUTION INTRAVENOUS at 13:14

## 2025-05-29 DIAGNOSIS — Z91.030 BEE STING ALLERGY: ICD-10-CM

## 2025-05-29 RX ORDER — EPINEPHRINE 0.3 MG/.3ML
0.3 INJECTION SUBCUTANEOUS ONCE
Qty: 0.6 ML | Refills: 0 | Status: SHIPPED | OUTPATIENT
Start: 2025-05-29 | End: 2025-05-29

## 2025-05-30 ENCOUNTER — NURSE TRIAGE (OUTPATIENT)
Age: 45
End: 2025-05-30

## 2025-05-30 DIAGNOSIS — K21.9 GASTROESOPHAGEAL REFLUX DISEASE, UNSPECIFIED WHETHER ESOPHAGITIS PRESENT: ICD-10-CM

## 2025-05-30 DIAGNOSIS — K21.9 GASTROESOPHAGEAL REFLUX DISEASE, UNSPECIFIED WHETHER ESOPHAGITIS PRESENT: Primary | ICD-10-CM

## 2025-05-30 RX ORDER — OMEPRAZOLE 20 MG/1
20 CAPSULE, DELAYED RELEASE ORAL DAILY
Qty: 90 CAPSULE | Refills: 1 | Status: SHIPPED | OUTPATIENT
Start: 2025-05-30 | End: 2025-05-30 | Stop reason: SDUPTHER

## 2025-05-30 RX ORDER — OMEPRAZOLE 20 MG/1
20 CAPSULE, DELAYED RELEASE ORAL DAILY
Qty: 90 CAPSULE | Refills: 1 | Status: SHIPPED | OUTPATIENT
Start: 2025-05-30

## 2025-05-30 NOTE — TELEPHONE ENCOUNTER
Duplicate request.  Medication already reordered by another nurse.  Left message on pt's VM asking her to call back to confirm that she was able to  medication.      Reason for Disposition   Negative: Did you page the on call provider?    Protocols used: WADE NO TRIAGE REQUIRED

## 2025-05-30 NOTE — TELEPHONE ENCOUNTER
Regarding: Med Reorder  ----- Message from Chel PETERSEN sent at 5/30/2025  3:08 PM EDT -----  MyChart: Message sent to patient to verify that her [facility] is still open ##    Shelley Kyle to [facility] (supporting Wendi Mercedes MD) (Selected Message)        5/30/25  2:25 PM  Hi Dr. Mercedes,     The Omeprazole went to [facility]. I can't get there to pick it up before vacation, can you please have office call and cancel the script at [facility] and send to [facility] in [place]? Thanks!

## 2025-05-30 NOTE — TELEPHONE ENCOUNTER
"Reason for Disposition   Prescription not at pharmacy and was prescribed by PCP recently  (Exception: triager has access to EMR and prescription is recorded there. Go to Home Care and confirm for pharmacy.)    Answer Assessment - Initial Assessment Questions  1. NAME of MEDICINE: \"What medicine(s) are you calling about?\"      omeprazole (PriLOSEC) 20 mg delayed release capsule  2. QUESTION: \"What is your question?\" (e.g., double dose of medicine, side effect)      New pharmacy  3. PRESCRIBER: \"Who prescribed the medicine?\" Reason: if prescribed by specialist, call should be referred to that group.      Dr Mercedes    Protocols used: Medication Question Call-Adult-[place]    "

## 2025-06-09 ENCOUNTER — OFFICE VISIT (OUTPATIENT)
Dept: BARIATRICS | Facility: CLINIC | Age: 45
End: 2025-06-09
Payer: COMMERCIAL

## 2025-06-09 VITALS
HEART RATE: 76 BPM | WEIGHT: 156 LBS | DIASTOLIC BLOOD PRESSURE: 64 MMHG | BODY MASS INDEX: 30.63 KG/M2 | OXYGEN SATURATION: 98 % | HEIGHT: 60 IN | SYSTOLIC BLOOD PRESSURE: 110 MMHG | RESPIRATION RATE: 14 BRPM

## 2025-06-09 DIAGNOSIS — E66.811 OBESITY, CLASS I, BMI 30-34.9: Primary | ICD-10-CM

## 2025-06-09 DIAGNOSIS — G47.30 SLEEP APNEA: ICD-10-CM

## 2025-06-09 DIAGNOSIS — I10 ESSENTIAL HYPERTENSION: ICD-10-CM

## 2025-06-09 PROCEDURE — 99203 OFFICE O/P NEW LOW 30 MIN: CPT

## 2025-06-09 NOTE — PROGRESS NOTES
Assessment/Plan:    Obesity, Class I, BMI 30-34.9  - Discussed options of HealthyCORE-Intensive Lifestyle Intervention Program, Very Low Calorie Diet-VLCD, and Conservative Program and the role of weight loss medications.  - Patient is interested in pursuing Conservative Program and follow up visits with medical weight management provider.  - Explained the importance of making lifestyle changes in addition to starting any anti-obesity medications.   - Initial weight loss goal of 5-10% weight loss for improved health. Weight loss can improve patient's co-morbid conditions and/or prevent weight-related complications.  - Weight is not at goal and patient has been unable to achieve a meaningful weight loss above 5% using various programs and tools for more than 6 months  - Labs reviewed from 5/2025    General Recommendations:  Nutrition:  Eat breakfast daily.  Do not skip meals.      Food log (ie.) www.judge.me.com, sparkpeople.com, loseit.com, Story[employer].com, etc.     Practice mindful eating.  Be sure to set aside time to eat, eat slowly, and savor your food.     Hydration:    At least 64oz of water daily.  No sugar sweetened beverages.  No juice (eat the fruit instead).     Exercise:  Studies have shown that the ideal exercise goal is somewhere between 150 to 300 minutes of moderate intensity exercise a week.  Start with exercising 10 minutes every other day and gradually increase physical activity with a goal of at least 150 minutes of moderate intensity exercise a week, divided over at least 3 days a week.  An example of this would be exercising 30 minutes a day, 5 days a week.  Resistance training can increase muscle mass and increase our resting metabolic rate.   FULL BODY resistance training is recommended 2-3 times a week.  Do not do this on consecutive days to allow for muscle recovery.     Aim for a bare minimum 5000 steps, even on days you do not exercise.     Monitoring:   Weigh yourself daily.  If this  causes undue stress, then just weigh yourself once a week.  Weigh yourself the same time of the day with the same amount of clothing on.  Preferably this should be done after waking up, before you eat, and with no clothing or minimal clothing on.     Specific Goals:  Calorie goal:  1786-0062 saumya/day (Provided with meal plan to follow)   Discussed her nutrition and lifestyle and she is encouraged to track her food.  Increase her protein and fiber intake. Discussed protein rich snacks and shakes she can try.   Discussed the importance of increasing her physical activity and strength training as she is able.   Discussed medications and would avoid phentermine due to her HTN. She is not interested in medication at this time and wants to participate in the conservative program and see the dietician.          Shelley was seen today for consult.    Diagnoses and all orders for this visit:    Obesity, Class I, BMI 30-34.9    Sleep apnea    Essential hypertension    Other orders  -     Ambulatory Referral to Weight Management           Total time spent reviewing chart, interviewing patient, examining patient, discussing plan, answering all questions, and documentin min, with >50% face-to-face time spent counseling patient on nonsurgical interventions for the treatment of excess weight. Discussed in detail nonsurgical options including intensive lifestyle intervention program, very low-calorie diet program and conservative program.  Discussed the role of weight loss medications.  Counseled patient on diet behavior and exercise modification for weight loss.    Follow up in approximately 3 months with Non-Surgical Physician/Advanced Practitioner.    Subjective:   Chief Complaint   Patient presents with    Consult       Patient ID: Shelley Kyle  is a 44 y.o. female with excess weight/obesity here to pursue weight management.  Previous notes and records have been reviewed.    Past Medical History[1]  Past Surgical  History[2]    HPI:  Wt Readings from Last 20 Encounters:   06/09/25 70.8 kg (156 lb)   05/08/25 68.9 kg (152 lb)   02/21/25 71.2 kg (157 lb)   10/14/24 69.9 kg (154 lb)   09/04/24 68 kg (150 lb)   06/26/24 62.6 kg (138 lb)   03/04/24 60.3 kg (133 lb)   02/07/24 63.6 kg (140 lb 3.2 oz)   11/13/23 65.8 kg (145 lb)   09/15/23 62.6 kg (138 lb)   05/05/23 62.6 kg (138 lb)   04/10/23 61.1 kg (134 lb 9.6 oz)   12/28/22 61.7 kg (136 lb)   12/22/22 61.7 kg (136 lb)   04/20/22 60.3 kg (133 lb)   04/14/22 60.3 kg (133 lb)   10/05/21 62.2 kg (137 lb 3.2 oz)   05/17/21 60.3 kg (133 lb)   03/29/21 60.8 kg (134 lb)   03/17/21 59 kg (130 lb)       Patient presents today to medical weight management office for consult. She has struggled with getting weight off since about her mid 30s. She has KEVIN and has RLS so her sleep at night is not great, she does wear a mask for her KEVIN.   She is interested in guidance for a healthier diet.       Starting MWM weight: 156 lbs   Starting BMI: 30.2  Goal weight: 130s    Obesity/Excess Weight:  Severity: Mild  Onset:  Since being in her late 30s     Modifiers: Diet and Exercise and Commercial Weight Loss Programs-ie. Weight Watchers, [employer], Nutrisystem, etc.  Contributing factors: Poor Food Choices, Insufficient Physical Activity, Stress/Emotional Eating, Lack of knowledge of appropriate lifestyle changes, and Insufficient time to make appropriate lifestyle changes  Associated symptoms: comorbid conditions, fatigue, decreased exercise capacity, body image issues, decreased self esteem, decreased mobility, depression, inability to do certain activities, and clothes do not fit    Diet recall:  B: Coffee and fruit with yogurt and granola   S: no  L: Uses cafe at work for salads or sandwiches   S: no  D: Protein, veggies, starches   S: sweets or salty   Take out frequency: rarely     Hydration: Water 70 -90 oz of water a day , 20 oz coffee with creamer   Alcohol: no  Smoking: no  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    Family History[3]     Review of Systems   Constitutional:  Negative for fatigue.   HENT:  Negative for sore throat.    Respiratory:  Negative for cough and shortness of breath.    Cardiovascular:  Negative for chest pain, palpitations and leg swelling.   Gastrointestinal:  Negative for abdominal pain, constipation, diarrhea, nausea and vomiting.   Genitourinary:  Negative for dysuria.   Musculoskeletal:  Negative for arthralgias and back pain.   Skin:  Negative for rash.   Neurological:  Negative for headaches.   Psychiatric/Behavioral:  Negative for dysphoric mood. The patient is not nervous/anxious.        Objective:  /64 (BP Location: Left arm, Patient Position: Sitting, Cuff Size: Standard)   Pulse 76   Resp 14   Ht 5' 0.24\" (1.53 m)   Wt 70.8 kg (156 lb)   SpO2 98%   BMI 30.23 kg/m²     Physical Exam  Vitals and nursing note reviewed.   Constitutional:       Appearance: Normal appearance. She is obese.   HENT:      Head: Normocephalic.   Pulmonary:      Effort: Pulmonary effort is normal.     Neurological:      Mental Status: She is oriented to person, place, and time.     Psychiatric:         Mood and Affect: Mood normal.         Behavior: Behavior normal.         Thought Content: Thought content normal.         Judgment: Judgment normal.              Labs and Imaging  Recent labs and imaging have been personally reviewed.  Lab Results   Component Value Date    WBC 7.35 05/08/2025    HGB 14.5 05/08/2025    HCT 45.5 05/08/2025    MCV 89 05/08/2025     " 05/08/2025     Lab Results   Component Value Date    SODIUM 139 05/08/2025    K 4.0 05/08/2025     05/08/2025    CO2 27 05/08/2025    AGAP 9 05/08/2025    BUN 17 05/08/2025    CREATININE 0.72 05/08/2025    GLUC 92 03/08/2021    GLUF 96 05/08/2025    CALCIUM 9.6 05/08/2025    AST 22 05/08/2025    ALT 27 05/08/2025    ALKPHOS 68 05/08/2025    TP 7.5 05/08/2025    TBILI 0.71 05/08/2025    EGFR 103 05/08/2025     Lab Results   Component Value Date    HGBA1C 5.4 05/08/2025     Lab Results   Component Value Date    YWL4QUENVWAP 1.397 05/08/2025     Lab Results   Component Value Date    CHOLESTEROL 188 05/08/2025     Lab Results   Component Value Date    HDL 36 (L) 05/08/2025     Lab Results   Component Value Date    TRIG 200 (H) 05/08/2025     Lab Results   Component Value Date    LDLCALC 112 (H) 05/08/2025          [1]   Past Medical History:  Diagnosis Date    Cataract     left eye    Hot flashes     Hypertension     Kidney stone     Migraine     menstural migraines    Restless leg syndrome    [2]   Past Surgical History:  Procedure Laterality Date    CATARACT EXTRACTION Left     CLOSED REDUCTION NASAL FRACTURE      in her 20s    EYE SURGERY      FL RETROGRADE PYELOGRAM  03/09/2021    [place] CYSTO/URETERO W/LITHOTRIPSY &INDWELL STENT INSRT Left 03/09/2021    Procedure: CYSTOSCOPY URETEROSCOPY WITH LITHOTRIPSY HOLMIUM LASER, RETROGRADE PYELOGRAM AND INSERTION STENT URETERAL;  Surgeon: Anthony Mas MD;  Location: [facility];  Service: Urology    WISDOM TOOTH EXTRACTION Bilateral    [3]   Family History  Problem Relation Name Age of Onset    Hypertension Mother Kiana     Thyroid disease Mother Kiana     Uterine cancer Mother Kiana     Oophorectomy Mother Kiana     Hypertension Father Les     Diabetes Father Les     No Known Problems Daughter      Diabetes Maternal Grandmother Allison     Alzheimer's disease Maternal Grandmother Allison     Alzheimer's disease Maternal Grandfather      Heart disease Paternal  Grandfather      Heart attack Paternal Grandfather      No Known Problems Paternal Aunt

## 2025-06-09 NOTE — ASSESSMENT & PLAN NOTE
- Discussed options of Healthy[facility]-Intensive Lifestyle Intervention Program, Very Low Calorie Diet-VLCD, and Conservative Program and the role of weight loss medications.  - Patient is interested in pursuing Conservative Program and follow up visits with medical weight management provider.  - Explained the importance of making lifestyle changes in addition to starting any anti-obesity medications.   - Initial weight loss goal of 5-10% weight loss for improved health. Weight loss can improve patient's co-morbid conditions and/or prevent weight-related complications.  - Weight is not at goal and patient has been unable to achieve a meaningful weight loss above 5% using various programs and tools for more than 6 months  - Labs reviewed from 5/2025    General Recommendations:  Nutrition:  Eat breakfast daily.  Do not skip meals.      Food log (ie.) www.myfitnesspal.com, sparkpeople.com, loseit.com, calorieking.com, etc.     Practice mindful eating.  Be sure to set aside time to eat, eat slowly, and savor your food.     Hydration:    At least 64oz of water daily.  No sugar sweetened beverages.  No juice (eat the fruit instead).     Exercise:  Studies have shown that the ideal exercise goal is somewhere between 150 to 300 minutes of moderate intensity exercise a week.  Start with exercising 10 minutes every other day and gradually increase physical activity with a goal of at least 150 minutes of moderate intensity exercise a week, divided over at least 3 days a week.  An example of this would be exercising 30 minutes a day, 5 days a week.  Resistance training can increase muscle mass and increase our resting metabolic rate.   FULL BODY resistance training is recommended 2-3 times a week.  Do not do this on consecutive days to allow for muscle recovery.     Aim for a bare minimum 5000 steps, even on days you do not exercise.     Monitoring:   Weigh yourself daily.  If this causes undue stress, then just weigh yourself once  a week.  Weigh yourself the same time of the day with the same amount of clothing on.  Preferably this should be done after waking up, before you eat, and with no clothing or minimal clothing on.     Specific Goals:  Calorie goal:  5794-1858 saumya/day (Provided with meal plan to follow)   Discussed her nutrition and lifestyle and she is encouraged to track her food.  Increase her protein and fiber intake. Discussed protein rich snacks and shakes she can try.   Discussed the importance of increasing her physical activity and strength training as she is able.   Discussed medications and would avoid phentermine due to her HTN. She is not interested in medication at this time and wants to participate in the conservative program and see the dietician.

## 2025-06-18 ENCOUNTER — FOLLOW UP (OUTPATIENT)
Dept: URBAN - METROPOLITAN AREA CLINIC 6 | Facility: CLINIC | Age: 45
End: 2025-06-18

## 2025-06-18 DIAGNOSIS — H00.14: ICD-10-CM

## 2025-06-18 DIAGNOSIS — H52.11: ICD-10-CM

## 2025-06-18 PROCEDURE — 92012 INTRM OPH EXAM EST PATIENT: CPT

## 2025-06-18 PROCEDURE — 92015 DETERMINE REFRACTIVE STATE: CPT

## 2025-06-18 ASSESSMENT — VISUAL ACUITY
OS_SC: 20/30
OD_CC: 20/20

## 2025-06-18 ASSESSMENT — TONOMETRY
OD_IOP_MMHG: 17
OS_IOP_MMHG: 20

## 2025-07-14 DIAGNOSIS — G25.81 RLS (RESTLESS LEGS SYNDROME): ICD-10-CM

## 2025-07-14 RX ORDER — ROPINIROLE 2 MG/1
4 TABLET, FILM COATED ORAL
Qty: 60 TABLET | Refills: 2 | Status: SHIPPED | OUTPATIENT
Start: 2025-07-14

## 2025-07-24 ENCOUNTER — FOLLOW UP (OUTPATIENT)
Dept: URBAN - METROPOLITAN AREA CLINIC 6 | Facility: CLINIC | Age: 45
End: 2025-07-24

## 2025-07-24 DIAGNOSIS — H00.14: ICD-10-CM

## 2025-07-24 PROCEDURE — 67800 REMOVE EYELID LESION: CPT

## 2025-07-24 ASSESSMENT — VISUAL ACUITY
OS_SC: 20/20
OD_CC: 20/20-1

## 2025-07-24 ASSESSMENT — TONOMETRY
OS_IOP_MMHG: 19
OD_IOP_MMHG: 17

## 2025-08-06 ENCOUNTER — HOSPITAL ENCOUNTER (OUTPATIENT)
Dept: MAMMOGRAPHY | Facility: CLINIC | Age: 45
Discharge: HOME | End: 2025-08-06
Payer: COMMERCIAL

## (undated) DEVICE — INVIEW CLEAR LEGGINGS: Brand: CONVERTORS

## (undated) DEVICE — SPECIMEN CONTAINER STERILE PEEL PACK

## (undated) DEVICE — PACK TUR

## (undated) DEVICE — BASKET SPECIMEN RETRIVAL 1.9FR 120CM

## (undated) DEVICE — PREMIUM DRY TRAY LF: Brand: MEDLINE INDUSTRIES, INC.

## (undated) DEVICE — LASER HOLMIUM FIBER 365 MIC

## (undated) DEVICE — CHLORHEXIDINE 4PCT 4 OZ

## (undated) DEVICE — GLOVE SRG BIOGEL 7

## (undated) DEVICE — CATH URETERAL 5FR X 70 CM FLEX TIP POLYUR BARD

## (undated) DEVICE — SYRINGE 10ML LL

## (undated) DEVICE — BASIC SINGLE BASIN 2-LF: Brand: MEDLINE INDUSTRIES, INC.

## (undated) DEVICE — GUIDEWIRE STRGHT TIP 0.035 IN  SOLO PLUS

## (undated) RX ORDER — TOBRAMYCIN AND DEXAMETHASONE 1; 3 MG/ML; MG/ML
1 SUSPENSION/ DROPS OPHTHALMIC
Start: 2025-07-24

## (undated) RX ORDER — PREDNISOLONE ACETATE 10 MG/ML: 1 SUSPENSION/ DROPS OPHTHALMIC